# Patient Record
Sex: MALE | Race: WHITE | NOT HISPANIC OR LATINO | Employment: OTHER | URBAN - METROPOLITAN AREA
[De-identification: names, ages, dates, MRNs, and addresses within clinical notes are randomized per-mention and may not be internally consistent; named-entity substitution may affect disease eponyms.]

---

## 2019-12-18 ENCOUNTER — HOSPITAL ENCOUNTER (EMERGENCY)
Facility: MEDICAL CENTER | Age: 84
End: 2019-12-18
Attending: EMERGENCY MEDICINE
Payer: MEDICARE

## 2019-12-18 ENCOUNTER — APPOINTMENT (OUTPATIENT)
Dept: RADIOLOGY | Facility: MEDICAL CENTER | Age: 84
End: 2019-12-18
Attending: EMERGENCY MEDICINE
Payer: MEDICARE

## 2019-12-18 VITALS
RESPIRATION RATE: 16 BRPM | HEART RATE: 75 BPM | OXYGEN SATURATION: 97 % | WEIGHT: 149.91 LBS | BODY MASS INDEX: 23.53 KG/M2 | TEMPERATURE: 97.5 F | DIASTOLIC BLOOD PRESSURE: 69 MMHG | SYSTOLIC BLOOD PRESSURE: 117 MMHG | HEIGHT: 67 IN

## 2019-12-18 DIAGNOSIS — S61.412A LACERATION OF LEFT HAND WITHOUT FOREIGN BODY, INITIAL ENCOUNTER: ICD-10-CM

## 2019-12-18 PROCEDURE — 700101 HCHG RX REV CODE 250

## 2019-12-18 PROCEDURE — 304217 HCHG IRRIGATION SYSTEM

## 2019-12-18 PROCEDURE — 73130 X-RAY EXAM OF HAND: CPT | Mod: LT

## 2019-12-18 PROCEDURE — 303747 HCHG EXTRA SUTURE

## 2019-12-18 PROCEDURE — 304999 HCHG REPAIR-SIMPLE/INTERMED LEVEL 1

## 2019-12-18 PROCEDURE — 99283 EMERGENCY DEPT VISIT LOW MDM: CPT

## 2019-12-18 RX ORDER — LIDOCAINE HYDROCHLORIDE AND EPINEPHRINE BITARTRATE 20; .01 MG/ML; MG/ML
INJECTION, SOLUTION SUBCUTANEOUS
Status: COMPLETED
Start: 2019-12-18 | End: 2019-12-18

## 2019-12-18 RX ORDER — LIDOCAINE HYDROCHLORIDE AND EPINEPHRINE BITARTRATE 20; .01 MG/ML; MG/ML
10 INJECTION, SOLUTION SUBCUTANEOUS ONCE
Status: COMPLETED | OUTPATIENT
Start: 2019-12-18 | End: 2019-12-18

## 2019-12-18 RX ADMIN — LIDOCAINE HYDROCHLORIDE AND EPINEPHRINE BITARTRATE: 20; .01 INJECTION, SOLUTION SUBCUTANEOUS at 07:45

## 2019-12-18 RX ADMIN — LIDOCAINE HYDROCHLORIDE AND EPINEPHRINE: 20; 10 INJECTION, SOLUTION INFILTRATION; PERINEURAL at 07:45

## 2019-12-18 NOTE — ED TRIAGE NOTES
BIB EMS with   Chief Complaint   Patient presents with   • Hand Laceration   Fell out of bed and tore left hand on night stand. Bleeding controlled, dressing in place.  VSS. Denies LOC, head, neck, or back pain. A&O x 4.

## 2019-12-18 NOTE — ED PROVIDER NOTES
ED Provider Note    CHIEF COMPLAINT  Chief Complaint   Patient presents with   • Hand Laceration       HPI  Stephen Ortiz is a 84 y.o. male who presents with a left hand laceration after a fall.  The patient states that he fell out of bed this morning and cut the dorsal aspect of his left hand on some type of object.  He presents with a large laceration to the dorsal aspect of the hand with some arterial bleeding.  The patient states he does not have any loss of function to the hand but does have significant discomfort dorsally.  He is unaware of any other injuries.  He states he does not take anticoagulants.    REVIEW OF SYSTEMS  See HPI for further details. All other systems are negative.     PAST MEDICAL HISTORY  Past Medical History:   Diagnosis Date   • Cancer (HCC) 2004    skin and prostate   • CATARACT    • Hypertension    • Pain     back   • Psychiatric problem     depression       FAMILY HISTORY  [unfilled]    SOCIAL HISTORY  Social History     Socioeconomic History   • Marital status:      Spouse name: Not on file   • Number of children: Not on file   • Years of education: Not on file   • Highest education level: Not on file   Occupational History   • Not on file   Social Needs   • Financial resource strain: Not on file   • Food insecurity:     Worry: Not on file     Inability: Not on file   • Transportation needs:     Medical: Not on file     Non-medical: Not on file   Tobacco Use   • Smoking status: Former Smoker     Packs/day: 0.00   • Smokeless tobacco: Never Used   • Tobacco comment: 1ppd for 20+ yrs   Substance and Sexual Activity   • Alcohol use: Yes     Comment: 5 beers a day   • Drug use: No   • Sexual activity: Not on file   Lifestyle   • Physical activity:     Days per week: Not on file     Minutes per session: Not on file   • Stress: Not on file   Relationships   • Social connections:     Talks on phone: Not on file     Gets together: Not on file     Attends Congregation service: Not  "on file     Active member of club or organization: Not on file     Attends meetings of clubs or organizations: Not on file     Relationship status: Not on file   • Intimate partner violence:     Fear of current or ex partner: Not on file     Emotionally abused: Not on file     Physically abused: Not on file     Forced sexual activity: Not on file   Other Topics Concern   • Not on file   Social History Narrative   • Not on file       SURGICAL HISTORY  Past Surgical History:   Procedure Laterality Date   • RECOVERY  7/12/2010    Performed by SURGERY, CATH-RECOVERY at [facility]   • OTHER  2002    hernia   • OTHER ORTHOPEDIC SURGERY  1990?    right arm bicep       CURRENT MEDICATIONS  Home Medications     Reviewed by Bette Garcia R.N. (Registered Nurse) on 12/18/19 at 0727  Med List Status: Partial   Medication Last Dose Status   furosemide (LASIX) 20 MG TABS  Active   Hydrocodone-Acetaminophen (VICODIN PO)  Active   lisinopril (PRINIVIL) 20 MG TABS  Active   metoprolol (LOPRESSOR) 50 MG TABS  Active                ALLERGIES  No Known Allergies    PHYSICAL EXAM  VITAL SIGNS: /82   Pulse 80   Temp 36.4 °C (97.5 °F) (Temporal)   Resp 16   Ht 1.702 m (5' 7\")   Wt 68 kg (149 lb 14.6 oz)   SpO2 92%   BMI 23.48 kg/m²  Room air O2: 92    Constitutional: Chronically ill in appearance  HENT: Normocephalic, Atraumatic, Bilateral external ears normal, Oropharynx moist, No oral exudates, Nose normal.   Eyes: PERRLA, EOMI, Conjunctiva normal, No discharge.   Neck: Normal range of motion, No tenderness, Supple, No stridor.   Lymphatic: No lymphadenopathy noted.   Cardiovascular: Normal heart rate, Normal rhythm, No murmurs, No rubs, No gallops.   Thorax & Lungs: Symmetrically diminished throughout, No respiratory distress, No wheezing, No chest tenderness.   Abdomen: Bowel sounds normal, Soft, No tenderness, No masses, No pulsatile masses.   Skin: 4 cm stellate laceration to the dorsal aspect " of the left hand with a superficial arterial bleed.   Back: No tenderness, No CVA tenderness.   Extremities: The laceration described above otherwise intact distal pulses, No edema, No tenderness, No cyanosis, No clubbing.   Neurologic: Alert & oriented x 3, Normal motor function, Normal sensory function, No focal deficits noted.   Psychiatric: Affect normal, Judgment normal, Mood normal.     RADIOLOGY  DX-HAND 3+ LEFT   Final Result      1.  No acute fracture or dislocation. No radiopaque foreign body.   2.  Soft tissue calcification along the dorsomedial side of the fifth middle phalanx, which suggests or soft tissue mass lesion. Consider outpatient contrast-enhanced MRI for further evaluation.        PROCEDURES laceration repair  Lidocaine with epinephrine was utilized for local anesthetic.  I irrigated the wound myself.  The patient did have 3 spanning sutures placed to help control bleeding and approximate the wound.  The full length of the laceration cannot be completely repaired due to the fragile skin.    COURSE & MEDICAL DECISION MAKING  Pertinent Labs & Imaging studies reviewed. (See chart for details)  This an 84-year-old gentleman who presents with a laceration to the dorsal aspect of the left hand.  The patient did have some superficial arterial bleeding on arrival I cannot localize the vessel and therefore I placed 3 deep spanning sutures and did have decent hemostasis.  A pressure bandage was applied.  The patient is currently establishing care at the [facility].  I discussed the abnormality on the x-ray that will require further outpatient work-up.  [facility]  The patient will return to the emergency department for increased bleeding or signs of infection.  Otherwise I do not appreciate any other evidence of injury.    FINAL IMPRESSION  1.  4 cm stellate complex laceration to the left hand    Disposition  The patient will be  discharged in stable condition         Electronically signed by: Shawn Haney, 12/18/2019 7:48 AM

## 2019-12-20 ENCOUNTER — HOSPITAL ENCOUNTER (EMERGENCY)
Facility: MEDICAL CENTER | Age: 84
End: 2019-12-20
Attending: EMERGENCY MEDICINE
Payer: MEDICARE

## 2019-12-20 VITALS
RESPIRATION RATE: 12 BRPM | DIASTOLIC BLOOD PRESSURE: 82 MMHG | TEMPERATURE: 97 F | HEART RATE: 108 BPM | HEIGHT: 67 IN | BODY MASS INDEX: 23.53 KG/M2 | WEIGHT: 149.91 LBS | SYSTOLIC BLOOD PRESSURE: 133 MMHG | OXYGEN SATURATION: 95 %

## 2019-12-20 DIAGNOSIS — S61.412A SKIN TEAR OF LEFT HAND WITHOUT COMPLICATION, INITIAL ENCOUNTER: ICD-10-CM

## 2019-12-20 PROCEDURE — 302874 HCHG BANDAGE ACE 2 OR 3""

## 2019-12-20 PROCEDURE — 99283 EMERGENCY DEPT VISIT LOW MDM: CPT

## 2019-12-20 PROCEDURE — 303485 HCHG DRESSING MEDIUM

## 2019-12-20 PROCEDURE — 304217 HCHG IRRIGATION SYSTEM

## 2019-12-20 SDOH — HEALTH STABILITY: MENTAL HEALTH: HOW MANY STANDARD DRINKS CONTAINING ALCOHOL DO YOU HAVE ON A TYPICAL DAY?: 1 OR 2

## 2019-12-20 SDOH — HEALTH STABILITY: MENTAL HEALTH: HOW OFTEN DO YOU HAVE A DRINK CONTAINING ALCOHOL?: 2-3 TIMES A WEEK

## 2019-12-20 NOTE — ED PROVIDER NOTES
ED Provider Note    Scribed for Ryan Lopes M.D. by Leonor Nam. 12/20/2019  9:22 AM    Primary care provider: Pcp Not In Computer  Means of arrival: Walk-In  History obtained from: Patient  History limited by: None    CHIEF COMPLAINT  Chief Complaint   Patient presents with   • Laceration     hand lac repaired 2 days ago, bleeding again this am. squirting slightly when redressed.      HPI  Stephen Ortiz is a 84 y.o. male who presents to the Emergency Department for evaluation of a laceration to the dorsal aspect of his left hand, sustained after fall 2 days ago. Patient presented to the ED after his fall and had his laceration repaired however after rolling over on his left hand last night the wound reopened. There is active bleeding and patient reports placing Quikclot on it to try and control the bleeding.     REVIEW OF SYSTEMS  Pertinent positives include laceration, active bleeding.   Pertinent negatives include no nausea or vomiting.      PAST MEDICAL HISTORY   has a past medical history of Cancer (HCC) (2004), CATARACT, Hypertension, Pain, and Psychiatric problem.    SURGICAL HISTORY   has a past surgical history that includes other orthopedic surgery (1990?); other (2002); and recovery (7/12/2010).    SOCIAL HISTORY  Social History     Tobacco Use   • Smoking status: Former Smoker     Packs/day: 0.00   • Smokeless tobacco: Never Used   • Tobacco comment: 1ppd for 20+ yrs   Substance Use Topics   • Alcohol use: Yes     Frequency: 2-3 times a week     Drinks per session: 1 or 2     Comment: 5 beers a day   • Drug use: No      Social History     Substance and Sexual Activity   Drug Use No       FAMILY HISTORY  History reviewed. No pertinent family history.    CURRENT MEDICATIONS  Home Medications     Reviewed by Jerry Keith R.N. (Registered Nurse) on 12/20/19 at 0905  Med List Status: Not Addressed   Medication Last Dose Status   furosemide (LASIX) 20 MG TABS  Active  "  Hydrocodone-Acetaminophen (VICODIN PO)  Active   lisinopril (PRINIVIL) 20 MG TABS  Active   metoprolol (LOPRESSOR) 50 MG TABS  Active                ALLERGIES  No Known Allergies    PHYSICAL EXAM  VITAL SIGNS: /82   Pulse (!) 108   Temp 36.1 °C (97 °F) (Temporal)   Resp 12   Ht 1.702 m (5' 7\")   Wt 68 kg (149 lb 14.6 oz)   SpO2 95%   BMI 23.48 kg/m²     Nursing note and vitals reviewed.  Constitutional: Well-developed and well-nourished. No distress.   HENT: Head is normocephalic and atraumatic. Oropharynx is clear and moist without exudate or erythema.   Eyes: Pupils are equal, round, and reactive to light. Conjunctiva are normal.   Cardiovascular: Normal rate and regular rhythm. No murmur heard. Normal radial pulses.  Pulmonary/Chest: Breath sounds normal. No wheezes or rales.   Abdominal: Soft and non-tender. No distention    Musculoskeletal: Extremities exhibit normal range of motion without edema.  Neurological: Awake, alert and oriented to person, place, and time. No focal deficits noted.  Skin: Sutured wound to the dorsum of left hand. There is Quikclot covering the wound. Small hematoma. There is some venous oozing from the wound. Skin is warm and dry. No rash.   Psychiatric: Normal mood and affect. Appropriate for clinical situation.      COURSE & MEDICAL DECISION MAKING  Nursing notes, VS, PMSFHx reviewed in chart.     Review of past medical records shows the patient was seen here 2 days ago for laceration repair.      9:22 AM - Patient seen and examined at bedside. I do not feel that additional sutures would be beneficial as this will increase risk of infection. A pressure dressing will be placed wit an ace bandage on top. Discussed this plan of care with the patient and he verbalized his understanding and is agreeable. He is advised to keep the dressing on for 24 hours and he will be discharged home after interventions.      The patient will return for new or worsening symptoms and is " stable at the time of discharge.    The patient is referred to a primary physician for blood pressure management, diabetic screening, and for all other preventative health concerns.      DISPOSITION:  Patient will be discharged home in stable condition.    FOLLOW UP:  [facility], Emergency Dept  [address] [id number] [phone]    If symptoms worsen      OUTPATIENT MEDICATIONS:  New Prescriptions    No medications on file         FINAL IMPRESSION  1. Skin tear of left hand without complication, initial encounter          Leonor DAVIS (Ksenia), am scribing for, and in the presence of, Ryan Lopes M.D..    Electronically signed by: Leonor Nam (Ksenia), 12/20/2019    IRyan M.D. personally performed the services described in this documentation, as scribed by Leonor Nam in my presence, and it is both accurate and complete. E    The note accurately reflects work and decisions made by me.  Ryan Lopes  12/20/2019  2:11 PM

## 2019-12-20 NOTE — ED NOTES
Wound cleansed with NS. Gauze, then pressure dressing, then ace bandage applied. Wound care teaching w/ return verbalization from patient. Denies questions/concerns. Grandson present for all teaching.

## 2019-12-20 NOTE — ED TRIAGE NOTES
"Chief Complaint   Patient presents with   • Laceration     hand lac repaired 2 days ago, bleeding again this am. squirting slightly when redressed.      /82   Pulse (!) 108   Temp 36.1 °C (97 °F) (Temporal)   Resp 12   Ht 1.702 m (5' 7\")   Wt 68 kg (149 lb 14.6 oz)   SpO2 95%   BMI 23.48 kg/m²     84 yr old male with hand laceration repaired 2 days ago. Started bleeding this am.   "

## 2019-12-20 NOTE — ED NOTES
Pt discharged at this time. Given all prescriptions and instructions. Verbalize understanding of all dc instructions. Released to self via ambulatory.

## 2020-12-27 ENCOUNTER — APPOINTMENT (OUTPATIENT)
Dept: RADIOLOGY | Facility: MEDICAL CENTER | Age: 85
DRG: 189 | End: 2020-12-27
Attending: EMERGENCY MEDICINE
Payer: COMMERCIAL

## 2020-12-27 ENCOUNTER — APPOINTMENT (OUTPATIENT)
Dept: CARDIOLOGY | Facility: MEDICAL CENTER | Age: 85
DRG: 189 | End: 2020-12-27
Attending: STUDENT IN AN ORGANIZED HEALTH CARE EDUCATION/TRAINING PROGRAM
Payer: COMMERCIAL

## 2020-12-27 ENCOUNTER — APPOINTMENT (OUTPATIENT)
Dept: RADIOLOGY | Facility: MEDICAL CENTER | Age: 85
DRG: 189 | End: 2020-12-27
Attending: STUDENT IN AN ORGANIZED HEALTH CARE EDUCATION/TRAINING PROGRAM
Payer: COMMERCIAL

## 2020-12-27 ENCOUNTER — HOSPITAL ENCOUNTER (INPATIENT)
Facility: MEDICAL CENTER | Age: 85
LOS: 17 days | DRG: 189 | End: 2021-01-13
Attending: EMERGENCY MEDICINE | Admitting: STUDENT IN AN ORGANIZED HEALTH CARE EDUCATION/TRAINING PROGRAM
Payer: COMMERCIAL

## 2020-12-27 DIAGNOSIS — C93.10 CHRONIC MYELOMONOCYTIC LEUKEMIA NOT HAVING ACHIEVED REMISSION (HCC): ICD-10-CM

## 2020-12-27 DIAGNOSIS — I48.20 CHRONIC ATRIAL FIBRILLATION (HCC): ICD-10-CM

## 2020-12-27 DIAGNOSIS — J96.01 ACUTE RESPIRATORY FAILURE WITH HYPOXIA (HCC): ICD-10-CM

## 2020-12-27 DIAGNOSIS — I34.0 NONRHEUMATIC MITRAL VALVE REGURGITATION: ICD-10-CM

## 2020-12-27 DIAGNOSIS — L97.519 ULCER OF GREAT TOE, RIGHT, WITH UNSPECIFIED SEVERITY (HCC): ICD-10-CM

## 2020-12-27 DIAGNOSIS — I36.1 NONRHEUMATIC TRICUSPID VALVE REGURGITATION: ICD-10-CM

## 2020-12-27 DIAGNOSIS — I50.23 ACUTE ON CHRONIC SYSTOLIC CONGESTIVE HEART FAILURE (HCC): ICD-10-CM

## 2020-12-27 PROBLEM — N17.9 AKI (ACUTE KIDNEY INJURY) (HCC): Status: ACTIVE | Noted: 2020-12-27

## 2020-12-27 PROBLEM — I50.9 ACUTE ON CHRONIC CONGESTIVE HEART FAILURE (HCC): Status: ACTIVE | Noted: 2020-12-27

## 2020-12-27 PROBLEM — J18.9 CAP (COMMUNITY ACQUIRED PNEUMONIA): Status: ACTIVE | Noted: 2020-12-27

## 2020-12-27 PROBLEM — R74.8 ELEVATED LIVER ENZYMES: Status: ACTIVE | Noted: 2020-12-27

## 2020-12-27 PROBLEM — I07.1 TRICUSPID REGURGITATION: Status: ACTIVE | Noted: 2020-12-27

## 2020-12-27 LAB
ALBUMIN SERPL BCP-MCNC: 4.5 G/DL (ref 3.2–4.9)
ALBUMIN/GLOB SERPL: 1.5 G/DL
ALP SERPL-CCNC: 115 U/L (ref 30–99)
ALT SERPL-CCNC: 218 U/L (ref 2–50)
ANION GAP SERPL CALC-SCNC: 20 MMOL/L (ref 7–16)
ANISOCYTOSIS BLD QL SMEAR: ABNORMAL
APPEARANCE UR: ABNORMAL
AST SERPL-CCNC: 281 U/L (ref 12–45)
BACTERIA #/AREA URNS HPF: NEGATIVE /HPF
BASOPHILS # BLD AUTO: 0 % (ref 0–1.8)
BASOPHILS # BLD: 0 K/UL (ref 0–0.12)
BILIRUB SERPL-MCNC: 3.6 MG/DL (ref 0.1–1.5)
BILIRUB UR QL STRIP.AUTO: ABNORMAL
BUN SERPL-MCNC: 49 MG/DL (ref 8–22)
BURR CELLS BLD QL SMEAR: NORMAL
CALCIUM SERPL-MCNC: 9.6 MG/DL (ref 8.5–10.5)
CHLORIDE SERPL-SCNC: 102 MMOL/L (ref 96–112)
CO2 SERPL-SCNC: 14 MMOL/L (ref 20–33)
COLOR UR: ABNORMAL
COVID ORDER STATUS COVID19: NORMAL
CREAT SERPL-MCNC: 2.85 MG/DL (ref 0.5–1.4)
CRP SERPL HS-MCNC: 2.28 MG/DL (ref 0–0.75)
D DIMER PPP IA.FEU-MCNC: 3.74 UG/ML (FEU) (ref 0–0.5)
EKG IMPRESSION: NORMAL
EOSINOPHIL # BLD AUTO: 0 K/UL (ref 0–0.51)
EOSINOPHIL NFR BLD: 0 % (ref 0–6.9)
EPI CELLS #/AREA URNS HPF: ABNORMAL /HPF
ERYTHROCYTE [DISTWIDTH] IN BLOOD BY AUTOMATED COUNT: 76.9 FL (ref 35.9–50)
FLUAV RNA SPEC QL NAA+PROBE: NEGATIVE
FLUBV RNA SPEC QL NAA+PROBE: NEGATIVE
GLOBULIN SER CALC-MCNC: 3 G/DL (ref 1.9–3.5)
GLUCOSE SERPL-MCNC: 89 MG/DL (ref 65–99)
GLUCOSE UR STRIP.AUTO-MCNC: NEGATIVE MG/DL
HCT VFR BLD AUTO: 36.9 % (ref 42–52)
HGB BLD-MCNC: 11.6 G/DL (ref 14–18)
HYALINE CASTS #/AREA URNS LPF: ABNORMAL /LPF
KETONES UR STRIP.AUTO-MCNC: ABNORMAL MG/DL
LACTATE BLD-SCNC: 4.3 MMOL/L (ref 0.5–2)
LEUKOCYTE ESTERASE UR QL STRIP.AUTO: NEGATIVE
LYMPHOCYTES # BLD AUTO: 0.71 K/UL (ref 1–4.8)
LYMPHOCYTES NFR BLD: 1.7 % (ref 22–41)
MACROCYTES BLD QL SMEAR: ABNORMAL
MAGNESIUM SERPL-MCNC: 2.3 MG/DL (ref 1.5–2.5)
MANUAL DIFF BLD: NORMAL
MCH RBC QN AUTO: 32 PG (ref 27–33)
MCHC RBC AUTO-ENTMCNC: 31.4 G/DL (ref 33.7–35.3)
MCV RBC AUTO: 101.7 FL (ref 81.4–97.8)
MICRO URNS: ABNORMAL
MONOCYTES # BLD AUTO: 14.49 K/UL (ref 0–0.85)
MONOCYTES NFR BLD AUTO: 34.5 % (ref 0–13.4)
MORPHOLOGY BLD-IMP: NORMAL
NEUTROPHILS # BLD AUTO: 26.8 K/UL (ref 1.82–7.42)
NEUTROPHILS NFR BLD: 62.9 % (ref 44–72)
NEUTS BAND NFR BLD MANUAL: 0.9 % (ref 0–10)
NITRITE UR QL STRIP.AUTO: NEGATIVE
NRBC # BLD AUTO: 0.29 K/UL
NRBC BLD-RTO: 0.7 /100 WBC
NT-PROBNP SERPL IA-MCNC: ABNORMAL PG/ML (ref 0–125)
OVALOCYTES BLD QL SMEAR: NORMAL
PH UR STRIP.AUTO: 5 [PH] (ref 5–8)
PHOSPHATE SERPL-MCNC: 3.6 MG/DL (ref 2.5–4.5)
PLATELET # BLD AUTO: 75 K/UL (ref 164–446)
PLATELET BLD QL SMEAR: NORMAL
PMV BLD AUTO: 11.6 FL (ref 9–12.9)
POIKILOCYTOSIS BLD QL SMEAR: NORMAL
POLYCHROMASIA BLD QL SMEAR: NORMAL
POTASSIUM SERPL-SCNC: 5.4 MMOL/L (ref 3.6–5.5)
PROCALCITONIN SERPL-MCNC: 0.58 NG/ML
PROT SERPL-MCNC: 7.5 G/DL (ref 6–8.2)
PROT UR QL STRIP: 100 MG/DL
RBC # BLD AUTO: 3.63 M/UL (ref 4.7–6.1)
RBC # URNS HPF: ABNORMAL /HPF
RBC BLD AUTO: PRESENT
RBC UR QL AUTO: NEGATIVE
RSV RNA SPEC QL NAA+PROBE: NEGATIVE
SARS-COV-2 RNA RESP QL NAA+PROBE: NOTDETECTED
SODIUM SERPL-SCNC: 136 MMOL/L (ref 135–145)
SP GR UR STRIP.AUTO: 1.02
SPECIMEN SOURCE: NORMAL
TROPONIN T SERPL-MCNC: 35 NG/L (ref 6–19)
UROBILINOGEN UR STRIP.AUTO-MCNC: 1 MG/DL
WBC # BLD AUTO: 42 K/UL (ref 4.8–10.8)
WBC #/AREA URNS HPF: ABNORMAL /HPF

## 2020-12-27 PROCEDURE — 84484 ASSAY OF TROPONIN QUANT: CPT

## 2020-12-27 PROCEDURE — 83520 IMMUNOASSAY QUANT NOS NONAB: CPT

## 2020-12-27 PROCEDURE — A9540 TC99M MAA: HCPCS

## 2020-12-27 PROCEDURE — 85027 COMPLETE CBC AUTOMATED: CPT

## 2020-12-27 PROCEDURE — 86140 C-REACTIVE PROTEIN: CPT

## 2020-12-27 PROCEDURE — 94640 AIRWAY INHALATION TREATMENT: CPT

## 2020-12-27 PROCEDURE — 87186 SC STD MICRODIL/AGAR DIL: CPT

## 2020-12-27 PROCEDURE — 99285 EMERGENCY DEPT VISIT HI MDM: CPT

## 2020-12-27 PROCEDURE — 80053 COMPREHEN METABOLIC PANEL: CPT

## 2020-12-27 PROCEDURE — 87077 CULTURE AEROBIC IDENTIFY: CPT | Mod: 91

## 2020-12-27 PROCEDURE — A9270 NON-COVERED ITEM OR SERVICE: HCPCS | Performed by: STUDENT IN AN ORGANIZED HEALTH CARE EDUCATION/TRAINING PROGRAM

## 2020-12-27 PROCEDURE — 93005 ELECTROCARDIOGRAM TRACING: CPT

## 2020-12-27 PROCEDURE — 99221 1ST HOSP IP/OBS SF/LOW 40: CPT | Performed by: STUDENT IN AN ORGANIZED HEALTH CARE EDUCATION/TRAINING PROGRAM

## 2020-12-27 PROCEDURE — 87086 URINE CULTURE/COLONY COUNT: CPT

## 2020-12-27 PROCEDURE — 85007 BL SMEAR W/DIFF WBC COUNT: CPT

## 2020-12-27 PROCEDURE — 700111 HCHG RX REV CODE 636 W/ 250 OVERRIDE (IP): Performed by: STUDENT IN AN ORGANIZED HEALTH CARE EDUCATION/TRAINING PROGRAM

## 2020-12-27 PROCEDURE — 770020 HCHG ROOM/CARE - TELE (206)

## 2020-12-27 PROCEDURE — 700111 HCHG RX REV CODE 636 W/ 250 OVERRIDE (IP): Performed by: EMERGENCY MEDICINE

## 2020-12-27 PROCEDURE — 83605 ASSAY OF LACTIC ACID: CPT

## 2020-12-27 PROCEDURE — 93970 EXTREMITY STUDY: CPT

## 2020-12-27 PROCEDURE — 76705 ECHO EXAM OF ABDOMEN: CPT

## 2020-12-27 PROCEDURE — 93005 ELECTROCARDIOGRAM TRACING: CPT | Performed by: EMERGENCY MEDICINE

## 2020-12-27 PROCEDURE — 36415 COLL VENOUS BLD VENIPUNCTURE: CPT

## 2020-12-27 PROCEDURE — 87040 BLOOD CULTURE FOR BACTERIA: CPT

## 2020-12-27 PROCEDURE — 80500 HCHG CLINICAL PATH CONSULT-LIMITED: CPT

## 2020-12-27 PROCEDURE — 84100 ASSAY OF PHOSPHORUS: CPT

## 2020-12-27 PROCEDURE — 71045 X-RAY EXAM CHEST 1 VIEW: CPT

## 2020-12-27 PROCEDURE — 83880 ASSAY OF NATRIURETIC PEPTIDE: CPT

## 2020-12-27 PROCEDURE — 700105 HCHG RX REV CODE 258: Performed by: EMERGENCY MEDICINE

## 2020-12-27 PROCEDURE — 0241U HCHG SARS-COV-2 COVID-19 NFCT DS RESP RNA 4 TRGT MIC: CPT

## 2020-12-27 PROCEDURE — 84145 PROCALCITONIN (PCT): CPT

## 2020-12-27 PROCEDURE — 96375 TX/PRO/DX INJ NEW DRUG ADDON: CPT

## 2020-12-27 PROCEDURE — 93306 TTE W/DOPPLER COMPLETE: CPT

## 2020-12-27 PROCEDURE — 83735 ASSAY OF MAGNESIUM: CPT

## 2020-12-27 PROCEDURE — 85379 FIBRIN DEGRADATION QUANT: CPT

## 2020-12-27 PROCEDURE — C9803 HOPD COVID-19 SPEC COLLECT: HCPCS | Performed by: EMERGENCY MEDICINE

## 2020-12-27 PROCEDURE — 700102 HCHG RX REV CODE 250 W/ 637 OVERRIDE(OP): Performed by: STUDENT IN AN ORGANIZED HEALTH CARE EDUCATION/TRAINING PROGRAM

## 2020-12-27 PROCEDURE — 81001 URINALYSIS AUTO W/SCOPE: CPT

## 2020-12-27 PROCEDURE — 96365 THER/PROPH/DIAG IV INF INIT: CPT

## 2020-12-27 RX ORDER — ALLOPURINOL 300 MG/1
150 TABLET ORAL EVERY MORNING
COMMUNITY

## 2020-12-27 RX ORDER — HYDRALAZINE HYDROCHLORIDE 25 MG/1
25 TABLET, FILM COATED ORAL
Status: ON HOLD | COMMUNITY
Start: 2020-10-13 | End: 2021-01-12

## 2020-12-27 RX ORDER — ACETAMINOPHEN 325 MG/1
650 TABLET ORAL EVERY 6 HOURS PRN
Status: DISCONTINUED | OUTPATIENT
Start: 2020-12-27 | End: 2020-12-28

## 2020-12-27 RX ORDER — BISACODYL 10 MG
10 SUPPOSITORY, RECTAL RECTAL
Status: DISCONTINUED | OUTPATIENT
Start: 2020-12-27 | End: 2020-12-30

## 2020-12-27 RX ORDER — HEPARIN SODIUM 5000 [USP'U]/ML
5000 INJECTION, SOLUTION INTRAVENOUS; SUBCUTANEOUS EVERY 8 HOURS
Status: DISCONTINUED | OUTPATIENT
Start: 2020-12-27 | End: 2020-12-29

## 2020-12-27 RX ORDER — ALLOPURINOL 300 MG/1
150 TABLET ORAL EVERY MORNING
Status: DISCONTINUED | OUTPATIENT
Start: 2020-12-27 | End: 2021-01-13 | Stop reason: HOSPADM

## 2020-12-27 RX ORDER — POTASSIUM CHLORIDE 20 MEQ/1
20 TABLET, EXTENDED RELEASE ORAL DAILY
Status: ON HOLD | COMMUNITY
End: 2021-01-12

## 2020-12-27 RX ORDER — CARVEDILOL 3.12 MG/1
3.12 TABLET ORAL 2 TIMES DAILY
Status: DISCONTINUED | OUTPATIENT
Start: 2020-12-27 | End: 2021-01-13 | Stop reason: HOSPADM

## 2020-12-27 RX ORDER — TAMSULOSIN HYDROCHLORIDE 0.4 MG/1
0.4 CAPSULE ORAL EVERY MORNING
Status: DISCONTINUED | OUTPATIENT
Start: 2020-12-27 | End: 2021-01-13 | Stop reason: HOSPADM

## 2020-12-27 RX ORDER — LOSARTAN POTASSIUM 25 MG/1
25 TABLET ORAL EVERY EVENING
Status: ON HOLD | COMMUNITY
Start: 2020-11-30 | End: 2021-01-12

## 2020-12-27 RX ORDER — LOSARTAN POTASSIUM 25 MG/1
25 TABLET ORAL EVERY EVENING
Status: DISCONTINUED | OUTPATIENT
Start: 2020-12-27 | End: 2020-12-27

## 2020-12-27 RX ORDER — ONDANSETRON 4 MG/1
4 TABLET, ORALLY DISINTEGRATING ORAL EVERY 4 HOURS PRN
Status: DISCONTINUED | OUTPATIENT
Start: 2020-12-27 | End: 2021-01-13 | Stop reason: HOSPADM

## 2020-12-27 RX ORDER — ISOSORBIDE MONONITRATE 30 MG/1
30 TABLET, EXTENDED RELEASE ORAL
Status: ON HOLD | COMMUNITY
Start: 2020-11-30 | End: 2021-01-12

## 2020-12-27 RX ORDER — CARVEDILOL 3.12 MG/1
3.12 TABLET ORAL
COMMUNITY
Start: 2020-10-13

## 2020-12-27 RX ORDER — ONDANSETRON 2 MG/ML
4 INJECTION INTRAMUSCULAR; INTRAVENOUS EVERY 4 HOURS PRN
Status: DISCONTINUED | OUTPATIENT
Start: 2020-12-27 | End: 2021-01-13 | Stop reason: HOSPADM

## 2020-12-27 RX ORDER — AMOXICILLIN 250 MG
2 CAPSULE ORAL 2 TIMES DAILY
Status: DISCONTINUED | OUTPATIENT
Start: 2020-12-27 | End: 2020-12-30

## 2020-12-27 RX ORDER — OXYCODONE HYDROCHLORIDE 15 MG/1
15 TABLET ORAL
Status: ON HOLD | COMMUNITY
End: 2021-01-12

## 2020-12-27 RX ORDER — BISACODYL 10 MG
10 SUPPOSITORY, RECTAL RECTAL
Status: ON HOLD | COMMUNITY
End: 2021-01-12

## 2020-12-27 RX ORDER — DOCUSATE SODIUM 250 MG
250 CAPSULE ORAL 2 TIMES DAILY
Status: ON HOLD | COMMUNITY
End: 2021-01-12

## 2020-12-27 RX ORDER — ROSUVASTATIN CALCIUM 20 MG/1
10 TABLET, COATED ORAL
Status: DISCONTINUED | OUTPATIENT
Start: 2020-12-27 | End: 2020-12-27

## 2020-12-27 RX ORDER — LABETALOL HYDROCHLORIDE 5 MG/ML
10 INJECTION, SOLUTION INTRAVENOUS EVERY 4 HOURS PRN
Status: DISCONTINUED | OUTPATIENT
Start: 2020-12-27 | End: 2021-01-13 | Stop reason: HOSPADM

## 2020-12-27 RX ORDER — ROSUVASTATIN CALCIUM 10 MG/1
10 TABLET, COATED ORAL
COMMUNITY
Start: 2020-10-13

## 2020-12-27 RX ORDER — SODIUM CHLORIDE, SODIUM LACTATE, POTASSIUM CHLORIDE, AND CALCIUM CHLORIDE .6; .31; .03; .02 G/100ML; G/100ML; G/100ML; G/100ML
30 INJECTION, SOLUTION INTRAVENOUS
Status: COMPLETED | OUTPATIENT
Start: 2020-12-27 | End: 2020-12-27

## 2020-12-27 RX ORDER — HYDRALAZINE HYDROCHLORIDE 25 MG/1
25 TABLET, FILM COATED ORAL 2 TIMES DAILY
Status: DISCONTINUED | OUTPATIENT
Start: 2020-12-27 | End: 2020-12-30

## 2020-12-27 RX ORDER — LANOLIN ALCOHOL/MO/W.PET/CERES
325 CREAM (GRAM) TOPICAL DAILY
COMMUNITY
Start: 2020-12-01 | End: 2020-12-27

## 2020-12-27 RX ORDER — POLYETHYLENE GLYCOL 3350 17 G/17G
1 POWDER, FOR SOLUTION ORAL
Status: DISCONTINUED | OUTPATIENT
Start: 2020-12-27 | End: 2020-12-30

## 2020-12-27 RX ORDER — TAMSULOSIN HYDROCHLORIDE 0.4 MG/1
0.4 CAPSULE ORAL EVERY MORNING
COMMUNITY

## 2020-12-27 RX ORDER — ISOSORBIDE MONONITRATE 30 MG/1
30 TABLET, EXTENDED RELEASE ORAL 2 TIMES DAILY
Status: DISCONTINUED | OUTPATIENT
Start: 2020-12-27 | End: 2020-12-30

## 2020-12-27 RX ORDER — SODIUM CHLORIDE 9 MG/ML
INJECTION, SOLUTION INTRAVENOUS CONTINUOUS
Status: DISCONTINUED | OUTPATIENT
Start: 2020-12-27 | End: 2020-12-27

## 2020-12-27 RX ORDER — BUMETANIDE 2 MG/1
2 TABLET ORAL
COMMUNITY
Start: 2020-11-18 | End: 2020-12-27

## 2020-12-27 RX ORDER — SPIRONOLACTONE 25 MG/1
12.5 TABLET ORAL
COMMUNITY
Start: 2020-12-01 | End: 2020-12-27

## 2020-12-27 RX ORDER — AZITHROMYCIN 500 MG/5ML
500 INJECTION, POWDER, LYOPHILIZED, FOR SOLUTION INTRAVENOUS EVERY 24 HOURS
Status: DISCONTINUED | OUTPATIENT
Start: 2020-12-27 | End: 2020-12-27

## 2020-12-27 RX ORDER — DOXYCYCLINE 100 MG/1
100 TABLET ORAL EVERY 12 HOURS
Status: DISCONTINUED | OUTPATIENT
Start: 2020-12-27 | End: 2020-12-28

## 2020-12-27 RX ADMIN — ALLOPURINOL 150 MG: 300 TABLET ORAL at 16:08

## 2020-12-27 RX ADMIN — SODIUM CHLORIDE, POTASSIUM CHLORIDE, SODIUM LACTATE AND CALCIUM CHLORIDE 2244 ML: 600; 310; 30; 20 INJECTION, SOLUTION INTRAVENOUS at 10:00

## 2020-12-27 RX ADMIN — HEPARIN SODIUM 5000 UNITS: 5000 INJECTION, SOLUTION INTRAVENOUS; SUBCUTANEOUS at 22:40

## 2020-12-27 RX ADMIN — HEPARIN SODIUM 5000 UNITS: 5000 INJECTION, SOLUTION INTRAVENOUS; SUBCUTANEOUS at 16:09

## 2020-12-27 RX ADMIN — TAMSULOSIN HYDROCHLORIDE 0.4 MG: 0.4 CAPSULE ORAL at 16:08

## 2020-12-27 RX ADMIN — DOXYCYCLINE 100 MG: 100 TABLET, FILM COATED ORAL at 16:08

## 2020-12-27 RX ADMIN — CEFTRIAXONE SODIUM 1 G: 1 INJECTION, POWDER, FOR SOLUTION INTRAMUSCULAR; INTRAVENOUS at 11:39

## 2020-12-27 SDOH — HEALTH STABILITY: MENTAL HEALTH: HOW OFTEN DO YOU HAVE A DRINK CONTAINING ALCOHOL?: 2-4 TIMES A MONTH

## 2020-12-27 ASSESSMENT — ENCOUNTER SYMPTOMS
PALPITATIONS: 0
FEVER: 0
COUGH: 0
WEIGHT LOSS: 1
INSOMNIA: 0
DIZZINESS: 0
WHEEZING: 0
FALLS: 0
SHORTNESS OF BREATH: 1
HEMOPTYSIS: 0
EYE PAIN: 0
SENSORY CHANGE: 0
DIAPHORESIS: 0
CHILLS: 0
CLAUDICATION: 0
BLURRED VISION: 0
ABDOMINAL PAIN: 0
BACK PAIN: 0
NAUSEA: 0
SPUTUM PRODUCTION: 0
VOMITING: 0
HEADACHES: 0
FOCAL WEAKNESS: 0

## 2020-12-27 ASSESSMENT — FIBROSIS 4 INDEX: FIB4 SCORE: 21.06

## 2020-12-27 ASSESSMENT — LIFESTYLE VARIABLES: SUBSTANCE_ABUSE: 0

## 2020-12-27 ASSESSMENT — COPD QUESTIONNAIRES
COPD SCREENING SCORE: 4
HAVE YOU SMOKED AT LEAST 100 CIGARETTES IN YOUR ENTIRE LIFE: YES
DURING THE PAST 4 WEEKS HOW MUCH DID YOU FEEL SHORT OF BREATH: NONE/LITTLE OF THE TIME
DO YOU EVER COUGH UP ANY MUCUS OR PHLEGM?: NO/ONLY WITH OCCASIONAL COLDS OR INFECTIONS

## 2020-12-27 ASSESSMENT — PAIN DESCRIPTION - PAIN TYPE: TYPE: ACUTE PAIN

## 2020-12-27 NOTE — ASSESSMENT & PLAN NOTE
Improving  Avoid nephrotoxins as much as possible, renally dose medications, monitor inputs and outputs

## 2020-12-27 NOTE — ED TRIAGE NOTES
"Chief Complaint   Patient presents with   • Shortness of Breath     RA sat 86% upon arrival, no home O2   • Weakness     X3 days     Pt BIB [facility] with above complaint.  Pt very lethargic and minimally responsive.  3+ edema.  Pt recently moved from [place] to be with family.      Temp 37.9 °C (100.2 °F) (Oral)   Resp (!) 26   Ht 1.702 m (5' 7\")   Wt 74.8 kg (165 lb)   BMI 25.84 kg/m²     "

## 2020-12-27 NOTE — ASSESSMENT & PLAN NOTE
History of, s/p mitral valve clip in 10/2020  Echocardiogram 12/27 showed mild mitral stenosis   Monitor and optimize volume status, [facility]

## 2020-12-27 NOTE — ED PROVIDER NOTES
ED Provider Note    CHIEF COMPLAINT  Chief Complaint   Patient presents with   • Shortness of Breath     RA sat 86% upon arrival, no home O2   • Weakness     X3 days       HPI  Stephen Ortiz is a 85 y.o. male who presents to the emergency department with complaint of shortness of breath.  Per the patient the patient's son, he has had increasing shortness of breath for approximately 3 days.  He recently moved from [facility] here approximately 6 days ago via plane.  The patient has had increasing shortness of breath, increased work of breathing, weakness x3 days.  Per his son, he does have a significant history in the past of CHF and has had swelling of his lower extremities increasing in severity over several weeks.  In addition the patient has had chronic renal failure is on dialysis currently.  The patient does take oxycodone for his chronic pain and has not had more than his normal dose per his son.  Denies fever, has had a nonproductive cough, complains of swelling of his upper lower extremities, and chronic redness to bilateral extremities.  He is unsure of Covid exposure.  EMS, arrival at their home and the patient was satting 80-86 percentile room air and was placed on supplemental oxygen.  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  REVIEW OF SYSTEMS  Positives as above. Pertinent negatives include fever, nausea, vomiting, chest pain  All other 10 review of systems are negative    PAST MEDICAL HISTORY  Past Medical History:   Diagnosis Date   • Cancer ([facility]) 2004    skin and prostate   • CATARACT    • CHF (congestive heart failure) ([facility]) 2010   • Hypertension    • Pain     back   • Psychiatric problem     depression   • Renal failure     10% functioning       FAMILY HISTORY  Noncontributory    SOCIAL HISTORY  Social History     Socioeconomic History   • Marital status:      Spouse name: Not on file   • Number of  children: Not on file   • Years of education: Not on file   • Highest education level: Not on file   Occupational History   • Not on file   Social Needs   • Financial resource strain: Not on file   • Food insecurity     Worry: Not on file     Inability: Not on file   • Transportation needs     Medical: Not on file     Non-medical: Not on file   Tobacco Use   • Smoking status: Former Smoker     Packs/day: 0.00   • Smokeless tobacco: Never Used   • Tobacco comment: 1ppd for 20+ yrs   Substance and Sexual Activity   • Alcohol use: Not Currently     Frequency: 2-4 times a month     Drinks per session: 1 or 2     Comment: 5 beers a day   • Drug use: Never   • Sexual activity: Not on file   Lifestyle   • Physical activity     Days per week: Not on file     Minutes per session: Not on file   • Stress: Not on file   Relationships   • Social connections     Talks on phone: Not on file     Gets together: Not on file     Attends [employer] service: Not on file     Active member of club or organization: Not on file     Attends meetings of clubs or organizations: Not on file     Relationship status: Not on file   • Intimate partner violence     Fear of current or ex partner: Not on file     Emotionally abused: Not on file     Physically abused: Not on file     Forced sexual activity: Not on file   Other Topics Concern   • Not on file   Social History Narrative   • Not on file       SURGICAL HISTORY  Past Surgical History:   Procedure Laterality Date   • RECOVERY  7/12/2010    Performed by SURGERY, CATH-RECOVERY at [facility]   • OTHER  2002    hernia   • OTHER ORTHOPEDIC SURGERY  1990?    right arm bicep       CURRENT MEDICATIONS  Home Medications     Reviewed by Rosalie Herron (Pharmacy Tech) on 12/27/20 at 1157  Med List Status: Complete   Medication Last Dose Status   allopurinol (ZYLOPRIM) 300 MG Tab 12/26/2020 Active   bisacodyl (DULCOLAX) 10 MG Suppos > 1 WEEK Active   carvedilol (COREG) 3.125 MG  "Tab 12/26/2020 Active   docusate sodium (COLACE) 250 MG capsule 12/26/2020 Active   furosemide (LASIX) 20 MG TABS 12/26/2020 Active   hydrALAZINE (APRESOLINE) 25 MG Tab 12/26/2020 Active   isosorbide mononitrate SR (IMDUR) 30 MG TABLET SR 24 HR 12/26/2020 Active   losartan (COZAAR) 25 MG Tab 12/26/2020 Active   metoprolol (LOPRESSOR) 25 MG Tab 12/26/2020 Active   oxycodone (OXY-IR) 15 MG immediate release tablet 12/26/2020 Active   potassium chloride SA (KDUR) 20 MEQ Tab CR 12/26/2020 Active   rosuvastatin (CRESTOR) 10 MG Tab 12/26/2020 Active   tamsulosin (FLOMAX) 0.4 MG capsule 12/26/2020 Active                ALLERGIES  No Known Allergies    PHYSICAL EXAM  VITAL SIGNS: BP (!) 96/68   Pulse 78   Temp 37.9 °C (100.2 °F) (Oral)   Resp 16   Ht 1.702 m (5' 7\")   Wt 74.8 kg (165 lb)   SpO2 90%   BMI 25.84 kg/m²      Constitutional: Cachectic male in moderate distress non-toxic appearance.   Eyes: Pinpoint pupils, EOMI, Conjunctiva normal, No discharge.   Cardiovascular: Irregular regular rhythm, Normal rhythm, No murmurs, No rubs, No gallops, and intact distal pulses.   Thorax & Lungs: Slight respiratory distress, slight use of accessory muscles for inspiration expiration, slight guppy breathing, oxygen mask in place   abdomen: Bowel sounds normal, Soft, No tenderness, No guarding, No rebound, No pulsatile masses.   Skin: Warm, Dry, circumferential erythema from mid calf down bilaterally  Extremities: Full range of motion, no deformity, 3+ pitting edema bilateral lower extremities  Neurologic: Alert & oriented x 3, No focal deficits noted, acting appropriately on exam, somulent head.  Psychiatric: Affect normal for clinical presentation.    Results for orders placed or performed during the hospital encounter of 12/27/20   CBC WITH DIFFERENTIAL   Result Value Ref Range    WBC 42.0 (HH) 4.8 - 10.8 K/uL    RBC 3.63 (L) 4.70 - 6.10 M/uL    Hemoglobin 11.6 (L) 14.0 - 18.0 g/dL    Hematocrit 36.9 (L) 42.0 - 52.0 %    " .7 (H) 81.4 - 97.8 fL    MCH 32.0 27.0 - 33.0 pg    MCHC 31.4 (L) 33.7 - 35.3 g/dL    RDW 76.9 (H) 35.9 - 50.0 fL    Platelet Count 75 (L) 164 - 446 K/uL    MPV 11.6 9.0 - 12.9 fL    Neutrophils-Polys 62.90 44.00 - 72.00 %    Lymphocytes 1.70 (L) 22.00 - 41.00 %    Monocytes 34.50 (H) 0.00 - 13.40 %    Eosinophils 0.00 0.00 - 6.90 %    Basophils 0.00 0.00 - 1.80 %    Nucleated RBC 0.70 /100 WBC    Neutrophils (Absolute) 26.80 (H) 1.82 - 7.42 K/uL    Lymphs (Absolute) 0.71 (L) 1.00 - 4.80 K/uL    Monos (Absolute) 14.49 (H) 0.00 - 0.85 K/uL    Eos (Absolute) 0.00 0.00 - 0.51 K/uL    Baso (Absolute) 0.00 0.00 - 0.12 K/uL    NRBC (Absolute) 0.29 K/uL    Anisocytosis 1+     Macrocytosis 1+    COMP METABOLIC PANEL   Result Value Ref Range    Sodium 136 135 - 145 mmol/L    Potassium 5.4 3.6 - 5.5 mmol/L    Chloride 102 96 - 112 mmol/L    Co2 14 (L) 20 - 33 mmol/L    Anion Gap 20.0 (H) 7.0 - 16.0    Glucose 89 65 - 99 mg/dL    Bun 49 (H) 8 - 22 mg/dL    Creatinine 2.85 (H) 0.50 - 1.40 mg/dL    Calcium 9.6 8.5 - 10.5 mg/dL    AST(SGOT) 281 (H) 12 - 45 U/L    ALT(SGPT) 218 (H) 2 - 50 U/L    Alkaline Phosphatase 115 (H) 30 - 99 U/L    Total Bilirubin 3.6 (H) 0.1 - 1.5 mg/dL    Albumin 4.5 3.2 - 4.9 g/dL    Total Protein 7.5 6.0 - 8.2 g/dL    Globulin 3.0 1.9 - 3.5 g/dL    A-G Ratio 1.5 g/dL   URINALYSIS    Specimen: Urine, Cath   Result Value Ref Range    Color DK Yellow     Character Cloudy (A)     Specific Gravity 1.021 <1.035    Ph 5.0 5.0 - 8.0    Glucose Negative Negative mg/dL    Ketones Trace (A) Negative mg/dL    Protein 100 (A) Negative mg/dL    Bilirubin Small (A) Negative    Urobilinogen, Urine 1.0 Negative    Nitrite Negative Negative    Leukocyte Esterase Negative Negative    Occult Blood Negative Negative    Micro Urine Req Microscopic    COVID/SARS CoV-2 PCR    Specimen: Nasopharyngeal; Respirate   Result Value Ref Range    COVID Order Status Received    TROPONIN   Result Value Ref Range    Troponin T 35  (H) 6 - 19 ng/L   D-Dimer   Result Value Ref Range    D-Dimer Screen 3.74 (H) 0.00 - 0.50 ug/mL (FEU)   CRP Quantitative (Non-Cardiac)   Result Value Ref Range    Stat C-Reactive Protein 2.28 (H) 0.00 - 0.75 mg/dL   Procalcitonin   Result Value Ref Range    Procalcitonin 0.58 (H) <0.25 ng/mL   Magnesium   Result Value Ref Range    Magnesium 2.3 1.5 - 2.5 mg/dL   Phosphorus   Result Value Ref Range    Phosphorus 3.6 2.5 - 4.5 mg/dL   proBrain Natriuretic Peptide, NT   Result Value Ref Range    NT-proBNP 43578 (H) 0 - 125 pg/mL   LACTIC ACID   Result Value Ref Range    Lactic Acid 4.3 (HH) 0.5 - 2.0 mmol/L   URINE MICROSCOPIC (W/UA)   Result Value Ref Range    WBC 0-2 (A) /hpf    RBC 0-2 (A) /hpf    Bacteria Negative None /hpf    Epithelial Cells Few /hpf    Hyaline Cast 11-20 (A) /lpf   CoV-2, Flu A/B, And RSV by PCR   Result Value Ref Range    Influenza virus A RNA Negative Negative    Influenza virus B, PCR Negative Negative    RSV, PCR Negative Negative    SARS-CoV-2 by PCR NotDetected     SARS-CoV-2 Source NP Swab    ESTIMATED GFR   Result Value Ref Range    GFR If  26 (A) >60 mL/min/1.73 m 2    GFR If Non African American 21 (A) >60 mL/min/1.73 m 2   DIFFERENTIAL MANUAL   Result Value Ref Range    Bands-Stabs 0.90 0.00 - 10.00 %    Manual Diff Status PERFORMED    PERIPHERAL SMEAR REVIEW   Result Value Ref Range    Peripheral Smear Review see below    PLATELET ESTIMATE   Result Value Ref Range    Plt Estimation Decreased    MORPHOLOGY   Result Value Ref Range    RBC Morphology Present     Polychromia 2+     Poikilocytosis 2+     Ovalocytes 1+     Echinocytes 1+    EKG (NOW)   Result Value Ref Range    Report       [facility] Emergency Dept.    Test Date:  2020  Pt Name:    DEEJAY POON               Department: ER  MRN:        [id number]                      Room:       BL 13  Gender:     Male                         Technician: [id number]  :        [id number]                    Requested By:ER TRIAGE PROTOCOL  Order #:    [id number]                    Reading MD: TASHA CORREA DO    Measurements  Intervals                                Axis  Rate:       114                          P:  MT:                                      QRS:        125  QRSD:       162                          T:          -58  QT:         416  QTc:        574    Interpretive Statements  A-FLUTTER W/ PREDOM 2:1 AV BLOCK, A-RATE 250  NONSPECIFIC INTRAVENTRICULAR CONDUCTION DELAY  BORDERLINE ST DEPRESSION, INFERIOR LEADS  No previous ECG available for comparison  Electronically Signed On 12- 10:45:16 PST by TASHA CORREA DO         RADIOLOGY/PROCEDURES  US-EXTREMITY VENOUS LOWER BILAT   Final Result      DX-CHEST-PORTABLE (1 VIEW)   Final Result      1.  Cardiomegaly.      2.  Bibasilar atelectasis.      EC-ECHOCARDIOGRAM COMPLETE W/ CONT    (Results Pending)   US-RUQ    (Results Pending)   NM-LUNG VENT/PERF IMAGING    (Results Pending)         COURSE & MEDICAL DECISION MAKING  Pertinent Labs & Imaging studies reviewed. (See chart for details)  This is a 85-year-old gentleman presents with shortness of breath, hypoxia.  She does have a history of CLL probably explain why his white blood cell count is 42,000 although I cannot completely exclude sepsis.  He did present with hypoxia, tachycardia and atrial fibrillation with RVR.  The patient received ceftriaxone 1 g IV initially empirically for his sepsis with a lactic acidosis of 4.3.  The patient received IV fluids in the form of 500 mill bolus initially and I was tentatively given too much fluid secondary to that the patient is a 25,222 BMP and concerned that he is in florid heart failure.  EKG does not show ST elevation myocardial infarction.  The patient had a negative Covid test although had elevated D-dimer and was unable to go to CT scan for pulmonary angiogram secondary to his low GFR and chronic kidney disease.  For this reason bilateral  lower extremity Doppler ultrasounds were completed that were negative for DVT.  I believe the patient is probably experiencing congestive heart failure as etiology cannot complete exclude DVT/PE.  The patient is a horrible candidate for anticoagulation secondary to his CLL as well as thrombocytopenia.  I discussed the patient with Dr. Hill who graciously excepts the hospitalization.  Hospitalization the patient has a normal blood pressure, his heart rate come down significantly after receiving IV fluids and he is doing well on high flow nasal cannula placed on secondary to his hypoxia.    CRITICAL CARE  The very real possibilty of a deterioration of this patient's condition required the highest level of my preparedness for sudden, emergent intervention.  I provided critical care services, which included medication orders, frequent reevaluations of the patient's condition and response to treatment, ordering and reviewing test results, and discussing the case with various consultants.  The critical care time associated with the care of the patient was 35 minutes. Review chart for interventions. This time is exclusive of any other billable procedures.       FINAL IMPRESSION  Hypoxia  Congestive heart failure  Sepsis  Critical care time 35 minutes         Electronically signed by: Dakota Cobian D.O., 12/27/2020 9:37 AM

## 2020-12-27 NOTE — ASSESSMENT & PLAN NOTE
AICD for heart failure in place  Not on anticoagulation 2/2 easy bleeding and CML.   Resume carvedilol for rate control with hold parameters.

## 2020-12-27 NOTE — ASSESSMENT & PLAN NOTE
Echo 12/27 showed severe tricuspid regurgitation  Monitor and optimize volume status while inpatient  [facility]

## 2020-12-27 NOTE — ED NOTES
UC obtaining medical records from [facility] in [facility] through [facility].

## 2020-12-27 NOTE — ASSESSMENT & PLAN NOTE
Secondary to acute on chronic heart failure  Echocardiogram 12/27 -LVEF 25%, dilated right ventricle, severe tricuspid regurgitation  CXR pulmonary edema with possible L pleural effusion  Improving with diuretics  bumetanide, spironolactone with hold parameters.      1/7, worse, lasix added     1/8-11, stable  On ~ 4-5 L

## 2020-12-27 NOTE — ASSESSMENT & PLAN NOTE
Echocardiogram 12/27 -LVEF 25%, dilated right ventricle, severe tricuspid regurgitation  Resume carvedilol, losartan, spironolactone with hold parameters.

## 2020-12-27 NOTE — ED NOTES
UNABLE to address [facility] rec at this time  Pt unable to participate in interview  Waiting to speak to pt's Son Antwon [phone] (caregiver)

## 2020-12-27 NOTE — ASSESSMENT & PLAN NOTE
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  Pending cytogenetics to risk stratify which will determine if chemotherapy will be beneficial    1/6, I discussed with oncology Dr. Kumar who does not think he has acute leukemia at this point, but overall has poor prognosis, recommended considering palliative/hospice or [facility].  1/7, patient is interested in hospice, referral has been placed  1/8, accepted by [facility], needs placement

## 2020-12-27 NOTE — ASSESSMENT & PLAN NOTE
AST/ALT normal in 11/2020 at [facility], t bili was 2.4; AST//218, T bili 3.6 admission  History of heavy drinking per patient, none now per his report -though patient reports 12 to 24 pack use  Hepatitis panel negative  US liver ordered - cholelithiasis with borderline gallbladder wall thickening  Likely secondary to hepatic congestion from heart failure, and secondary to his myeloid leukemia  Counseled to stop alcohol, will need regular lab follow-up

## 2020-12-27 NOTE — H&P
Hospital Medicine History & Physical Note    Date of Service  12/27/2020    Primary Care Physician  Pcp Not In Computer    Consultants  none    Code Status  DNAR/DNI    Chief Complaint  Chief Complaint   Patient presents with   • Shortness of Breath     RA sat 86% upon arrival, no home O2   • Weakness     X3 days       History of Presenting Illness  85 y.o. male who presented 12/27/2020 with fatigue, shortness of breath. Patient is a 85 y M w/ PMHx atrial fibrillation, heart failure s/p AICD, CML, prostate cancer s/p radiation therapy, skin cancer, who presents for fatigue and unresponsiveness at home. Patient lives with son, recently moved back to [place] from [place] last week. Patient had fatigue starting yesterday, slept all day, very unresponsive this morning, leading to son calling EMS for evaluation. Patient found to be saturating 86% on room air in ER, not on home oxygen. Patient seen at bedside, on HFNC at 40L, mentating well. He reports difficulty getting up and moving around due to fatigue and dyspnea, otherwise denies chest pain, palpitations, syncope. He reports leg edema and pain which is not necessarily new for him. Discussion with son Antwon on phone who is DPOA [phone], yielded patient had recent valve procedure at [facility] in [place] about a month ago. Not on treatment for CML as patient with major heart comorbidities, plan for watchful waiting. Patient also noted to bleed easily, no major bleeding problems recently.  In the ED, 's in atrial fibrillation, RR 26, on HFNC 40L. WBC 42, Hgb 11.6, Plt 75, BUN/Cr 49/2.85, AST//218, t bili 3.6, LA 4.3, pBNP 25k, d dimer 3.74, procalcitonin elevated. COVID negative. CXR shows cardiomegaly. LE venous US with no DVT b/l. EKG with atrial flutter .   Records from [facility] in Care Everywhere tab in Epic: hospitalization 11/21 for CHF exacerbation, diuresed with bumex, patient had severe tricuspid regurgitation is supposed  to follow up with cardiology clinic, severe MR s/p mitral clip 10/2020.    Review of Systems  Review of Systems   Constitutional: Positive for weight loss. Negative for chills, diaphoresis, fever and malaise/fatigue.   Eyes: Negative for blurred vision and pain.   Respiratory: Positive for shortness of breath. Negative for cough, hemoptysis, sputum production and wheezing.    Cardiovascular: Positive for leg swelling. Negative for chest pain, palpitations and claudication.   Gastrointestinal: Negative for abdominal pain, nausea and vomiting.   Genitourinary: Negative for dysuria and urgency.   Musculoskeletal: Negative for back pain and falls.   Skin: Negative for itching and rash.   Neurological: Negative for dizziness, sensory change, focal weakness and headaches.   Psychiatric/Behavioral: Negative for substance abuse. The patient does not have insomnia.        Past Medical History   has a past medical history of Cancer ([facility]) (2004), CATARACT, CHF (congestive heart failure) ([facility]) (2010), Hypertension, Pain, Psychiatric problem, and Renal failure.    Surgical History   has a past surgical history that includes other orthopedic surgery (1990?); other (2002); and recovery (7/12/2010).     Family History  family history is not on file.     Social History   reports that he has quit smoking. He smoked 0.00 packs per day. He has never used smokeless tobacco. He reports previous alcohol use. He reports that he does not use drugs.    Allergies  No Known Allergies    Medications  Prior to Admission Medications   Prescriptions Last Dose Informant Patient Reported? Taking?   allopurinol (ZYLOPRIM) 300 MG Tab 12/26/2020 at AM Patient Yes No   Sig: Take 150 mg by mouth every morning. 0.5 tablet = 150 mg   bisacodyl (DULCOLAX) 10 MG Suppos > 1 WEEK at PRN Patient Yes No   Sig: Insert 10 mg into the rectum 1 time a day as needed.   carvedilol (COREG) 3.125 MG Tab 12/26/2020 at AM Patient Yes Yes   Sig: Take 3.125 mg by mouth 2  (two) times a day.   docusate sodium (COLACE) 250 MG capsule 12/26/2020 at PM Patient Yes Yes   Sig: Take 250 mg by mouth 2 Times a Day.   furosemide (LASIX) 20 MG TABS 12/26/2020 at PM Patient Yes No   Sig: Take 20 mg by mouth 2 times a day.   hydrALAZINE (APRESOLINE) 25 MG Tab 12/26/2020 at PM Patient Yes Yes   Sig: Take 25 mg by mouth 2 (two) times a day.   isosorbide mononitrate SR (IMDUR) 30 MG TABLET SR 24 HR 12/26/2020 at PM Patient Yes Yes   Sig: Take 30 mg by mouth 2 (two) times a day.   losartan (COZAAR) 25 MG Tab 12/26/2020 at PM Patient Yes Yes   Sig: Take 25 mg by mouth every evening.   metoprolol (LOPRESSOR) 25 MG Tab 12/26/2020 at PM Patient Yes No   Sig: Take 25 mg by mouth 2 times a day. Hold if SBP less than 100 or HR less than 60   oxycodone (OXY-IR) 15 MG immediate release tablet 12/26/2020 at PM Patient Yes No   Sig: Take 15 mg by mouth every 3 hours as needed (PAIN).   potassium chloride SA (KDUR) 20 MEQ Tab CR 12/26/2020 at AM Patient Yes Yes   Sig: Take 20 mEq by mouth every day.   rosuvastatin (CRESTOR) 10 MG Tab 12/26/2020 at PM Patient Yes Yes   Sig: Take 10 mg by mouth every bedtime.   tamsulosin (FLOMAX) 0.4 MG capsule 12/26/2020 at AM Patient Yes No   Sig: Take 0.4 mg by mouth every morning.      Facility-Administered Medications: None       Physical Exam  Temp:  [37.9 °C (100.2 °F)] 37.9 °C (100.2 °F)  Pulse:  [0-114] 95  Resp:  [20-29] 21  BP: ()/(58-70) 98/58  SpO2:  [68 %-97 %] 97 %    Physical Exam  Constitutional:       General: He is not in acute distress.     Appearance: He is not ill-appearing.   HENT:      Head: Normocephalic and atraumatic.      Right Ear: External ear normal.      Left Ear: External ear normal.      Mouth/Throat:      Pharynx: No oropharyngeal exudate or posterior oropharyngeal erythema.   Eyes:      General: Scleral icterus present.      Extraocular Movements: Extraocular movements intact.      Pupils: Pupils are equal, round, and reactive to light.    Neck:      Musculoskeletal: Normal range of motion and neck supple.   Cardiovascular:      Rate and Rhythm: Regular rhythm. Tachycardia present.      Pulses: Normal pulses.      Heart sounds: Murmur present.   Pulmonary:      Effort: Respiratory distress present.      Breath sounds: Rhonchi present. No wheezing or rales.   Abdominal:      General: Bowel sounds are normal. There is no distension.      Palpations: Abdomen is soft.      Tenderness: There is no abdominal tenderness. There is no guarding.   Musculoskeletal:         General: No swelling or tenderness.      Right lower leg: Edema present.      Left lower leg: Edema present.   Skin:     General: Skin is warm and dry.   Neurological:      General: No focal deficit present.      Mental Status: He is oriented to person, place, and time.      Sensory: No sensory deficit.      Motor: No weakness.   Psychiatric:         Mood and Affect: Mood normal.         Behavior: Behavior normal.         Laboratory:  Recent Labs     12/27/20  0940   WBC 42.0*   RBC 3.63*   HEMOGLOBIN 11.6*   HEMATOCRIT 36.9*   .7*   MCH 32.0   MCHC 31.4*   RDW 76.9*   PLATELETCT 75*   MPV 11.6     Recent Labs     12/27/20  0940   SODIUM 136   POTASSIUM 5.4   CHLORIDE 102   CO2 14*   GLUCOSE 89   BUN 49*   CREATININE 2.85*   CALCIUM 9.6     Recent Labs     12/27/20  0940   ALTSGPT 218*   ASTSGOT 281*   ALKPHOSPHAT 115*   TBILIRUBIN 3.6*   GLUCOSE 89         Recent Labs     12/27/20  0940   NTPROBNP 56923*         Recent Labs     12/27/20  0940   TROPONINT 35*       Imaging:  US-EXTREMITY VENOUS LOWER BILAT   Final Result      DX-CHEST-PORTABLE (1 VIEW)   Final Result      1.  Cardiomegaly.      2.  Bibasilar atelectasis.      EC-ECHOCARDIOGRAM COMPLETE W/ CONT    (Results Pending)   NM-LUNG PERFUSION IMAGING    (Results Pending)   US-RUQ    (Results Pending)         Assessment/Plan:  I anticipate this patient will require at least two midnights for appropriate medical management,  "necessitating inpatient admission.    * Acute respiratory failure with hypoxia (HCC)  Assessment & Plan  Presented 12/27 for fatigue, unresponsiveness at home  86% on room air on presentation  Now on HFNC 40L  DDx: heart failure due to valve disease, DVT/PE  US LE without DVT  VQ scan STAT pending  Echocardiogram ordered to evaluate CHF/valve disease  Borderline hypotensive in ER, hold fluids and diuresis until echo completed.  Has hx of CML, easy bleeding, low platelets, hold anticoagulation for now as pt is intermediate risk for PE    Acute on chronic congestive heart failure (HCC)  Assessment & Plan  Echocardiogram ordered  Hold diuresis for now as pt is borderline hypotensive  Is supposed to be on bumex per [facility] discharge summary, seen in \"care everywhere\" tab  Likely will need cardiology consult    Elevated liver enzymes  Assessment & Plan  AST/ALT normal in 11/2020 at [facility], t bili was 2.4  History of heavy drinking per patient, none now per his report  Hepatitis panel ordered  US liver ordered to evaluate further, possible cirrhosis?    JC (acute kidney injury) ([facility])  Assessment & Plan  Cr 2.2 in 11/2020 at [facility]  Elevated Cr 2.8 here in ER  Unclear etiology  Possibly due to cardiorenal syndrome? Patient with 2+ LE edema, but likely hypovolemic intravascularly  Given IV fluid bolus in ER, will hold IV fluids and diuresis for now until echocardiogram completed  Monitor  Consider nephrology consult if not improving    CAP (community acquired pneumonia)  Assessment & Plan  WBC elevated in setting of CML  procalcitonin elevated  Patient hypoxic, no other source of infection identified  Will treat empirically for CAP with ceftriaxone and doxycycline    Chronic atrial fibrillation (HCC)  Assessment & Plan  Not in rapid ventricular response  Does have AICD for heart failure in place  Continue home medications, not on anticoagulation per records review, likely due to hx easy bleeding and CML  Continue home " carvedilol, titrate up dose as appropriate    Tricuspid regurgitation  Assessment & Plan  Is supposed to follow up with cardiology clinic in [place], but patient moved back here to [place] last week  Likely will need to establish with cardiology here in [place]  Likely cardiology consult after echocardiogram completed    Mitral regurgitation  Assessment & Plan  History of, s/p mitral valve clip in 10/2020  Echocardiogram ordered

## 2020-12-28 ENCOUNTER — APPOINTMENT (OUTPATIENT)
Dept: RADIOLOGY | Facility: MEDICAL CENTER | Age: 85
DRG: 189 | End: 2020-12-28
Attending: STUDENT IN AN ORGANIZED HEALTH CARE EDUCATION/TRAINING PROGRAM
Payer: COMMERCIAL

## 2020-12-28 PROBLEM — L97.519: Status: ACTIVE | Noted: 2020-12-28

## 2020-12-28 PROBLEM — D72.821 MONOCYTOSIS: Status: ACTIVE | Noted: 2020-12-28

## 2020-12-28 LAB
ALBUMIN SERPL BCP-MCNC: 3.4 G/DL (ref 3.2–4.9)
ALBUMIN/GLOB SERPL: 1.4 G/DL
ALP SERPL-CCNC: 89 U/L (ref 30–99)
ALT SERPL-CCNC: 180 U/L (ref 2–50)
AMMONIA PLAS-SCNC: 36 UMOL/L (ref 11–45)
ANION GAP SERPL CALC-SCNC: 16 MMOL/L (ref 7–16)
ANISOCYTOSIS BLD QL SMEAR: ABNORMAL
AST SERPL-CCNC: 161 U/L (ref 12–45)
BASOPHILS # BLD AUTO: 0 % (ref 0–1.8)
BASOPHILS # BLD: 0 K/UL (ref 0–0.12)
BILIRUB SERPL-MCNC: 2.1 MG/DL (ref 0.1–1.5)
BUN SERPL-MCNC: 49 MG/DL (ref 8–22)
BURR CELLS BLD QL SMEAR: NORMAL
CALCIUM SERPL-MCNC: 8.7 MG/DL (ref 8.5–10.5)
CHLORIDE SERPL-SCNC: 107 MMOL/L (ref 96–112)
CO2 SERPL-SCNC: 13 MMOL/L (ref 20–33)
CREAT SERPL-MCNC: 2.75 MG/DL (ref 0.5–1.4)
EOSINOPHIL # BLD AUTO: 0 K/UL (ref 0–0.51)
EOSINOPHIL NFR BLD: 0 % (ref 0–6.9)
ERYTHROCYTE [DISTWIDTH] IN BLOOD BY AUTOMATED COUNT: 75.9 FL (ref 35.9–50)
GLOBULIN SER CALC-MCNC: 2.4 G/DL (ref 1.9–3.5)
GLUCOSE SERPL-MCNC: 101 MG/DL (ref 65–99)
HAV IGM SERPL QL IA: NORMAL
HBV CORE IGM SER QL: NORMAL
HBV SURFACE AG SER QL: NORMAL
HCT VFR BLD AUTO: 31.1 % (ref 42–52)
HCV AB SER QL: NORMAL
HGB BLD-MCNC: 9.7 G/DL (ref 14–18)
HYPOCHROMIA BLD QL SMEAR: ABNORMAL
LACTATE BLD-SCNC: 1.7 MMOL/L (ref 0.5–2)
LV EJECT FRACT MOD 2C 99903: 67.04
LV EJECT FRACT MOD 4C 99902: 23.5
LV EJECT FRACT MOD BP 99901: 50.18
LYMPHOCYTES # BLD AUTO: 0.83 K/UL (ref 1–4.8)
LYMPHOCYTES NFR BLD: 2.6 % (ref 22–41)
MACROCYTES BLD QL SMEAR: ABNORMAL
MANUAL DIFF BLD: NORMAL
MCH RBC QN AUTO: 31.7 PG (ref 27–33)
MCHC RBC AUTO-ENTMCNC: 31.2 G/DL (ref 33.7–35.3)
MCV RBC AUTO: 101.6 FL (ref 81.4–97.8)
MICROCYTES BLD QL SMEAR: ABNORMAL
MONOCYTES # BLD AUTO: 10.24 K/UL (ref 0–0.85)
MONOCYTES NFR BLD AUTO: 32.2 % (ref 0–13.4)
MORPHOLOGY BLD-IMP: NORMAL
NEUTROPHILS # BLD AUTO: 20.73 K/UL (ref 1.82–7.42)
NEUTROPHILS NFR BLD: 65.2 % (ref 44–72)
NRBC # BLD AUTO: 0.07 K/UL
NRBC BLD-RTO: 0.2 /100 WBC
OVALOCYTES BLD QL SMEAR: NORMAL
PATH REV: NORMAL
PATH REV: NORMAL
PLATELET # BLD AUTO: 61 K/UL (ref 164–446)
PLATELET BLD QL SMEAR: NORMAL
PMV BLD AUTO: 10.2 FL (ref 9–12.9)
POLYCHROMASIA BLD QL SMEAR: NORMAL
POTASSIUM SERPL-SCNC: 4.9 MMOL/L (ref 3.6–5.5)
PROT SERPL-MCNC: 5.8 G/DL (ref 6–8.2)
RBC # BLD AUTO: 3.06 M/UL (ref 4.7–6.1)
RBC BLD AUTO: PRESENT
SODIUM SERPL-SCNC: 136 MMOL/L (ref 135–145)
WBC # BLD AUTO: 31.8 K/UL (ref 4.8–10.8)

## 2020-12-28 PROCEDURE — 99233 SBSQ HOSP IP/OBS HIGH 50: CPT | Mod: GC | Performed by: HOSPITALIST

## 2020-12-28 PROCEDURE — 73660 X-RAY EXAM OF TOE(S): CPT | Mod: RT

## 2020-12-28 PROCEDURE — 82140 ASSAY OF AMMONIA: CPT

## 2020-12-28 PROCEDURE — 83605 ASSAY OF LACTIC ACID: CPT

## 2020-12-28 PROCEDURE — A9270 NON-COVERED ITEM OR SERVICE: HCPCS | Performed by: STUDENT IN AN ORGANIZED HEALTH CARE EDUCATION/TRAINING PROGRAM

## 2020-12-28 PROCEDURE — 85027 COMPLETE CBC AUTOMATED: CPT

## 2020-12-28 PROCEDURE — 700105 HCHG RX REV CODE 258: Performed by: STUDENT IN AN ORGANIZED HEALTH CARE EDUCATION/TRAINING PROGRAM

## 2020-12-28 PROCEDURE — 80074 ACUTE HEPATITIS PANEL: CPT

## 2020-12-28 PROCEDURE — 700102 HCHG RX REV CODE 250 W/ 637 OVERRIDE(OP): Performed by: STUDENT IN AN ORGANIZED HEALTH CARE EDUCATION/TRAINING PROGRAM

## 2020-12-28 PROCEDURE — 700111 HCHG RX REV CODE 636 W/ 250 OVERRIDE (IP): Performed by: STUDENT IN AN ORGANIZED HEALTH CARE EDUCATION/TRAINING PROGRAM

## 2020-12-28 PROCEDURE — 80053 COMPREHEN METABOLIC PANEL: CPT

## 2020-12-28 PROCEDURE — 85007 BL SMEAR W/DIFF WBC COUNT: CPT

## 2020-12-28 PROCEDURE — 93306 TTE W/DOPPLER COMPLETE: CPT | Mod: 26 | Performed by: STUDENT IN AN ORGANIZED HEALTH CARE EDUCATION/TRAINING PROGRAM

## 2020-12-28 PROCEDURE — 770020 HCHG ROOM/CARE - TELE (206)

## 2020-12-28 PROCEDURE — 36415 COLL VENOUS BLD VENIPUNCTURE: CPT

## 2020-12-28 PROCEDURE — 71045 X-RAY EXAM CHEST 1 VIEW: CPT

## 2020-12-28 RX ORDER — DOXYCYCLINE 100 MG/1
100 TABLET ORAL EVERY 12 HOURS
Status: COMPLETED | OUTPATIENT
Start: 2020-12-28 | End: 2021-01-02

## 2020-12-28 RX ORDER — ACETAMINOPHEN 500 MG
500 TABLET ORAL EVERY 6 HOURS PRN
Status: DISCONTINUED | OUTPATIENT
Start: 2020-12-28 | End: 2021-01-04

## 2020-12-28 RX ORDER — OXYCODONE HYDROCHLORIDE 10 MG/1
10 TABLET ORAL EVERY 4 HOURS PRN
Status: DISCONTINUED | OUTPATIENT
Start: 2020-12-28 | End: 2021-01-13 | Stop reason: HOSPADM

## 2020-12-28 RX ADMIN — ISOSORBIDE MONONITRATE 30 MG: 30 TABLET, EXTENDED RELEASE ORAL at 05:23

## 2020-12-28 RX ADMIN — TAMSULOSIN HYDROCHLORIDE 0.4 MG: 0.4 CAPSULE ORAL at 05:23

## 2020-12-28 RX ADMIN — DOXYCYCLINE 100 MG: 100 TABLET, FILM COATED ORAL at 17:46

## 2020-12-28 RX ADMIN — HYDRALAZINE HYDROCHLORIDE 25 MG: 25 TABLET, FILM COATED ORAL at 17:46

## 2020-12-28 RX ADMIN — CEFEPIME 2 G: 2 INJECTION, POWDER, FOR SOLUTION INTRAVENOUS at 08:28

## 2020-12-28 RX ADMIN — DOCUSATE SODIUM 50 MG AND SENNOSIDES 8.6 MG 2 TABLET: 8.6; 5 TABLET, FILM COATED ORAL at 05:23

## 2020-12-28 RX ADMIN — DOCUSATE SODIUM 50 MG AND SENNOSIDES 8.6 MG 2 TABLET: 8.6; 5 TABLET, FILM COATED ORAL at 17:46

## 2020-12-28 RX ADMIN — HEPARIN SODIUM 5000 UNITS: 5000 INJECTION, SOLUTION INTRAVENOUS; SUBCUTANEOUS at 14:26

## 2020-12-28 RX ADMIN — CARVEDILOL 3.12 MG: 3.12 TABLET, FILM COATED ORAL at 05:27

## 2020-12-28 RX ADMIN — CARVEDILOL 3.12 MG: 3.12 TABLET, FILM COATED ORAL at 17:46

## 2020-12-28 RX ADMIN — ACETAMINOPHEN 500 MG: 500 TABLET ORAL at 17:46

## 2020-12-28 RX ADMIN — ISOSORBIDE MONONITRATE 30 MG: 30 TABLET, EXTENDED RELEASE ORAL at 17:46

## 2020-12-28 RX ADMIN — DOXYCYCLINE 100 MG: 100 TABLET, FILM COATED ORAL at 05:23

## 2020-12-28 RX ADMIN — HEPARIN SODIUM 5000 UNITS: 5000 INJECTION, SOLUTION INTRAVENOUS; SUBCUTANEOUS at 22:10

## 2020-12-28 RX ADMIN — OXYCODONE HYDROCHLORIDE 10 MG: 10 TABLET ORAL at 14:41

## 2020-12-28 RX ADMIN — CEFTRIAXONE SODIUM 1 G: 1 INJECTION, POWDER, FOR SOLUTION INTRAMUSCULAR; INTRAVENOUS at 05:30

## 2020-12-28 RX ADMIN — HYDRALAZINE HYDROCHLORIDE 25 MG: 25 TABLET, FILM COATED ORAL at 05:23

## 2020-12-28 RX ADMIN — ALLOPURINOL 150 MG: 300 TABLET ORAL at 05:23

## 2020-12-28 RX ADMIN — HEPARIN SODIUM 5000 UNITS: 5000 INJECTION, SOLUTION INTRAVENOUS; SUBCUTANEOUS at 05:22

## 2020-12-28 ASSESSMENT — ENCOUNTER SYMPTOMS
WHEEZING: 0
COUGH: 0
WEIGHT LOSS: 1
ABDOMINAL PAIN: 0
HEADACHES: 0
NAUSEA: 0
TREMORS: 0
WEAKNESS: 0
FEVER: 0
NECK PAIN: 0
MEMORY LOSS: 0
CHILLS: 0
TINGLING: 0
BRUISES/BLEEDS EASILY: 0
DIZZINESS: 0
DEPRESSION: 0
BACK PAIN: 0
SORE THROAT: 0
PALPITATIONS: 0
NERVOUS/ANXIOUS: 0
CONSTIPATION: 0
SINUS PAIN: 0
SHORTNESS OF BREATH: 1
VOMITING: 0
DIARRHEA: 0

## 2020-12-28 ASSESSMENT — FIBROSIS 4 INDEX: FIB4 SCORE: 16.33

## 2020-12-28 NOTE — ED NOTES
Pt laying in bed resting with eyes closed, resp e/u, bed rails up and bed in low position, NAD, VSS, will continue to monitor

## 2020-12-28 NOTE — PROGRESS NOTES
Pt arrived to unit via gurney at 2220. Pt oriented to room, unit, and plan of care. Tele-monitor placed and monitor room notified. All questions answered at this time. Call light within reach; fall precautions in place.

## 2020-12-28 NOTE — PROGRESS NOTES
IR Nursing Note:    Order received for bone marrow biopsy.  Per Dr. Callejas this is to be completed by the hospitalist.  Dr. Sanchez updated.  Will cancel current order.

## 2020-12-28 NOTE — PROGRESS NOTES
Bedside report received. Pt appears to be resting comfortably. Call light and belongings within reach. Bed locked and in lowest position. Alarm and fall precautions in place.

## 2020-12-28 NOTE — CARE PLAN
Problem: Safety  Goal: Will remain free from falls  Outcome: PROGRESSING AS EXPECTED  Intervention: Implement fall precautions  Flowsheets  Taken 12/28/2020 0933  Bed Alarm: Yes - Alarm On  Bedrails: Bedrails Closest to Bathroom Down  Chair/Bed Strip Alarm: Yes - Alarm On  Taken 12/28/2020 0730  Environmental Precautions:   Treaded Slipper Socks on Patient   Personal Belongings, Wastebasket, Call Bell etc. in Easy Reach   Transferred to Stronger Side   Report Given to Other Health Care Providers Regarding Fall Risk   Bed in Low Position   Communication Sign for Patients & Families   Mobility Assessed & Appropriate Sign Placed     Problem: Skin Integrity  Goal: Risk for impaired skin integrity will decrease  Outcome: PROGRESSING AS EXPECTED  Intervention: Implement precautions to protect skin integrity in collaboration with the interdisciplinary team  Flowsheets (Taken 12/28/2020 0730)  Skin Preventative Measures:   Pillows in Use for Support / Positioning   Pillows in Use to Float Heels   Silicone Oxygen Tubing in Use   Gray Foam for Oxygen Tubing (Pad ear protector)   Remove Glasses While Patient is Sleeping  Bed Types: Pressure Redistribution Mattress (Atmosair)  Friction Interventions: Draw Sheet / Pad Used for Repositioning  Patient Turns / Repositioning: Supine  Moisturizers/Barriers:   Barrier Cream   Moisturizer  Patient is Receiving Nutrition: Oral Intake Adequate  Vitamin Therapy in Use: No  Activity: Bed  Assistance / Tolerance for Turning/Repositioning: Assistance of One  Note: Turning every two hours.

## 2020-12-28 NOTE — ASSESSMENT & PLAN NOTE
Ulcer appears clean and dry, no bone penetration. Degenerative changes on x-ray but not correlated to area of ulcer, no osteomyelitis.  -No further wound care needs

## 2020-12-28 NOTE — ASSESSMENT & PLAN NOTE
Marked monocytosis with erythroblasts/pRBCs, bone marrow biopsy recommended to exclude leukemia, myeloproliferative disorders  43.70% monocytes on 12/29 AM CBC  Bone marrow biopsy consistent with CMML, plan [facility]

## 2020-12-28 NOTE — PROGRESS NOTES
Daily Progress Note:     Date of Service: 12/28/2020  Primary Team: [facility] Team   Attending: NITA Wheat M.D.   Senior Resident: Dr. Haji  Intern: Dr. Sanchez  Contact:  [phone]    Chief Complaint:   Shortness of breath, weakness    Subjective:  Patient reports that he feels better today.  Reports improved shortness of breath, feels less weak.  Alert and oriented x4 today.  No chest pain, nausea, vomiting, fever, chills.  On physical exam during rounds today, chronic venous stasis changers in the legs bilaterally were noted, ulcer on right great toe noted patient reports chronic for months.    Blood Cultures x2+ for gram-negative rods, started cefepime.  X-ray of chest AP view plus x-ray of right great toe pending.  PT OT eval pending for placement.    Discussed peripheral smear with Dr. Cuello. Marked monocytosis with erythroblasts/pRBCs noted, bone marrow biopsy recommended to exclude leukemia, myeloproliferative disorders. 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 Patient agreeable to bone marrow biopsy, contacting procedures hospitalist.    Consultants/Specialty:  N/A  Review of Systems:    Review of Systems   Constitutional: Positive for malaise/fatigue and weight loss. Negative for chills and fever.   HENT: Negative for congestion, ear discharge, ear pain, sinus pain and sore throat.    Respiratory: Positive for shortness of breath. Negative for cough and wheezing.    Cardiovascular: Negative for chest pain, palpitations and leg swelling.   Gastrointestinal: Negative for abdominal pain, constipation, diarrhea, nausea and vomiting.   Genitourinary: Negative for dysuria, frequency, hematuria and urgency.   Musculoskeletal: Negative for back pain, joint pain and neck pain.   Skin: Negative for itching and rash.   Neurological: Negative for dizziness, tingling, tremors, weakness and headaches.   Endo/Heme/Allergies: Does not bruise/bleed easily.    Psychiatric/Behavioral: Negative for depression and memory loss. The patient is not nervous/anxious.        Objective Data:   Physical Exam:   Vitals:   Temp:  [36.1 °C (97 °F)-36.7 °C (98.1 °F)] 36.5 °C (97.7 °F)  Pulse:  [] 78  Resp:  [12-19] 18  BP: ()/(51-90) 102/58  SpO2:  [90 %-97 %] 91 %     Physical Exam  Constitutional:       General: He is not in acute distress.     Appearance: He is not diaphoretic.   HENT:      Head: Normocephalic and atraumatic.      Right Ear: External ear normal.      Left Ear: External ear normal.      Nose: Nose normal.      Mouth/Throat:      Pharynx: No oropharyngeal exudate or posterior oropharyngeal erythema.   Eyes:      Extraocular Movements: Extraocular movements intact.      Pupils: Pupils are equal, round, and reactive to light.   Neck:      Musculoskeletal: Normal range of motion. No neck rigidity or muscular tenderness.   Cardiovascular:      Rate and Rhythm: Normal rate.      Pulses: Normal pulses.      Heart sounds: Murmur present. No friction rub. No gallop.       Comments: Paced rhythm  Pulmonary:      Effort: Pulmonary effort is normal. No respiratory distress.      Breath sounds: Normal breath sounds. No stridor.   Abdominal:      General: There is no distension.      Palpations: There is no mass.      Tenderness: There is no abdominal tenderness.   Musculoskeletal:      Right lower leg: Edema present.      Left lower leg: Edema present.      Comments: 1+ edema in legs, chronic venous stasis changes present   Skin:     Capillary Refill: Capillary refill takes less than 2 seconds.      Findings: No bruising, erythema, lesion or rash.   Neurological:      General: No focal deficit present.      Mental Status: He is alert and oriented to person, place, and time.      Motor: No weakness.   Psychiatric:         Mood and Affect: Mood normal.         Behavior: Behavior normal.         Thought Content: Thought content normal.         Judgment: Judgment normal.            Labs:   Recent Results (from the past 24 hour(s))   EC-ECHOCARDIOGRAM COMPLETE W/O CONT    Collection Time: 12/27/20  7:08 PM   Result Value Ref Range    Eject.Frac. MOD BP 50.18     Eject.Frac. MOD 4C 23.5     Eject.Frac. MOD 2C 67.04    Lactic acid (lactate): Repeat if initial lactic acid result is greater than 2    Collection Time: 12/28/20 12:22 AM   Result Value Ref Range    Lactic Acid 1.7 0.5 - 2.0 mmol/L   AMMONIA    Collection Time: 12/28/20 12:22 AM   Result Value Ref Range    Ammonia 36 11 - 45 umol/L   HEPATITIS PANEL ACUTE(4 COMPONENTS)    Collection Time: 12/28/20 12:22 AM   Result Value Ref Range    Hepatitis B Surface Antigen Non-Reactive Non-Reactive    Hepatitis B Cors Ab,IgM Non-Reactive Non-Reactive    Hepatitis A Virus Ab, IgM Non-Reactive Non-Reactive    Hepatitis C Antibody Non-Reactive Non-Reactive   CBC with Differential    Collection Time: 12/28/20 12:22 AM   Result Value Ref Range    WBC 31.8 (HH) 4.8 - 10.8 K/uL    RBC 3.06 (L) 4.70 - 6.10 M/uL    Hemoglobin 9.7 (L) 14.0 - 18.0 g/dL    Hematocrit 31.1 (L) 42.0 - 52.0 %    .6 (H) 81.4 - 97.8 fL    MCH 31.7 27.0 - 33.0 pg    MCHC 31.2 (L) 33.7 - 35.3 g/dL    RDW 75.9 (H) 35.9 - 50.0 fL    Platelet Count 61 (L) 164 - 446 K/uL    MPV 10.2 9.0 - 12.9 fL    Neutrophils-Polys 65.20 44.00 - 72.00 %    Lymphocytes 2.60 (L) 22.00 - 41.00 %    Monocytes 32.20 (H) 0.00 - 13.40 %    Eosinophils 0.00 0.00 - 6.90 %    Basophils 0.00 0.00 - 1.80 %    Nucleated RBC 0.20 /100 WBC    Neutrophils (Absolute) 20.73 (H) 1.82 - 7.42 K/uL    Lymphs (Absolute) 0.83 (L) 1.00 - 4.80 K/uL    Monos (Absolute) 10.24 (H) 0.00 - 0.85 K/uL    Eos (Absolute) 0.00 0.00 - 0.51 K/uL    Baso (Absolute) 0.00 0.00 - 0.12 K/uL    NRBC (Absolute) 0.07 K/uL    Hypochromia 1+     Anisocytosis 1+     Macrocytosis 1+     Microcytosis 1+    Comp Metabolic Panel (CMP)    Collection Time: 12/28/20 12:22 AM   Result Value Ref Range    Sodium 136 135 - 145 mmol/L     Potassium 4.9 3.6 - 5.5 mmol/L    Chloride 107 96 - 112 mmol/L    Co2 13 (L) 20 - 33 mmol/L    Anion Gap 16.0 7.0 - 16.0    Glucose 101 (H) 65 - 99 mg/dL    Bun 49 (H) 8 - 22 mg/dL    Creatinine 2.75 (H) 0.50 - 1.40 mg/dL    Calcium 8.7 8.5 - 10.5 mg/dL    AST(SGOT) 161 (H) 12 - 45 U/L    ALT(SGPT) 180 (H) 2 - 50 U/L    Alkaline Phosphatase 89 30 - 99 U/L    Total Bilirubin 2.1 (H) 0.1 - 1.5 mg/dL    Albumin 3.4 3.2 - 4.9 g/dL    Total Protein 5.8 (L) 6.0 - 8.2 g/dL    Globulin 2.4 1.9 - 3.5 g/dL    A-G Ratio 1.4 g/dL   ESTIMATED GFR    Collection Time: 12/28/20 12:22 AM   Result Value Ref Range    GFR If  27 (A) >60 mL/min/1.73 m 2    GFR If Non African American 22 (A) >60 mL/min/1.73 m 2   DIFFERENTIAL MANUAL    Collection Time: 12/28/20 12:22 AM   Result Value Ref Range    Manual Diff Status PERFORMED    PERIPHERAL SMEAR REVIEW    Collection Time: 12/28/20 12:22 AM   Result Value Ref Range    Peripheral Smear Review see below    PLATELET ESTIMATE    Collection Time: 12/28/20 12:22 AM   Result Value Ref Range    Plt Estimation Decreased    MORPHOLOGY    Collection Time: 12/28/20 12:22 AM   Result Value Ref Range    RBC Morphology Present     Polychromia 1+     Ovalocytes 1+     Echinocytes 2+      Imaging:   US-RUQ   Final Result      Cholelithiasis with borderline gallbladder wall thickening which may related to right heart failure (versus cholecystitis) as there is evidence of pleural fluid and ascites with bidirectional flow in the main portal vein.      Echogenic right kidney with renal cortical thinning consistent with medical renal disease.      EC-ECHOCARDIOGRAM COMPLETE W/O CONT   Final Result      NM-LUNG VENT/PERF IMAGING   Final Result      Low probability VQ scan.      US-EXTREMITY VENOUS LOWER BILAT   Final Result      DX-CHEST-PORTABLE (1 VIEW)   Final Result      1.  Cardiomegaly.      2.  Bibasilar atelectasis.      DX-TOE(S) 2+ RIGHT    (Results Pending)   DX-CHEST-PORTABLE (1  VIEW)    (Results Pending)     Problem Representation: Patient is a 85-year-old male with a past medical history of heart failure reduced ejection fraction 20%, A. fib status post AICD placement 4916-3463, pulmonary hypertension, severe tricuspid regurgitation, mitral valve regurgitation status post intervention October 2020, CML, prostate cancer status post resection, skin cancer who presents with shortness of breath and weakness for 3 days, edema in legs. Peripheral smear showed monocytosis, bone marrow biopsy ordered to r/o leukemia, myeloproliferative disorders.    * Acute respiratory failure with hypoxia (HCC)- (present on admission)  Assessment & Plan  Presented 12/27 for fatigue, unresponsiveness at home  86% on room air on presentation, was on HFNC, now on NC 6L  DDx: Pneumonia/right foot ulcer, HFrEF, DVT/PE  US LE without DVT, V/Q scan negative  Echocardiogram 12/27 -LVEF 25%, dilated right ventricle, severe tricuspid regurgitation  Borderline hypotensive in ER, 1 L bolus was given in ER, hold fluids and diuresis   Has hx of CML, easy bleeding, low platelets, hold anticoagulation for now as pt is intermediate risk for PE  PLAN:  -Continue to monitor, improving without diuresis  -Likely not due to CHF as echocardiogram is largely unchanged from echo on 11/30/2020  -Likely secondary to infectious source either lung or toe    Monocytosis- (present on admission)  Assessment & Plan  Discussed peripheral smear with Dr. Cuello  - marked monocytosis with erythroblasts/pRBCs, bone marrow biopsy recommended to exclude leukemia, myeloproliferative disorders  PLAN:  - IR consult for bone marrow biopsy placed, patient agreeable to bone marrow biopsy    Leukocytosis- (present on admission)  Assessment & Plan  WBC 42.0 in setting of CML?, dropped to 31.8 next day, blast crisis active versus sepsis versus leukocytosis  Patient hypoxic, other possible source of infection includes right toe chronic ulcer or leg  "cellulitis  Chest X-ray showed possible opacities in right lung  Blood Cultures x2+ for gram-negative rods  Plan:  Will treat empirically for CAP with cefepime and doxycycline  Repeat AP chest x-ray    Acute on chronic congestive heart failure (HCC)- (present on admission)  Assessment & Plan  Echocardiogram 12/27 -LVEF 25%, dilated right ventricle, severe tricuspid regurgitation  Hold diuresis for now as pt is borderline hypotensive  Reports taking bumex per [facility] discharge summary, seen in \"care everywhere\" tab    Ulcer of great toe, right, with unspecified severity (HCC)- (present on admission)  Assessment & Plan  Ulcer right great toe noted, does not appear to penetrate muscle or to bone  Chronic for several months at least per patient  Plan:  Order x-ray of right great toe to rule out osteomyelitis  -Doxycycline also ordered for gram-positive coverage and MRSA coverage    JC (acute kidney injury) ([facility])- (present on admission)  Assessment & Plan  Cr 2.2 in 11/2020 at [facility]  Elevated Cr 2.8 here in ER  Possible prerenal due to hypovalemia  Given IV fluid bolus in ER, will hold IV fluids and diuresis due due HfREF  Plan:  Monitor  Consider nephrology consult if not improving    Tricuspid regurgitation- (present on admission)  Assessment & Plan  Echo 12/27 showed severe tricuspid regurgitation  Recommend to to establish with cardiology here in [place]  Discussed with cardiology, tricuspid clips are not done in [place] and are referred to [facility] on outpatient basis    Mitral regurgitation- (present on admission)  Assessment & Plan  History of, s/p mitral valve clip in 10/2020  Echocardiogram 12/27 showed mild mitral stenosis    Chronic atrial fibrillation (HCC)- (present on admission)  Assessment & Plan  Not in rapid ventricular response  Does have AICD for heart failure in place  Continue home medications, not on anticoagulation per records review, likely due to hx easy bleeding and CML  Continue home carvedilol, " titrate up dose as appropriate    Elevated liver enzymes- (present on admission)  Assessment & Plan  AST/ALT normal in 11/2020 at [facility], t bili was 2.4; AST//218, T bili 3.6 admission  History of heavy drinking per patient, none now per his report -though patient reports 12 to 24 pack use appear  Hepatitis panel negative  US liver ordered - cholelithiasis with borderline gallbladder wall thickening, right heart failure (versus cholecystitis) , echogenic right kidney with renal cortical thinning   PLAN:  Outpatient GI follow-up  Advised to quit drinking alcohol    * Code Status: DNR/DNI  * Diet: Cardiac/Renal  * Lines/Tubes/Drains: PIV  * IVF: none indicated  * Prophylaxis:        -- DVT: heparin        -- GI: omeprazole  * PCP:   * Social / DC planning: likely to TBD once acute illness(es) have been addressed  * Dispo: To be determined, pending PT OT eval

## 2020-12-29 ENCOUNTER — APPOINTMENT (OUTPATIENT)
Dept: RADIOLOGY | Facility: MEDICAL CENTER | Age: 85
DRG: 189 | End: 2020-12-29
Attending: STUDENT IN AN ORGANIZED HEALTH CARE EDUCATION/TRAINING PROGRAM
Payer: COMMERCIAL

## 2020-12-29 ENCOUNTER — PATIENT OUTREACH (OUTPATIENT)
Dept: HEALTH INFORMATION MANAGEMENT | Facility: OTHER | Age: 85
End: 2020-12-29

## 2020-12-29 PROBLEM — N40.0 BPH (BENIGN PROSTATIC HYPERPLASIA): Status: ACTIVE | Noted: 2020-12-29

## 2020-12-29 PROBLEM — R78.81 E COLI BACTEREMIA: Status: ACTIVE | Noted: 2020-12-29

## 2020-12-29 PROBLEM — B96.20 E COLI BACTEREMIA: Status: ACTIVE | Noted: 2020-12-29

## 2020-12-29 LAB
ALBUMIN SERPL BCP-MCNC: 3.6 G/DL (ref 3.2–4.9)
ALBUMIN/GLOB SERPL: 1.5 G/DL
ALP SERPL-CCNC: 87 U/L (ref 30–99)
ALT SERPL-CCNC: 132 U/L (ref 2–50)
ANION GAP SERPL CALC-SCNC: 12 MMOL/L (ref 7–16)
AST SERPL-CCNC: 68 U/L (ref 12–45)
BACTERIA BLD CULT: ABNORMAL
BACTERIA UR CULT: NORMAL
BASOPHILS # BLD AUTO: 0.1 % (ref 0–1.8)
BASOPHILS # BLD: 0.01 K/UL (ref 0–0.12)
BILIRUB SERPL-MCNC: 1.5 MG/DL (ref 0.1–1.5)
BUN SERPL-MCNC: 52 MG/DL (ref 8–22)
CALCIUM SERPL-MCNC: 8.5 MG/DL (ref 8.5–10.5)
CHLORIDE SERPL-SCNC: 105 MMOL/L (ref 96–112)
CO2 SERPL-SCNC: 17 MMOL/L (ref 20–33)
CREAT SERPL-MCNC: 2.5 MG/DL (ref 0.5–1.4)
EOSINOPHIL # BLD AUTO: 0 K/UL (ref 0–0.51)
EOSINOPHIL NFR BLD: 0 % (ref 0–6.9)
ERYTHROCYTE [DISTWIDTH] IN BLOOD BY AUTOMATED COUNT: 74.7 FL (ref 35.9–50)
GLOBULIN SER CALC-MCNC: 2.4 G/DL (ref 1.9–3.5)
GLUCOSE SERPL-MCNC: 139 MG/DL (ref 65–99)
HCT VFR BLD AUTO: 28.1 % (ref 42–52)
HGB BLD-MCNC: 9 G/DL (ref 14–18)
IL6 SERPL-MCNC: 214 PG/ML
IMM GRANULOCYTES # BLD AUTO: 0.04 K/UL (ref 0–0.11)
IMM GRANULOCYTES NFR BLD AUTO: 0.4 % (ref 0–0.9)
LDH SERPL L TO P-CCNC: 222 U/L (ref 107–266)
LYMPHOCYTES # BLD AUTO: 0.88 K/UL (ref 1–4.8)
LYMPHOCYTES NFR BLD: 8.4 % (ref 22–41)
MCH RBC QN AUTO: 32 PG (ref 27–33)
MCHC RBC AUTO-ENTMCNC: 32 G/DL (ref 33.7–35.3)
MCV RBC AUTO: 100 FL (ref 81.4–97.8)
MONOCYTES # BLD AUTO: 4.58 K/UL (ref 0–0.85)
MONOCYTES NFR BLD AUTO: 43.7 % (ref 0–13.4)
NEUTROPHILS # BLD AUTO: 4.97 K/UL (ref 1.82–7.42)
NEUTROPHILS NFR BLD: 47.4 % (ref 44–72)
NRBC # BLD AUTO: 0.04 K/UL
NRBC BLD-RTO: 0.4 /100 WBC
PLATELET # BLD AUTO: 60 K/UL (ref 164–446)
PMV BLD AUTO: 10.8 FL (ref 9–12.9)
POTASSIUM SERPL-SCNC: 4.2 MMOL/L (ref 3.6–5.5)
PROCALCITONIN SERPL-MCNC: 2.34 NG/ML
PROT SERPL-MCNC: 6 G/DL (ref 6–8.2)
RBC # BLD AUTO: 2.81 M/UL (ref 4.7–6.1)
SIGNIFICANT IND 70042: ABNORMAL
SIGNIFICANT IND 70042: ABNORMAL
SIGNIFICANT IND 70042: NORMAL
SITE SITE: ABNORMAL
SITE SITE: ABNORMAL
SITE SITE: NORMAL
SODIUM SERPL-SCNC: 134 MMOL/L (ref 135–145)
SOURCE SOURCE: ABNORMAL
SOURCE SOURCE: ABNORMAL
SOURCE SOURCE: NORMAL
WBC # BLD AUTO: 10.5 K/UL (ref 4.8–10.8)

## 2020-12-29 PROCEDURE — 700111 HCHG RX REV CODE 636 W/ 250 OVERRIDE (IP): Performed by: STUDENT IN AN ORGANIZED HEALTH CARE EDUCATION/TRAINING PROGRAM

## 2020-12-29 PROCEDURE — 80053 COMPREHEN METABOLIC PANEL: CPT

## 2020-12-29 PROCEDURE — 700102 HCHG RX REV CODE 250 W/ 637 OVERRIDE(OP): Performed by: STUDENT IN AN ORGANIZED HEALTH CARE EDUCATION/TRAINING PROGRAM

## 2020-12-29 PROCEDURE — 97161 PT EVAL LOW COMPLEX 20 MIN: CPT

## 2020-12-29 PROCEDURE — 36415 COLL VENOUS BLD VENIPUNCTURE: CPT

## 2020-12-29 PROCEDURE — 770020 HCHG ROOM/CARE - TELE (206)

## 2020-12-29 PROCEDURE — 99233 SBSQ HOSP IP/OBS HIGH 50: CPT | Mod: GC | Performed by: HOSPITALIST

## 2020-12-29 PROCEDURE — A9270 NON-COVERED ITEM OR SERVICE: HCPCS | Performed by: STUDENT IN AN ORGANIZED HEALTH CARE EDUCATION/TRAINING PROGRAM

## 2020-12-29 PROCEDURE — A9270 NON-COVERED ITEM OR SERVICE: HCPCS | Performed by: HOSPITALIST

## 2020-12-29 PROCEDURE — 87040 BLOOD CULTURE FOR BACTERIA: CPT

## 2020-12-29 PROCEDURE — 700111 HCHG RX REV CODE 636 W/ 250 OVERRIDE (IP): Performed by: HOSPITALIST

## 2020-12-29 PROCEDURE — 700102 HCHG RX REV CODE 250 W/ 637 OVERRIDE(OP): Performed by: HOSPITALIST

## 2020-12-29 PROCEDURE — 83615 LACTATE (LD) (LDH) ENZYME: CPT

## 2020-12-29 PROCEDURE — 85025 COMPLETE CBC W/AUTO DIFF WBC: CPT

## 2020-12-29 PROCEDURE — 700105 HCHG RX REV CODE 258: Performed by: STUDENT IN AN ORGANIZED HEALTH CARE EDUCATION/TRAINING PROGRAM

## 2020-12-29 PROCEDURE — 97166 OT EVAL MOD COMPLEX 45 MIN: CPT

## 2020-12-29 PROCEDURE — 84145 PROCALCITONIN (PCT): CPT

## 2020-12-29 RX ORDER — HEPARIN SODIUM 5000 [USP'U]/ML
5000 INJECTION, SOLUTION INTRAVENOUS; SUBCUTANEOUS EVERY 12 HOURS
Status: DISCONTINUED | OUTPATIENT
Start: 2020-12-29 | End: 2021-01-13 | Stop reason: HOSPADM

## 2020-12-29 RX ORDER — LOSARTAN POTASSIUM 25 MG/1
25 TABLET ORAL
Status: DISCONTINUED | OUTPATIENT
Start: 2020-12-29 | End: 2021-01-13 | Stop reason: HOSPADM

## 2020-12-29 RX ORDER — BUMETANIDE 1 MG/1
1 TABLET ORAL ONCE
Status: COMPLETED | OUTPATIENT
Start: 2020-12-29 | End: 2020-12-29

## 2020-12-29 RX ADMIN — LOSARTAN POTASSIUM 25 MG: 25 TABLET, FILM COATED ORAL at 12:36

## 2020-12-29 RX ADMIN — HEPARIN SODIUM 5000 UNITS: 5000 INJECTION, SOLUTION INTRAVENOUS; SUBCUTANEOUS at 17:10

## 2020-12-29 RX ADMIN — HEPARIN SODIUM 5000 UNITS: 5000 INJECTION, SOLUTION INTRAVENOUS; SUBCUTANEOUS at 04:34

## 2020-12-29 RX ADMIN — ALLOPURINOL 150 MG: 300 TABLET ORAL at 04:34

## 2020-12-29 RX ADMIN — CARVEDILOL 3.12 MG: 3.12 TABLET, FILM COATED ORAL at 17:09

## 2020-12-29 RX ADMIN — ISOSORBIDE MONONITRATE 30 MG: 30 TABLET, EXTENDED RELEASE ORAL at 17:10

## 2020-12-29 RX ADMIN — TAMSULOSIN HYDROCHLORIDE 0.4 MG: 0.4 CAPSULE ORAL at 04:34

## 2020-12-29 RX ADMIN — ACETAMINOPHEN 500 MG: 500 TABLET ORAL at 18:47

## 2020-12-29 RX ADMIN — OXYCODONE HYDROCHLORIDE 10 MG: 10 TABLET ORAL at 08:43

## 2020-12-29 RX ADMIN — OXYCODONE HYDROCHLORIDE 10 MG: 10 TABLET ORAL at 12:36

## 2020-12-29 RX ADMIN — HYDRALAZINE HYDROCHLORIDE 25 MG: 25 TABLET, FILM COATED ORAL at 04:34

## 2020-12-29 RX ADMIN — HYDRALAZINE HYDROCHLORIDE 25 MG: 25 TABLET, FILM COATED ORAL at 17:09

## 2020-12-29 RX ADMIN — DOCUSATE SODIUM 50 MG AND SENNOSIDES 8.6 MG 2 TABLET: 8.6; 5 TABLET, FILM COATED ORAL at 17:09

## 2020-12-29 RX ADMIN — BUMETANIDE 1 MG: 1 TABLET ORAL at 11:15

## 2020-12-29 RX ADMIN — CEFEPIME 2 G: 2 INJECTION, POWDER, FOR SOLUTION INTRAVENOUS at 04:35

## 2020-12-29 RX ADMIN — OXYCODONE HYDROCHLORIDE 10 MG: 10 TABLET ORAL at 17:10

## 2020-12-29 RX ADMIN — DOXYCYCLINE 100 MG: 100 TABLET, FILM COATED ORAL at 04:34

## 2020-12-29 RX ADMIN — AMPICILLIN SODIUM AND SULBACTAM SODIUM 3 G: 2; 1 INJECTION, POWDER, FOR SOLUTION INTRAMUSCULAR; INTRAVENOUS at 14:00

## 2020-12-29 RX ADMIN — OXYCODONE HYDROCHLORIDE 10 MG: 10 TABLET ORAL at 04:34

## 2020-12-29 RX ADMIN — DOCUSATE SODIUM 50 MG AND SENNOSIDES 8.6 MG 2 TABLET: 8.6; 5 TABLET, FILM COATED ORAL at 04:34

## 2020-12-29 RX ADMIN — ISOSORBIDE MONONITRATE 30 MG: 30 TABLET, EXTENDED RELEASE ORAL at 04:34

## 2020-12-29 RX ADMIN — OXYCODONE HYDROCHLORIDE 10 MG: 10 TABLET ORAL at 21:39

## 2020-12-29 RX ADMIN — CARVEDILOL 3.12 MG: 3.12 TABLET, FILM COATED ORAL at 04:34

## 2020-12-29 RX ADMIN — DOXYCYCLINE 100 MG: 100 TABLET, FILM COATED ORAL at 17:10

## 2020-12-29 ASSESSMENT — ENCOUNTER SYMPTOMS
SHORTNESS OF BREATH: 1
NERVOUS/ANXIOUS: 0
CONSTIPATION: 0
DIARRHEA: 0
WHEEZING: 0
DIZZINESS: 0
TREMORS: 0
NECK PAIN: 0
PALPITATIONS: 0
SINUS PAIN: 0
NAUSEA: 0
BACK PAIN: 0
CHILLS: 0
ABDOMINAL PAIN: 0
FEVER: 0
WEIGHT LOSS: 1
HEADACHES: 0
COUGH: 0
DEPRESSION: 0
TINGLING: 0
SORE THROAT: 0
BRUISES/BLEEDS EASILY: 0
WEAKNESS: 0
MEMORY LOSS: 0
VOMITING: 0

## 2020-12-29 ASSESSMENT — COGNITIVE AND FUNCTIONAL STATUS - GENERAL
DRESSING REGULAR UPPER BODY CLOTHING: A LITTLE
MOBILITY SCORE: 11
DAILY ACTIVITIY SCORE: 15
STANDING UP FROM CHAIR USING ARMS: A LITTLE
DRESSING REGULAR LOWER BODY CLOTHING: A LOT
EATING MEALS: A LITTLE
TOILETING: A LOT
MOVING FROM LYING ON BACK TO SITTING ON SIDE OF FLAT BED: UNABLE
MOVING TO AND FROM BED TO CHAIR: UNABLE
SUGGESTED CMS G CODE MODIFIER DAILY ACTIVITY: CK
TURNING FROM BACK TO SIDE WHILE IN FLAT BAD: UNABLE
WALKING IN HOSPITAL ROOM: A LITTLE
PERSONAL GROOMING: A LITTLE
HELP NEEDED FOR BATHING: A LOT
CLIMB 3 TO 5 STEPS WITH RAILING: A LOT
SUGGESTED CMS G CODE MODIFIER MOBILITY: CL

## 2020-12-29 ASSESSMENT — GAIT ASSESSMENTS
DEVIATION: BRADYKINETIC;SHUFFLED GAIT
GAIT LEVEL OF ASSIST: MINIMAL ASSIST
DISTANCE (FEET): 3
ASSISTIVE DEVICE: FRONT WHEEL WALKER

## 2020-12-29 ASSESSMENT — PAIN DESCRIPTION - PAIN TYPE
TYPE: ACUTE PAIN
TYPE: ACUTE PAIN
TYPE: CHRONIC PAIN
TYPE: ACUTE PAIN;CHRONIC PAIN
TYPE: ACUTE PAIN;CHRONIC PAIN
TYPE: CHRONIC PAIN
TYPE: ACUTE PAIN
TYPE: ACUTE PAIN;CHRONIC PAIN

## 2020-12-29 ASSESSMENT — ACTIVITIES OF DAILY LIVING (ADL): TOILETING: INDEPENDENT

## 2020-12-29 NOTE — HEART FAILURE PROGRAM
Patient apparently recently moved from [place] to [place] to be with his son.     He was brought in on 12/27/20 via EMS with c/o SOB and weakness x 3 days. Initial notes state that patient has no known history of heart failure but current notes indicate that discharge information from [facility] demonstrate that this is a piror diagnosis.    Our echocardiogram shows EF of 20%. Patient also has a history of CML; Afib; prostate cx s/p radiation therapy; skin cx; TMVR; PHTN; AICD placement.    Patient has been found here to have monocytosis on peripheral smear. BMB ordered to r/o leukemia and myeloproliferative disorders. He also has been diagnosed with PNA; LE ulcers; JC; and severe TR. Liver enzymes were elevated plan for outpatient GI f/u and pt was advised to quit drinking ETOH.    Pt is a DNR but has no ACP documents on file. He is 85 years old.      Dr. Sanchez's note yesterday indicates that Therapies are pending for placement. In light of this, I've asked the community health workers (formerly hospital schedulers) to arrange f/u for after SNF.      • Tobacco Cessation: per H&P, patient has quit  • Diabetes Dx indicating need for glycemic control and lipid lowering medication, prescribed or physician note stating why not prescribed: not diagnosed  • AC for atrial arrhythmia, prescribed or physician note stating why not prescribed: missing  • SANDRO - I, prescribed or physician note stating why not prescribed: missing  • Evidence based beta blocker (bisoprolol, carvedilol, or Toprol XL), prescribed or physician note stating why not prescribed: carvedilol  • Aldosterone antagonist, prescribed or physician note stating why not prescribed: missing  • Hydralazine dinitrate, prescribed or physician note stating why not prescribed: n/a per facesheet  • Influenza vaccine: 10/11/20  • Pneumococcal vaccine: previous  • Device Screening: n/a    Please see below for measures that must be addressed prior to discharge.     Daily  Nurse: please begin to fill out the HF checklist (pink sheet in hard chart) and use it to guide your daily care.    Discharge Nurse: please ensure completeness of the HF checklist (pink sheet in hard chart) and have it co-signed by the charge RN before the patient leaves the hospital.    Thank you, Mag Cardio RN Navigator [phone]    HF Measures:  1. Documentation of LV systolic function (echo or cath) PTA, during this hospitalization, or plan to assess post discharge or reason for not assessing documented  2. Documentation of fluid intake and urine output every nursing shift  3. 2 hour post diuretic assessment documented 2 hours after diuretic given  4. HF Patient Education using the Living Well With Heart Failure Booklet and Symptom Tracker documented every nursing shift  5. Nutrition consult for diet education  6. Daily weights (one weight documented every 24 hours) on a standing scale unless standing is contraindicated in which case bed scale can be used - have patient write weight on symptom tracker  7. For LVEF less than or equal to 40%, ACE-I, ARNI or ARB prescribed at discharge   8. For LVEF less than or equal to 40%, an Evidence Based Beta Blocker (bisoprolol, carvedilol, toprol xl) must be prescribed at discharge  9. For LVEF less than or equal to 35% aldosterone blockade prescribed at discharge  10. The combination of hydralazine and isosorbide dinitrate is recommended to reduce morbidity and mortality for patients self-described  Americans with NYHA class III-IV HFrEF (EF 40% or less), receiving optimal therapy with ACE inhibitors and beta blockers, unless contraindicated (Class I, DONNY: A).  11. If a HF patient is diabetic or is newly diagnosed with DM: prescribed diabetes treatment at discharge in the form of glycemic control (diet or anti-hyperglycemic medication) or f/u appointment for diabetes management scheduled at discharge.  12. If a HF patient has diabetes: prescribed lipid  lowering medication at discharge  13. Documented smoking cessation advice or counseling  14. If a HF patient has a-fib: anticoagulation is prescribed upon discharge or contraindication is documented  15. Screening for and administering immunizations as long as no contraindications: Pneumonia (regardless of age) and Influenza  16. Written discharge instructions include:  ? Daily weights  ? Record weight on tracker  ? Bring tracker to appointments  ? Call MD for weight gain of 3lb /day or 5lb/week  ? HF medication teaching  ? Low sodium diet  ? Follow up appointment within seven calendar days of d/c must include: date, time and location  ? Activity  ? Worsening symptoms    What if any of the above HF measures are contraindicated?  ? Request that the discharging provider document the medication/intervention and the contraindication specifically in a progress note  ? For example: “no CHF meds due to hypotension” is not enough. It needs to say: “No ACE-I, ARNI, ARB due to hypotension”; “No Beta Blockade due to bradycardia”…

## 2020-12-29 NOTE — PROGRESS NOTES
Assumed care at 1900, bedside report received from Inna KEMP. Pt. Is paced with underlying afib on the monitor. Initial assessment completed, orders reviewed, call light within reach, bed alarm is in use, and hourly rounding in place. POC addressed with patient, no additional questions at this time.

## 2020-12-29 NOTE — THERAPY
Physical Therapy   Initial Evaluation     Patient Name: Stephen Ortiz  Age:  85 y.o., Sex:  male  Medical Record #: [id number]  Today's Date: 12/29/2020     Precautions: Fall Risk    Assessment  Patient is an 85 y.o. male admitted with acute respiratory failure with hx of EF 20% and AICD. Per chart, pt recently moved to [place] from [place] to be with his son. Pt seen or PT evaluation at this time. Pt independent PTA and appears below his functional baseline at this time with incr effort required for all mobility. Pt needing max A for supine > sit, then able to move from EOB to chair with FWW and min A. Pt will benefit from continued acute PT to progress mobility and postacute placement to maximize safety and functional independence prior to return home. Encourage up to chair with nursing. Will follow.     Plan  Recommend Physical Therapy 3 times per week until therapy goals are met for the following treatments:  Bed Mobility, Gait Training, Neuro Re-Education / Balance, Self Care/Home Evaluation, Therapeutic Activities and Therapeutic Exercises  DC Equipment Recommendations: Unable to determine at this time  Discharge Recommendations: Recommend post-acute placement for additional physical therapy services prior to discharge home       12/29/20 0969   Prior Living Situation   Prior Services None   Housing / Facility Hotels   Steps Into Home 0   Steps In Home 0   Elevator Yes   Bathroom Set up Bathtub / Shower Combination   Equipment Owned 4-Wheel Walker   Lives with -  Adult Children   Comments pt reports lives with his son and has been staying in hotels. chart indicates pt recently moved from [place] to be with his son. pt does not mention this.    Prior Level of Functional Mobility   Bed Mobility Independent   Transfer Status Independent   Ambulation Independent   Distance Ambulation (Feet) community distances   Assistive Devices Used 4-Wheel Walker   Balance Assessment   Sitting Balance (Static) Fair +    Sitting Balance (Dynamic) Fair +   Standing Balance (Static) Fair   Standing Balance (Dynamic) Fair -   Weight Shift Sitting Fair   Weight Shift Standing Fair   Comments standing with FWW   Gait Analysis   Gait Level Of Assist Minimal Assist   Assistive Device Front Wheel Walker   Distance (Feet) 3   Deviation Bradykinetic;Shuffled Gait   Weight Bearing Status no restrictions   Comments noticeable incr in effort, no LOB when standing with walker and able to move over to chair   Bed Mobility    Supine to Sit Maximal Assist   Scooting Maximal Assist   Comments decr initiation and assist at LEs and trunk   Functional Mobility   Sit to Stand Minimal Assist   Bed, Chair, WC Transfer Minimal Assist   Comments with FWW   Short Term Goals    Short Term Goal # 1 pt will perform supine <> sit without bed features with SPV in 6 visits to be able to get in/out of bed at home   Short Term Goal # 2 pt will perform all functional xfrs with SPV in 6 visits for improved independence   Short Term Goal # 3 pt will ambulate 150ft with FWW and SPV in 6 visits to access home environment

## 2020-12-29 NOTE — PROGRESS NOTES
Expected Date Of Service: 08/01/2019 Received report from night shift RNJudy. Assumed care of pt. Pt reporting pain at this time. Updated pt on plan of care. Pt resting upright in bed. Educated on use of call light. Hourly rounding and continuous monitoring in place.

## 2020-12-29 NOTE — PROGRESS NOTES
85 Male hx CML - Admitted with SOB. Known EF 20%. Found to have leukocytosis - monocytic blasts on peripheral smear. But now found to have Gram negative bacteremia, improving leukocytosis with management.     Given alternative diagnosis and active infection with unknown source of gram negative bacteremia - I have advised the primary team to consider bone marrow biopsy non emergently (active infection patient would not be a candidate for treatment). Recommend stabilization of acute co morbid condition with severely depressed EF prior to proceeding with bone marrow bx with sedation and r/o alternative diagnosis.     Tru Tapia M.D.  12/29/20  12:34 PM

## 2020-12-29 NOTE — DISCHARGE PLANNING
LSW met with pt and attempted to complete CTT assessment and discuss dc planning. Pt needing an x-ray. LSW to attempt at another time.

## 2020-12-29 NOTE — PROGRESS NOTES
Daily Progress Note:     Date of Service: 12/29/2020  Primary Team: [facility] Team   Attending: NITA Wheat M.D.   Senior Resident: Dr. Haji  Intern: Dr. Sanchez  Contact:  [phone]    Chief Complaint:   Shortness of breath, weakness    Subjective:  Patient reports that he feels well today.  Reports low shortness of breath, feels less weak.  Alert and oriented x4 today.  No chest pain, nausea, vomiting, fever, chills.     WBC 10.5 this am. Continued evlevated monocytes (43.70%) on CBC on am labs. Blood Cultures x2+ for E. Coli pan-sensitive, switched to IV Unasyn.      Discussed during IDT rounds. PT/OT recommending SNF. Possible complication is patient's unstable living situation - can't go back with ex-wife.    Dr. Wheat spoke with Dr. Tapia after his note, will do bone marrow biopsy tomorrow. NPO after midnight.    Consultants/Specialty:  N/A  Review of Systems:    Review of Systems   Constitutional: Positive for malaise/fatigue and weight loss. Negative for chills and fever.   HENT: Negative for congestion, ear discharge, ear pain, sinus pain and sore throat.    Respiratory: Positive for shortness of breath. Negative for cough and wheezing.    Cardiovascular: Negative for chest pain, palpitations and leg swelling.   Gastrointestinal: Negative for abdominal pain, constipation, diarrhea, nausea and vomiting.   Genitourinary: Negative for dysuria, frequency, hematuria and urgency.   Musculoskeletal: Negative for back pain, joint pain and neck pain.   Skin: Negative for itching and rash.   Neurological: Negative for dizziness, tingling, tremors, weakness and headaches.   Endo/Heme/Allergies: Does not bruise/bleed easily.   Psychiatric/Behavioral: Negative for depression and memory loss. The patient is not nervous/anxious.        Objective Data:   Physical Exam:   Vitals:   Temp:  [36.1 °C (96.9 °F)-36.7 °C (98 °F)] 36.1 °C (96.9 °F)  Pulse:  [64-89] 66  Resp:  [16-18] 18  BP: ()/(61-74)  104/65  SpO2:  [90 %-97 %] 95 %     Physical Exam  Constitutional:       General: He is not in acute distress.     Appearance: He is not diaphoretic.   HENT:      Head: Normocephalic and atraumatic.      Right Ear: External ear normal.      Left Ear: External ear normal.      Nose: Nose normal.      Mouth/Throat:      Pharynx: No oropharyngeal exudate or posterior oropharyngeal erythema.   Eyes:      Extraocular Movements: Extraocular movements intact.      Pupils: Pupils are equal, round, and reactive to light.   Neck:      Musculoskeletal: Normal range of motion. No neck rigidity or muscular tenderness.   Cardiovascular:      Rate and Rhythm: Normal rate.      Pulses: Normal pulses.      Heart sounds: Murmur present. No friction rub. No gallop.       Comments: Paced rhythm  Pulmonary:      Effort: Pulmonary effort is normal. No respiratory distress.      Breath sounds: Normal breath sounds. No stridor.   Abdominal:      General: There is no distension.      Palpations: There is no mass.      Tenderness: There is no abdominal tenderness.   Musculoskeletal:      Right lower leg: Edema present.      Left lower leg: Edema present.      Comments: 1+ edema in legs, chronic venous stasis changes present   Skin:     Capillary Refill: Capillary refill takes less than 2 seconds.      Findings: No bruising, erythema, lesion or rash.   Neurological:      General: No focal deficit present.      Mental Status: He is alert and oriented to person, place, and time.      Motor: No weakness.   Psychiatric:         Mood and Affect: Mood normal.         Behavior: Behavior normal.         Thought Content: Thought content normal.         Judgment: Judgment normal.           Labs:   Recent Results (from the past 24 hour(s))   PROCALCITONIN    Collection Time: 12/29/20  1:26 AM   Result Value Ref Range    Procalcitonin 2.34 (H) <0.25 ng/mL   CBC WITH DIFFERENTIAL    Collection Time: 12/29/20  1:26 AM   Result Value Ref Range    WBC 10.5  4.8 - 10.8 K/uL    RBC 2.81 (L) 4.70 - 6.10 M/uL    Hemoglobin 9.0 (L) 14.0 - 18.0 g/dL    Hematocrit 28.1 (L) 42.0 - 52.0 %    .0 (H) 81.4 - 97.8 fL    MCH 32.0 27.0 - 33.0 pg    MCHC 32.0 (L) 33.7 - 35.3 g/dL    RDW 74.7 (H) 35.9 - 50.0 fL    Platelet Count 60 (L) 164 - 446 K/uL    MPV 10.8 9.0 - 12.9 fL    Neutrophils-Polys 47.40 44.00 - 72.00 %    Lymphocytes 8.40 (L) 22.00 - 41.00 %    Monocytes 43.70 (H) 0.00 - 13.40 %    Eosinophils 0.00 0.00 - 6.90 %    Basophils 0.10 0.00 - 1.80 %    Immature Granulocytes 0.40 0.00 - 0.90 %    Nucleated RBC 0.40 /100 WBC    Neutrophils (Absolute) 4.97 1.82 - 7.42 K/uL    Lymphs (Absolute) 0.88 (L) 1.00 - 4.80 K/uL    Monos (Absolute) 4.58 (H) 0.00 - 0.85 K/uL    Eos (Absolute) 0.00 0.00 - 0.51 K/uL    Baso (Absolute) 0.01 0.00 - 0.12 K/uL    Immature Granulocytes (abs) 0.04 0.00 - 0.11 K/uL    NRBC (Absolute) 0.04 K/uL   Comp Metabolic Panel    Collection Time: 12/29/20  1:26 AM   Result Value Ref Range    Sodium 134 (L) 135 - 145 mmol/L    Potassium 4.2 3.6 - 5.5 mmol/L    Chloride 105 96 - 112 mmol/L    Co2 17 (L) 20 - 33 mmol/L    Anion Gap 12.0 7.0 - 16.0    Glucose 139 (H) 65 - 99 mg/dL    Bun 52 (H) 8 - 22 mg/dL    Creatinine 2.50 (H) 0.50 - 1.40 mg/dL    Calcium 8.5 8.5 - 10.5 mg/dL    AST(SGOT) 68 (H) 12 - 45 U/L    ALT(SGPT) 132 (H) 2 - 50 U/L    Alkaline Phosphatase 87 30 - 99 U/L    Total Bilirubin 1.5 0.1 - 1.5 mg/dL    Albumin 3.6 3.2 - 4.9 g/dL    Total Protein 6.0 6.0 - 8.2 g/dL    Globulin 2.4 1.9 - 3.5 g/dL    A-G Ratio 1.5 g/dL   LDH    Collection Time: 12/29/20  1:26 AM   Result Value Ref Range    LDH Total 222 107 - 266 U/L   ESTIMATED GFR    Collection Time: 12/29/20  1:26 AM   Result Value Ref Range    GFR If African American 30 (A) >60 mL/min/1.73 m 2    GFR If Non African American 25 (A) >60 mL/min/1.73 m 2     Imaging:   DX-CHEST-PORTABLE (1 VIEW)   Final Result      No significant change from prior exam.      DX-TOE(S) 2+ RIGHT   Final  Result      1.  Hallux valgus with bunion formation and severe degenerative change of RIGHT 1st metatarsophalangeal joint.   2.  No fracture or gross focal bony destruction.      US-RUQ   Final Result      Cholelithiasis with borderline gallbladder wall thickening which may related to right heart failure (versus cholecystitis) as there is evidence of pleural fluid and ascites with bidirectional flow in the main portal vein.      Echogenic right kidney with renal cortical thinning consistent with medical renal disease.      EC-ECHOCARDIOGRAM COMPLETE W/O CONT   Final Result      NM-LUNG VENT/PERF IMAGING   Final Result      Low probability VQ scan.      US-EXTREMITY VENOUS LOWER BILAT   Final Result      DX-CHEST-PORTABLE (1 VIEW)   Final Result      1.  Cardiomegaly.      2.  Bibasilar atelectasis.        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 fib status post AICD placement 9921-1726, pulmonary hypertension, severe tricuspid regurgitation, mitral valve regurgitation status post intervention October 2020, CML, prostate cancer status post resection, skin cancer who presents with shortness of breath and weakness for 3 days, edema in legs. Peripheral smear showed monocytosis, bone marrow biopsy ordered to r/o leukemia, myeloproliferative disorders.    * Acute respiratory failure with hypoxia (HCC)- (present on admission)  Assessment & Plan  Presented 12/27 for fatigue, unresponsiveness at home  86% on room air on presentation, was on HFNC, now on NC 6L  DDx: Pneumonia/right foot ulcer, HFrEF, DVT/PE  US LE without DVT, V/Q scan negative  RUQ ultrasound shows cholelithiasis with borderline gallbladder wall thickening, may related to right heart failure (versus cholecystitis), echogenic right kidney with renal cortical thinning  Echocardiogram 12/27 -LVEF 25%, dilated right ventricle, severe tricuspid regurgitation  Borderline  "hypotensive in ER, 1 L bolus was given in ER, hold fluids and diuresis   Has hx of CML, easy bleeding, low platelets, hold anticoagulation for now as pt is intermediate risk for PE  PLAN:  -Continue to monitor, improving without diuresis  -Likely not due to CHF as echocardiogram is largely unchanged from echo on 11/30/2020  -Likely secondary to infectious source either lung or toe    Monocytosis- (present on admission)  Assessment & Plan  Discussed peripheral smear with Dr. Cuello  - marked monocytosis with erythroblasts/pRBCs, bone marrow biopsy recommended to exclude leukemia, myeloproliferative disorders  - 43.70% monocytes on 12/29 AM CBC  PLAN:  - Dr. Wheat spoke with Dr. Tapia after his note, will do bone marrow biopsy tomorrow 12/29. NPO after midnight.    Leukocytosis- (present on admission)  Assessment & Plan  WBC 42.0 in setting of CML?, dropped to 31.8 next day, now 10.5, blast crisis active versus sepsis versus leukocytosis  Patient hypoxic, other possible source of infection includes right toe chronic ulcer or leg cellulitis  Chest X-ray showed possible opacities in right lung  Blood Cultures x2+ for E coli pan sensitive  Plan:  Will treat with Unasyn and doxycycline  Repeat AP chest x-ray when patient can stand    Acute on chronic congestive heart failure (HCC)- (present on admission)  Assessment & Plan  Echocardiogram 12/27 -LVEF 25%, dilated right ventricle, severe tricuspid regurgitation  These meds held due to hypotension: ACE, aldosterone antagonist, hydralazne  Anticoagulation held due to possible malignancy  Reports taking bumex per [facility] discharge summary, seen in \"care everywhere\" tab  Plan:  Carvedilol 3.125 mg BID  Imdur 30 mg BID  Hydralazine 25 mg BID  Losartan 25 mg  Hold spironolactone for hypotension, consider starting later  PCV13 ordered per core measures    Ulcer of great toe, right, with unspecified severity (HCC)- (present on admission)  Assessment & Plan  Ulcer right great toe " noted, does not appear to penetrate muscle or to bone  Chronic for several months at least per patient  Right great toe x-ray no conistent to rule out osteomyelitis:  - Hallux valgus with bunion formation and severe degenerative change of RIGHT 1st metatarsophalangeal joint, not in same location as ulcer - no signs of osteomyelitis  Plan:  -Doxycycline ordered for gram-positive coverage and MRSA coverage  - clean out wound and bandage    JC (acute kidney injury) (HCC)- (present on admission)  Assessment & Plan  Cr 2.2 in 11/2020 at [facility]  Elevated Cr 2.8 here in ER  Possible prerenal due to hypovalemia  Given IV fluid bolus in ER, will hold IV fluids and diuresis due due HfREF  Plan:  Monitor  Consider nephrology consult if not improving    Tricuspid regurgitation- (present on admission)  Assessment & Plan  Echo 12/27 showed severe tricuspid regurgitation  Recommend to to establish with cardiology here in [place]  Discussed with cardiology, tricuspid clips are not done in [place] and are referred to [facility] on outpatient basis    Mitral regurgitation- (present on admission)  Assessment & Plan  History of, s/p mitral valve clip in 10/2020  Echocardiogram 12/27 showed mild mitral stenosis    Chronic atrial fibrillation (HCC)- (present on admission)  Assessment & Plan  Not in rapid ventricular response  Does have AICD for heart failure in place  Continue home medications, not on anticoagulation per records review, likely due to hx easy bleeding and CML  Continue home carvedilol, titrate up dose as appropriate    BPH (benign prostatic hyperplasia)- (present on admission)  Assessment & Plan  Continue home tamsulosin    Elevated liver enzymes- (present on admission)  Assessment & Plan  AST/ALT normal in 11/2020 at [facility], t bili was 2.4; AST//218, T bili 3.6 admission  History of heavy drinking per patient, none now per his report -though patient reports 12 to 24 pack use appear  Hepatitis panel negative  US liver  ordered - cholelithiasis with borderline gallbladder wall thickening, right heart failure (versus cholecystitis) , echogenic right kidney with renal cortical thinning   PLAN:  Outpatient GI follow-up  Advised to quit drinking alcohol    * Code Status: DNR/DNI  * Diet: Cardiac/Renal  * Lines/Tubes/Drains: PIV  * IVF: none indicated  * Prophylaxis:        -- DVT: heparin        -- GI: omeprazole  * PCP:   * Social / DC planning: likely to SNF [facility] once acute illness(es) have been addressed  * Dispo: Possibly SNF, social work working on

## 2020-12-30 LAB
ANION GAP SERPL CALC-SCNC: 12 MMOL/L (ref 7–16)
BASOPHILS # BLD AUTO: 0 % (ref 0–1.8)
BASOPHILS # BLD: 0 K/UL (ref 0–0.12)
BUN SERPL-MCNC: 48 MG/DL (ref 8–22)
CALCIUM SERPL-MCNC: 8.2 MG/DL (ref 8.5–10.5)
CHLORIDE SERPL-SCNC: 106 MMOL/L (ref 96–112)
CO2 SERPL-SCNC: 18 MMOL/L (ref 20–33)
CREAT SERPL-MCNC: 2.31 MG/DL (ref 0.5–1.4)
EOSINOPHIL # BLD AUTO: 0 K/UL (ref 0–0.51)
EOSINOPHIL NFR BLD: 0 % (ref 0–6.9)
ERYTHROCYTE [DISTWIDTH] IN BLOOD BY AUTOMATED COUNT: 76.1 FL (ref 35.9–50)
GLUCOSE SERPL-MCNC: 102 MG/DL (ref 65–99)
HCT VFR BLD AUTO: 28 % (ref 42–52)
HGB BLD-MCNC: 8.7 G/DL (ref 14–18)
IMM GRANULOCYTES # BLD AUTO: 0.03 K/UL (ref 0–0.11)
IMM GRANULOCYTES NFR BLD AUTO: 0.4 % (ref 0–0.9)
LYMPHOCYTES # BLD AUTO: 0.62 K/UL (ref 1–4.8)
LYMPHOCYTES NFR BLD: 8.4 % (ref 22–41)
MCH RBC QN AUTO: 31 PG (ref 27–33)
MCHC RBC AUTO-ENTMCNC: 31.1 G/DL (ref 33.7–35.3)
MCV RBC AUTO: 99.6 FL (ref 81.4–97.8)
MONOCYTES # BLD AUTO: 2.27 K/UL (ref 0–0.85)
MONOCYTES NFR BLD AUTO: 30.6 % (ref 0–13.4)
NEUTROPHILS # BLD AUTO: 4.49 K/UL (ref 1.82–7.42)
NEUTROPHILS NFR BLD: 60.6 % (ref 44–72)
NRBC # BLD AUTO: 0.04 K/UL
NRBC BLD-RTO: 0.5 /100 WBC
PATHOLOGY CONSULT NOTE: NORMAL
PLATELET # BLD AUTO: 60 K/UL (ref 164–446)
PMV BLD AUTO: 10.6 FL (ref 9–12.9)
POTASSIUM SERPL-SCNC: 3.6 MMOL/L (ref 3.6–5.5)
RBC # BLD AUTO: 2.81 M/UL (ref 4.7–6.1)
SODIUM SERPL-SCNC: 136 MMOL/L (ref 135–145)
WBC # BLD AUTO: 7.4 K/UL (ref 4.8–10.8)

## 2020-12-30 PROCEDURE — 80048 BASIC METABOLIC PNL TOTAL CA: CPT

## 2020-12-30 PROCEDURE — 700111 HCHG RX REV CODE 636 W/ 250 OVERRIDE (IP): Performed by: STUDENT IN AN ORGANIZED HEALTH CARE EDUCATION/TRAINING PROGRAM

## 2020-12-30 PROCEDURE — 700102 HCHG RX REV CODE 250 W/ 637 OVERRIDE(OP): Performed by: STUDENT IN AN ORGANIZED HEALTH CARE EDUCATION/TRAINING PROGRAM

## 2020-12-30 PROCEDURE — 88342 IMHCHEM/IMCYTCHM 1ST ANTB: CPT

## 2020-12-30 PROCEDURE — 160038 HCHG SURGERY MINUTES - EA ADDL 1 MIN LEVEL 2: Performed by: HOSPITALIST

## 2020-12-30 PROCEDURE — 99233 SBSQ HOSP IP/OBS HIGH 50: CPT | Mod: GC,25 | Performed by: HOSPITALIST

## 2020-12-30 PROCEDURE — 700111 HCHG RX REV CODE 636 W/ 250 OVERRIDE (IP): Performed by: HOSPITALIST

## 2020-12-30 PROCEDURE — 99356 PR PROLONGED SVC I/P OR OBS SETTING 1ST HOUR: CPT | Mod: GC | Performed by: HOSPITALIST

## 2020-12-30 PROCEDURE — A9270 NON-COVERED ITEM OR SERVICE: HCPCS | Performed by: STUDENT IN AN ORGANIZED HEALTH CARE EDUCATION/TRAINING PROGRAM

## 2020-12-30 PROCEDURE — 38222 DX BONE MARROW BX & ASPIR: CPT | Performed by: HOSPITALIST

## 2020-12-30 PROCEDURE — 700102 HCHG RX REV CODE 250 W/ 637 OVERRIDE(OP): Performed by: HOSPITALIST

## 2020-12-30 PROCEDURE — 88360 TUMOR IMMUNOHISTOCHEM/MANUAL: CPT | Mod: 91

## 2020-12-30 PROCEDURE — 700105 HCHG RX REV CODE 258: Performed by: STUDENT IN AN ORGANIZED HEALTH CARE EDUCATION/TRAINING PROGRAM

## 2020-12-30 PROCEDURE — 160027 HCHG SURGERY MINUTES - 1ST 30 MINS LEVEL 2: Performed by: HOSPITALIST

## 2020-12-30 PROCEDURE — 36415 COLL VENOUS BLD VENIPUNCTURE: CPT

## 2020-12-30 PROCEDURE — A9270 NON-COVERED ITEM OR SERVICE: HCPCS | Performed by: HOSPITALIST

## 2020-12-30 PROCEDURE — 88341 IMHCHEM/IMCYTCHM EA ADD ANTB: CPT

## 2020-12-30 PROCEDURE — 94760 N-INVAS EAR/PLS OXIMETRY 1: CPT

## 2020-12-30 PROCEDURE — 07DR3ZX EXTRACTION OF ILIAC BONE MARROW, PERCUTANEOUS APPROACH, DIAGNOSTIC: ICD-10-PCS | Performed by: HOSPITALIST

## 2020-12-30 PROCEDURE — 160046 HCHG PACU - 1ST 60 MINS PHASE II: Performed by: HOSPITALIST

## 2020-12-30 PROCEDURE — 160048 HCHG OR STATISTICAL LEVEL 1-5: Performed by: HOSPITALIST

## 2020-12-30 PROCEDURE — 85025 COMPLETE CBC W/AUTO DIFF WBC: CPT

## 2020-12-30 PROCEDURE — 88305 TISSUE EXAM BY PATHOLOGIST: CPT

## 2020-12-30 PROCEDURE — 770020 HCHG ROOM/CARE - TELE (206)

## 2020-12-30 PROCEDURE — 160025 RECOVERY II MINUTES (STATS): Performed by: HOSPITALIST

## 2020-12-30 PROCEDURE — 88311 DECALCIFY TISSUE: CPT

## 2020-12-30 PROCEDURE — 88313 SPECIAL STAINS GROUP 2: CPT

## 2020-12-30 RX ORDER — ISOSORBIDE MONONITRATE 30 MG/1
30 TABLET, EXTENDED RELEASE ORAL EVERY MORNING
Status: DISCONTINUED | OUTPATIENT
Start: 2020-12-31 | End: 2021-01-13 | Stop reason: HOSPADM

## 2020-12-30 RX ORDER — BISACODYL 10 MG
10 SUPPOSITORY, RECTAL RECTAL
Status: DISCONTINUED | OUTPATIENT
Start: 2020-12-30 | End: 2021-01-13 | Stop reason: HOSPADM

## 2020-12-30 RX ORDER — POLYETHYLENE GLYCOL 3350 17 G/17G
1 POWDER, FOR SOLUTION ORAL DAILY
Status: DISCONTINUED | OUTPATIENT
Start: 2020-12-30 | End: 2021-01-13 | Stop reason: HOSPADM

## 2020-12-30 RX ORDER — AMOXICILLIN 250 MG
2 CAPSULE ORAL 2 TIMES DAILY
Status: DISCONTINUED | OUTPATIENT
Start: 2020-12-30 | End: 2021-01-13 | Stop reason: HOSPADM

## 2020-12-30 RX ORDER — POTASSIUM CHLORIDE 20 MEQ/1
40 TABLET, EXTENDED RELEASE ORAL ONCE
Status: COMPLETED | OUTPATIENT
Start: 2020-12-30 | End: 2020-12-30

## 2020-12-30 RX ADMIN — CARVEDILOL 3.12 MG: 3.12 TABLET, FILM COATED ORAL at 04:42

## 2020-12-30 RX ADMIN — OXYCODONE HYDROCHLORIDE 10 MG: 10 TABLET ORAL at 12:50

## 2020-12-30 RX ADMIN — DOCUSATE SODIUM 50 MG AND SENNOSIDES 8.6 MG 2 TABLET: 8.6; 5 TABLET, FILM COATED ORAL at 04:44

## 2020-12-30 RX ADMIN — DOXYCYCLINE 100 MG: 100 TABLET, FILM COATED ORAL at 17:53

## 2020-12-30 RX ADMIN — TAMSULOSIN HYDROCHLORIDE 0.4 MG: 0.4 CAPSULE ORAL at 04:42

## 2020-12-30 RX ADMIN — POTASSIUM CHLORIDE 40 MEQ: 1500 TABLET, EXTENDED RELEASE ORAL at 07:59

## 2020-12-30 RX ADMIN — DOCUSATE SODIUM 50 MG AND SENNOSIDES 8.6 MG 2 TABLET: 8.6; 5 TABLET, FILM COATED ORAL at 17:52

## 2020-12-30 RX ADMIN — HEPARIN SODIUM 5000 UNITS: 5000 INJECTION, SOLUTION INTRAVENOUS; SUBCUTANEOUS at 04:46

## 2020-12-30 RX ADMIN — ISOSORBIDE MONONITRATE 30 MG: 30 TABLET, EXTENDED RELEASE ORAL at 04:42

## 2020-12-30 RX ADMIN — LOSARTAN POTASSIUM 25 MG: 25 TABLET, FILM COATED ORAL at 04:43

## 2020-12-30 RX ADMIN — DOXYCYCLINE 100 MG: 100 TABLET, FILM COATED ORAL at 04:44

## 2020-12-30 RX ADMIN — OXYCODONE HYDROCHLORIDE 10 MG: 10 TABLET ORAL at 01:41

## 2020-12-30 RX ADMIN — OXYCODONE HYDROCHLORIDE 10 MG: 10 TABLET ORAL at 17:53

## 2020-12-30 RX ADMIN — HYDRALAZINE HYDROCHLORIDE 25 MG: 25 TABLET, FILM COATED ORAL at 04:44

## 2020-12-30 RX ADMIN — OXYCODONE HYDROCHLORIDE 10 MG: 10 TABLET ORAL at 08:01

## 2020-12-30 RX ADMIN — CARVEDILOL 3.12 MG: 3.12 TABLET, FILM COATED ORAL at 17:53

## 2020-12-30 RX ADMIN — AMPICILLIN SODIUM AND SULBACTAM SODIUM 3 G: 2; 1 INJECTION, POWDER, FOR SOLUTION INTRAMUSCULAR; INTRAVENOUS at 04:46

## 2020-12-30 RX ADMIN — HEPARIN SODIUM 5000 UNITS: 5000 INJECTION, SOLUTION INTRAVENOUS; SUBCUTANEOUS at 17:53

## 2020-12-30 RX ADMIN — CEFTRIAXONE SODIUM 2 G: 2 INJECTION, POWDER, FOR SOLUTION INTRAMUSCULAR; INTRAVENOUS at 17:46

## 2020-12-30 RX ADMIN — ALLOPURINOL 150 MG: 300 TABLET ORAL at 04:42

## 2020-12-30 ASSESSMENT — ENCOUNTER SYMPTOMS
PALPITATIONS: 0
DIZZINESS: 0
WHEEZING: 0
DEPRESSION: 0
CONSTIPATION: 0
BACK PAIN: 0
BRUISES/BLEEDS EASILY: 0
WEIGHT LOSS: 1
NECK PAIN: 0
NAUSEA: 0
FEVER: 0
CHILLS: 0
COUGH: 0
SINUS PAIN: 0
NERVOUS/ANXIOUS: 0
SHORTNESS OF BREATH: 1
TINGLING: 0
ABDOMINAL PAIN: 0
MEMORY LOSS: 0
WEAKNESS: 0
HEADACHES: 0
DIARRHEA: 0
VOMITING: 0
SORE THROAT: 0
TREMORS: 0

## 2020-12-30 ASSESSMENT — PAIN DESCRIPTION - PAIN TYPE
TYPE: ACUTE PAIN;CHRONIC PAIN
TYPE: ACUTE PAIN;CHRONIC PAIN
TYPE: CHRONIC PAIN
TYPE: ACUTE PAIN
TYPE: CHRONIC PAIN
TYPE: ACUTE PAIN

## 2020-12-30 ASSESSMENT — FIBROSIS 4 INDEX: FIB4 SCORE: 8.19

## 2020-12-30 NOTE — PROCEDURES
Bone Marrow Biopsy/Aspiration    Date/Time: 12/30/2020 2:44 PM  Performed by: Aaron Fairbanks D.O.  Authorized by: Aaron Fairbanks D.O.     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    Position:  Left lateral decubitus    Buttock laterality:  Right    Local anesthetic:  1% Lidocaine    Subcutaneous volume:  1 mL    Periosteum anesthetic volume:  4 mL    Preparation: Patient was prepped and draped in usual sterile fashion    Sedation:     Patient Sedated: No    Procedure details:     Aspirate obtained:  5 mL followed by 5 mL    Biopsy performed:  2 cores    Number of attempts:  4+    Estimated blood loss (mL):  1  Post-procedure:     Puncture site:  Adhesive bandage applied    Patient tolerance of procedure:  Tolerated well, no immediate complications  Comments:      4 attempts at core however minimal obtained despite being in the bone on all 4 attempts

## 2020-12-30 NOTE — DISCHARGE PLANNING
LSW met with pt and attempted to complete CTT assessment and discuss dc planning. Pt having bone marrow biopsy. LSW to attempt at another time.

## 2020-12-30 NOTE — PROGRESS NOTES
Received report from night shift RNJoelle. Assumed care of pt. Pt sleeping at this time. Updated pt whiteboard. Pt resting comfortably in bed. Fall precautions, hourly rounding and continuous monitoring in place.

## 2020-12-30 NOTE — CARE PLAN
Problem: Safety  Goal: Will remain free from falls  Outcome: PROGRESSING AS EXPECTED  Intervention: Implement fall precautions  Flowsheets  Taken 12/29/2020 0800 by Mag Cha R.N.  Environmental Precautions:   Treaded Slipper Socks on Patient   Personal Belongings, Wastebasket, Call Bell etc. in Easy Reach   Mobility Assessed & Appropriate Sign Placed  Taken 12/28/2020 0933 by Inna Marie R.N.  Bed Alarm: Yes - Alarm On  Bedrails: Bedrails Closest to Bathroom Down  Chair/Bed Strip Alarm: Yes - Alarm On     Problem: Skin Integrity  Goal: Risk for impaired skin integrity will decrease  Outcome: PROGRESSING AS EXPECTED  Intervention: Implement precautions to protect skin integrity in collaboration with the interdisciplinary team  Flowsheets  Taken 12/29/2020 1600 by Inna Marie R.N.  Patient Turns / Repositioning: Sitting Up in Chair  Taken 12/29/2020 0800 by Mag Cha R.N.  Skin Preventative Measures: Pillows in Use for Support / Positioning  Bed Types: Pressure Redistribution Mattress (Atmosair)  Friction Interventions: Draw Sheet / Pad Used for Repositioning  Moisturizers/Barriers: Barrier Cream  Patient is Receiving Nutrition: Nothing by Mouth  Vitamin Therapy in Use: No  Activity: Bed  Assistance / Tolerance for Turning/Repositioning: Assistance of Two or More  Taken 12/29/2020 0800 by Inna Marie R.N.  PT / OT Involved in Care:   Physical Therapy Involved   Occupational Therapy Involved

## 2020-12-30 NOTE — PROGRESS NOTES
Daily Progress Note:     Date of Service: 12/30/2020  Primary Team: [facility] Team   Attending: NITA Wheat M.D.   Senior Resident: Dr. Haji  Intern: Dr. Sanchez  Contact:  [phone]    Chief Complaint:   Shortness of breath, weakness    Subjective:  Patient reports that he feels well today. Alert and oriented x4 today.  No chest pain, nausea, vomiting, fever, chills.     WBC 7.4 this am. Continued evlevated monocytes (30.60%) on CBC on am labs. Blood Cultures x2+ for E. Coli pan-sensitive, switched to IV cetriaxone.      Discussed during IDT rounds. PT/OT recommending SNF.    Patient went to endoscopy suite this afternoon for bone marrow biopsy.    Consultants/Specialty:  N/A  Review of Systems:    Review of Systems   Constitutional: Positive for malaise/fatigue and weight loss. Negative for chills and fever.   HENT: Negative for congestion, ear discharge, ear pain, sinus pain and sore throat.    Respiratory: Positive for shortness of breath. Negative for cough and wheezing.    Cardiovascular: Negative for chest pain, palpitations and leg swelling.   Gastrointestinal: Negative for abdominal pain, constipation, diarrhea, nausea and vomiting.   Genitourinary: Negative for dysuria, frequency, hematuria and urgency.   Musculoskeletal: Negative for back pain, joint pain and neck pain.   Skin: Negative for itching and rash.   Neurological: Negative for dizziness, tingling, tremors, weakness and headaches.   Endo/Heme/Allergies: Does not bruise/bleed easily.   Psychiatric/Behavioral: Negative for depression and memory loss. The patient is not nervous/anxious.        Objective Data:   Physical Exam:   Vitals:   Temp:  [36 °C (96.8 °F)-37 °C (98.6 °F)] 37 °C (98.6 °F)  Pulse:  [70-89] 89  Resp:  [15-18] 15  BP: ()/(53-77) 114/58  SpO2:  [91 %-95 %] 92 %     Physical Exam  Constitutional:       General: He is not in acute distress.     Appearance: He is not diaphoretic.   HENT:      Head: Normocephalic  and atraumatic.      Right Ear: External ear normal.      Left Ear: External ear normal.      Nose: Nose normal.      Mouth/Throat:      Pharynx: No oropharyngeal exudate or posterior oropharyngeal erythema.   Eyes:      Extraocular Movements: Extraocular movements intact.      Pupils: Pupils are equal, round, and reactive to light.   Neck:      Musculoskeletal: Normal range of motion. No neck rigidity or muscular tenderness.   Cardiovascular:      Rate and Rhythm: Normal rate.      Pulses: Normal pulses.      Heart sounds: Murmur present. No friction rub. No gallop.       Comments: Paced rhythm  Pulmonary:      Effort: Pulmonary effort is normal. No respiratory distress.      Breath sounds: Normal breath sounds. No stridor.   Abdominal:      General: There is no distension.      Palpations: There is no mass.      Tenderness: There is no abdominal tenderness.   Musculoskeletal:      Right lower leg: Edema present.      Left lower leg: Edema present.      Comments: 1+ edema in legs, chronic venous stasis changes present   Skin:     Capillary Refill: Capillary refill takes less than 2 seconds.      Findings: No bruising, erythema, lesion or rash.      Comments: Chronic right great toe wound present   Neurological:      General: No focal deficit present.      Mental Status: He is alert and oriented to person, place, and time.      Motor: No weakness.   Psychiatric:         Mood and Affect: Mood normal.         Behavior: Behavior normal.         Thought Content: Thought content normal.         Judgment: Judgment normal.           Labs:   Recent Results (from the past 24 hour(s))   BLOOD CULTURE    Collection Time: 12/29/20  6:40 PM    Specimen: Peripheral; Blood   Result Value Ref Range    Significant Indicator NEG     Source BLD     Site PERIPHERAL     Culture Result       No Growth  Note: Blood cultures are incubated for 5 days and  are monitored continuously.Positive blood cultures  are called to the RN and reported  as soon as  they are identified.     BLOOD CULTURE    Collection Time: 12/29/20  6:40 PM    Specimen: Peripheral; Blood   Result Value Ref Range    Significant Indicator NEG     Source BLD     Site PERIPHERAL     Culture Result       No Growth  Note: Blood cultures are incubated for 5 days and  are monitored continuously.Positive blood cultures  are called to the RN and reported as soon as  they are identified.     Basic Metabolic Panel    Collection Time: 12/30/20  4:11 AM   Result Value Ref Range    Sodium 136 135 - 145 mmol/L    Potassium 3.6 3.6 - 5.5 mmol/L    Chloride 106 96 - 112 mmol/L    Co2 18 (L) 20 - 33 mmol/L    Glucose 102 (H) 65 - 99 mg/dL    Bun 48 (H) 8 - 22 mg/dL    Creatinine 2.31 (H) 0.50 - 1.40 mg/dL    Calcium 8.2 (L) 8.5 - 10.5 mg/dL    Anion Gap 12.0 7.0 - 16.0   CBC WITH DIFFERENTIAL    Collection Time: 12/30/20  4:11 AM   Result Value Ref Range    WBC 7.4 4.8 - 10.8 K/uL    RBC 2.81 (L) 4.70 - 6.10 M/uL    Hemoglobin 8.7 (L) 14.0 - 18.0 g/dL    Hematocrit 28.0 (L) 42.0 - 52.0 %    MCV 99.6 (H) 81.4 - 97.8 fL    MCH 31.0 27.0 - 33.0 pg    MCHC 31.1 (L) 33.7 - 35.3 g/dL    RDW 76.1 (H) 35.9 - 50.0 fL    Platelet Count 60 (L) 164 - 446 K/uL    MPV 10.6 9.0 - 12.9 fL    Neutrophils-Polys 60.60 44.00 - 72.00 %    Lymphocytes 8.40 (L) 22.00 - 41.00 %    Monocytes 30.60 (H) 0.00 - 13.40 %    Eosinophils 0.00 0.00 - 6.90 %    Basophils 0.00 0.00 - 1.80 %    Immature Granulocytes 0.40 0.00 - 0.90 %    Nucleated RBC 0.50 /100 WBC    Neutrophils (Absolute) 4.49 1.82 - 7.42 K/uL    Lymphs (Absolute) 0.62 (L) 1.00 - 4.80 K/uL    Monos (Absolute) 2.27 (H) 0.00 - 0.85 K/uL    Eos (Absolute) 0.00 0.00 - 0.51 K/uL    Baso (Absolute) 0.00 0.00 - 0.12 K/uL    Immature Granulocytes (abs) 0.03 0.00 - 0.11 K/uL    NRBC (Absolute) 0.04 K/uL   ESTIMATED GFR    Collection Time: 12/30/20  4:11 AM   Result Value Ref Range    GFR If  33 (A) >60 mL/min/1.73 m 2    GFR If Non  27 (A) >60  mL/min/1.73 m 2     Imaging:   DX-CHEST-PORTABLE (1 VIEW)   Final Result      No significant change from prior exam.      DX-TOE(S) 2+ RIGHT   Final Result      1.  Hallux valgus with bunion formation and severe degenerative change of RIGHT 1st metatarsophalangeal joint.   2.  No fracture or gross focal bony destruction.      US-RUQ   Final Result      Cholelithiasis with borderline gallbladder wall thickening which may related to right heart failure (versus cholecystitis) as there is evidence of pleural fluid and ascites with bidirectional flow in the main portal vein.      Echogenic right kidney with renal cortical thinning consistent with medical renal disease.      EC-ECHOCARDIOGRAM COMPLETE W/O CONT   Final Result      NM-LUNG VENT/PERF IMAGING   Final Result      Low probability VQ scan.      US-EXTREMITY VENOUS LOWER BILAT   Final Result      DX-CHEST-PORTABLE (1 VIEW)   Final Result      1.  Cardiomegaly.      2.  Bibasilar atelectasis.        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 fib status post AICD placement 3601-1330, pulmonary hypertension, severe tricuspid regurgitation, mitral valve regurgitation status post intervention October 2020, CML, prostate cancer status post resection, skin cancer who presents with shortness of breath and weakness for 3 days, edema in legs. Peripheral smear showed monocytosis, bone marrow biopsy ordered to r/o leukemia, myeloproliferative disorders.    * Acute respiratory failure with hypoxia (HCC)- (present on admission)  Assessment & Plan  Presented 12/27 for fatigue, unresponsiveness at home  86% on room air on presentation, was on HFNC, now on NC 6L  DDx: Pneumonia/right foot ulcer, HFrEF, DVT/PE  US LE without DVT, V/Q scan negative  RUQ ultrasound shows cholelithiasis with borderline gallbladder wall thickening, may related to right heart failure (versus cholecystitis),  "echogenic right kidney with renal cortical thinning  Echocardiogram 12/27 -LVEF 25%, dilated right ventricle, severe tricuspid regurgitation  Borderline hypotensive in ER, 1 L bolus was given in ER, hold fluids and diuresis   Has hx of CML, easy bleeding, low platelets, hold anticoagulation for now as pt is intermediate risk for PE  PLAN:  -Continue to monitor, improving without diuresis  -Likely not due to CHF as echocardiogram is largely unchanged from echo on 11/30/2020  -Likely secondary to infectious source either lung or toe    Monocytosis- (present on admission)  Assessment & Plan  Discussed peripheral smear with Dr. Cuello  - marked monocytosis with erythroblasts/pRBCs, bone marrow biopsy recommended to exclude leukemia, myeloproliferative disorders  - 43.70% monocytes on 12/29 AM CBC  PLAN:  - Bone marrow biopsy this afternoon, follow results.    Leukocytosis- (present on admission)  Assessment & Plan  WBC 42.0 in setting of CML?, dropped to 31.8 next day, now 10.5, blast crisis active versus sepsis versus leukocytosis  Patient hypoxic, other possible source of infection includes right toe chronic ulcer or leg cellulitis  Chest X-ray showed possible opacities in right lung  Blood Cultures x2+ for E coli pan sensitive  Plan:  Will treat with ceftriaxone and doxycycline  Repeat AP chest x-ray when patient can stand    Acute on chronic congestive heart failure (HCC)- (present on admission)  Assessment & Plan  Echocardiogram 12/27 -LVEF 25%, dilated right ventricle, severe tricuspid regurgitation  Reports taking bumex per [facility] discharge summary, seen in \"care everywhere\" tab  Plan:  Carvedilol 3.125 mg BID  Imdur 30 mg BID  Hydralazine 25 mg BID  Losartan 25 mg  Hold spironolactone for hypotension, consider starting later  PCV13 ordered per core measures    Ulcer of great toe, right, with unspecified severity (HCC)- (present on admission)  Assessment & Plan  Ulcer right great toe noted, does not appear to " penetrate muscle or to bone  Chronic for several months at least per patient  Right great toe x-ray no conistent to rule out osteomyelitis:  - Hallux valgus with bunion formation and severe degenerative change of RIGHT 1st metatarsophalangeal joint, not in same location as ulcer - no signs of osteomyelitis  Plan:  -Doxycycline ordered for gram-positive coverage and MRSA coverage  - wound care consult: clean out wound and bandage    JC (acute kidney injury) (HCC)- (present on admission)  Assessment & Plan  Cr 2.2 in 11/2020 at [facility]  Elevated Cr 2.8 here in ER  Possible prerenal due to hypovalemia  Given IV fluid bolus in ER, will hold IV fluids and diuresis due due HfREF  Plan:  Monitor  Consider nephrology consult if not improving    Tricuspid regurgitation- (present on admission)  Assessment & Plan  Echo 12/27 showed severe tricuspid regurgitation  Recommend to to establish with cardiology here in [place]  Discussed with cardiology, tricuspid clips are not done in [place] and are referred to  [place] on outpatient basis    Mitral regurgitation- (present on admission)  Assessment & Plan  History of, s/p mitral valve clip in 10/2020  Echocardiogram 12/27 showed mild mitral stenosis    Chronic atrial fibrillation (HCC)- (present on admission)  Assessment & Plan  Not in rapid ventricular response  Does have AICD for heart failure in place  Continue home medications, not on anticoagulation per records review, likely due to hx easy bleeding and CML  Continue home carvedilol, titrate up dose as appropriate    BPH (benign prostatic hyperplasia)- (present on admission)  Assessment & Plan  Continue home tamsulosin    Elevated liver enzymes- (present on admission)  Assessment & Plan  AST/ALT normal in 11/2020 at [facility], t bili was 2.4; AST//218, T bili 3.6 admission  History of heavy drinking per patient, none now per his report -though patient reports 12 to 24 pack use appear  Hepatitis panel negative  US liver ordered  - cholelithiasis with borderline gallbladder wall thickening, right heart failure (versus cholecystitis) , echogenic right kidney with renal cortical thinning   PLAN:  Outpatient GI follow-up  Advised to quit drinking alcohol    * Code Status: DNR/DNI  * Diet: Cardiac/Renal  * Lines/Tubes/Drains: PIV  * IVF: none indicated  * Prophylaxis:        -- DVT: heparin        -- GI: omeprazole  * PCP:   * Social / DC planning: likely to SNF Sub[facility] once acute illness(es) have been addressed  * Dispo: Possibly SNF, social work working on

## 2020-12-30 NOTE — CARE PLAN
Problem: Safety  Goal: Will remain free from falls  Outcome: PROGRESSING AS EXPECTED  Intervention: Implement fall precautions  Flowsheets  Taken 12/30/2020 0800 by Mag Cha R.N.  Environmental Precautions:   Bed in Low Position   Mobility Assessed & Appropriate Sign Placed  Taken 12/28/2020 0933 by Inna Marie R.N.  Bed Alarm: Yes - Alarm On  Bedrails: Bedrails Closest to Bathroom Down  Chair/Bed Strip Alarm: Yes - Alarm On     Problem: Skin Integrity  Goal: Risk for impaired skin integrity will decrease  Outcome: PROGRESSING AS EXPECTED  Intervention: Implement precautions to protect skin integrity in collaboration with the interdisciplinary team  Flowsheets  Taken 12/30/2020 0900 by Inna Marie R.N.  Patient Turns / Repositioning: Sitting Up in Chair  Taken 12/30/2020 0800 by Mag Cha R.N.  Skin Preventative Measures:   Pillows in Use to Float Heels   Pillows in Use for Support / Positioning   Gray Foam for Oxygen Tubing (Pad ear protector)  Bed Types: Pressure Redistribution Mattress (Atmosair)  Friction Interventions: Draw Sheet / Pad Used for Repositioning  PT / OT Involved in Care:   Physical Therapy Involved   Occupational Therapy Involved  Moisturizers/Barriers: Barrier Cream  Patient is Receiving Nutrition: Oral Intake Adequate  Vitamin Therapy in Use: No  Activity: Bed  Assistance / Tolerance for Turning/Repositioning:   Assistance of Two or More   General Weakness

## 2020-12-30 NOTE — PROGRESS NOTES
Pt transported back to unit by [facility] RN via hospital bed. Transported with Zoll monitor and ACLS flex RN. Tele box reapplied and monitor room notified. Pt assessed. Biopsy site clean, dry, intact.

## 2020-12-31 LAB
ALBUMIN SERPL BCP-MCNC: 3.5 G/DL (ref 3.2–4.9)
ALBUMIN/GLOB SERPL: 1.3 G/DL
ALP SERPL-CCNC: 85 U/L (ref 30–99)
ALT SERPL-CCNC: 88 U/L (ref 2–50)
ANION GAP SERPL CALC-SCNC: 13 MMOL/L (ref 7–16)
AST SERPL-CCNC: 42 U/L (ref 12–45)
BASOPHILS # BLD AUTO: 0.1 % (ref 0–1.8)
BASOPHILS # BLD: 0.01 K/UL (ref 0–0.12)
BILIRUB SERPL-MCNC: 1.3 MG/DL (ref 0.1–1.5)
BUN SERPL-MCNC: 43 MG/DL (ref 8–22)
CALCIUM SERPL-MCNC: 8.5 MG/DL (ref 8.5–10.5)
CHLORIDE SERPL-SCNC: 105 MMOL/L (ref 96–112)
CO2 SERPL-SCNC: 17 MMOL/L (ref 20–33)
CREAT SERPL-MCNC: 2.22 MG/DL (ref 0.5–1.4)
EOSINOPHIL # BLD AUTO: 0 K/UL (ref 0–0.51)
EOSINOPHIL NFR BLD: 0 % (ref 0–6.9)
ERYTHROCYTE [DISTWIDTH] IN BLOOD BY AUTOMATED COUNT: 77.9 FL (ref 35.9–50)
GLOBULIN SER CALC-MCNC: 2.8 G/DL (ref 1.9–3.5)
GLUCOSE SERPL-MCNC: 129 MG/DL (ref 65–99)
HCT VFR BLD AUTO: 30 % (ref 42–52)
HGB BLD-MCNC: 9.5 G/DL (ref 14–18)
IMM GRANULOCYTES # BLD AUTO: 0.03 K/UL (ref 0–0.11)
IMM GRANULOCYTES NFR BLD AUTO: 0.4 % (ref 0–0.9)
LYMPHOCYTES # BLD AUTO: 0.46 K/UL (ref 1–4.8)
LYMPHOCYTES NFR BLD: 5.9 % (ref 22–41)
MAGNESIUM SERPL-MCNC: 1.8 MG/DL (ref 1.5–2.5)
MCH RBC QN AUTO: 31.4 PG (ref 27–33)
MCHC RBC AUTO-ENTMCNC: 31.7 G/DL (ref 33.7–35.3)
MCV RBC AUTO: 99 FL (ref 81.4–97.8)
MONOCYTES # BLD AUTO: 2.41 K/UL (ref 0–0.85)
MONOCYTES NFR BLD AUTO: 30.9 % (ref 0–13.4)
NEUTROPHILS # BLD AUTO: 4.88 K/UL (ref 1.82–7.42)
NEUTROPHILS NFR BLD: 62.7 % (ref 44–72)
NRBC # BLD AUTO: 0.06 K/UL
NRBC BLD-RTO: 0.8 /100 WBC
PLATELET # BLD AUTO: 67 K/UL (ref 164–446)
PMV BLD AUTO: 11.3 FL (ref 9–12.9)
POTASSIUM SERPL-SCNC: 4 MMOL/L (ref 3.6–5.5)
PROT SERPL-MCNC: 6.3 G/DL (ref 6–8.2)
RBC # BLD AUTO: 3.03 M/UL (ref 4.7–6.1)
SODIUM SERPL-SCNC: 135 MMOL/L (ref 135–145)
WBC # BLD AUTO: 7.8 K/UL (ref 4.8–10.8)

## 2020-12-31 PROCEDURE — 700102 HCHG RX REV CODE 250 W/ 637 OVERRIDE(OP): Performed by: STUDENT IN AN ORGANIZED HEALTH CARE EDUCATION/TRAINING PROGRAM

## 2020-12-31 PROCEDURE — A9270 NON-COVERED ITEM OR SERVICE: HCPCS | Performed by: STUDENT IN AN ORGANIZED HEALTH CARE EDUCATION/TRAINING PROGRAM

## 2020-12-31 PROCEDURE — 99356 PR PROLONGED SVC I/P OR OBS SETTING 1ST HOUR: CPT | Mod: GC | Performed by: HOSPITALIST

## 2020-12-31 PROCEDURE — A9270 NON-COVERED ITEM OR SERVICE: HCPCS | Performed by: HOSPITALIST

## 2020-12-31 PROCEDURE — 85025 COMPLETE CBC W/AUTO DIFF WBC: CPT

## 2020-12-31 PROCEDURE — 97530 THERAPEUTIC ACTIVITIES: CPT | Mod: CQ

## 2020-12-31 PROCEDURE — 36415 COLL VENOUS BLD VENIPUNCTURE: CPT

## 2020-12-31 PROCEDURE — 770020 HCHG ROOM/CARE - TELE (206)

## 2020-12-31 PROCEDURE — 83735 ASSAY OF MAGNESIUM: CPT

## 2020-12-31 PROCEDURE — 97116 GAIT TRAINING THERAPY: CPT | Mod: CQ

## 2020-12-31 PROCEDURE — 700102 HCHG RX REV CODE 250 W/ 637 OVERRIDE(OP): Performed by: HOSPITALIST

## 2020-12-31 PROCEDURE — 99232 SBSQ HOSP IP/OBS MODERATE 35: CPT | Mod: GC,25 | Performed by: HOSPITALIST

## 2020-12-31 PROCEDURE — 700111 HCHG RX REV CODE 636 W/ 250 OVERRIDE (IP): Performed by: HOSPITALIST

## 2020-12-31 PROCEDURE — 80053 COMPREHEN METABOLIC PANEL: CPT

## 2020-12-31 RX ADMIN — LOSARTAN POTASSIUM 25 MG: 25 TABLET, FILM COATED ORAL at 08:11

## 2020-12-31 RX ADMIN — HEPARIN SODIUM 5000 UNITS: 5000 INJECTION, SOLUTION INTRAVENOUS; SUBCUTANEOUS at 17:33

## 2020-12-31 RX ADMIN — CARVEDILOL 3.12 MG: 3.12 TABLET, FILM COATED ORAL at 05:04

## 2020-12-31 RX ADMIN — DOXYCYCLINE 100 MG: 100 TABLET, FILM COATED ORAL at 05:02

## 2020-12-31 RX ADMIN — DOXYCYCLINE 100 MG: 100 TABLET, FILM COATED ORAL at 17:33

## 2020-12-31 RX ADMIN — ALLOPURINOL 150 MG: 300 TABLET ORAL at 05:01

## 2020-12-31 RX ADMIN — CARVEDILOL 3.12 MG: 3.12 TABLET, FILM COATED ORAL at 17:33

## 2020-12-31 RX ADMIN — TAMSULOSIN HYDROCHLORIDE 0.4 MG: 0.4 CAPSULE ORAL at 05:00

## 2020-12-31 RX ADMIN — OXYCODONE HYDROCHLORIDE 10 MG: 10 TABLET ORAL at 17:33

## 2020-12-31 RX ADMIN — ISOSORBIDE MONONITRATE 30 MG: 30 TABLET, EXTENDED RELEASE ORAL at 05:51

## 2020-12-31 RX ADMIN — DOCUSATE SODIUM 50 MG AND SENNOSIDES 8.6 MG 2 TABLET: 8.6; 5 TABLET, FILM COATED ORAL at 05:00

## 2020-12-31 RX ADMIN — OXYCODONE HYDROCHLORIDE 10 MG: 10 TABLET ORAL at 02:47

## 2020-12-31 RX ADMIN — OXYCODONE HYDROCHLORIDE 10 MG: 10 TABLET ORAL at 13:26

## 2020-12-31 RX ADMIN — HEPARIN SODIUM 5000 UNITS: 5000 INJECTION, SOLUTION INTRAVENOUS; SUBCUTANEOUS at 05:00

## 2020-12-31 ASSESSMENT — ENCOUNTER SYMPTOMS
BACK PAIN: 0
CHILLS: 0
SINUS PAIN: 0
SORE THROAT: 0
WHEEZING: 0
WEAKNESS: 0
DIZZINESS: 0
WEIGHT LOSS: 1
NAUSEA: 0
MEMORY LOSS: 0
ABDOMINAL PAIN: 0
NERVOUS/ANXIOUS: 0
NECK PAIN: 0
HEADACHES: 0
DEPRESSION: 0
TREMORS: 0
TINGLING: 0
PALPITATIONS: 0
VOMITING: 0
FEVER: 0
DIARRHEA: 0
CONSTIPATION: 0
SHORTNESS OF BREATH: 1
BRUISES/BLEEDS EASILY: 0
COUGH: 0

## 2020-12-31 ASSESSMENT — FIBROSIS 4 INDEX: FIB4 SCORE: 5.55

## 2020-12-31 ASSESSMENT — COGNITIVE AND FUNCTIONAL STATUS - GENERAL
SUGGESTED CMS G CODE MODIFIER MOBILITY: CL
MOVING FROM LYING ON BACK TO SITTING ON SIDE OF FLAT BED: UNABLE
CLIMB 3 TO 5 STEPS WITH RAILING: A LOT
WALKING IN HOSPITAL ROOM: A LITTLE
STANDING UP FROM CHAIR USING ARMS: A LITTLE
MOBILITY SCORE: 11
TURNING FROM BACK TO SIDE WHILE IN FLAT BAD: UNABLE
MOVING TO AND FROM BED TO CHAIR: UNABLE

## 2020-12-31 ASSESSMENT — GAIT ASSESSMENTS
ASSISTIVE DEVICE: FRONT WHEEL WALKER
GAIT LEVEL OF ASSIST: MINIMAL ASSIST
DISTANCE (FEET): 15

## 2020-12-31 ASSESSMENT — PAIN DESCRIPTION - PAIN TYPE
TYPE: ACUTE PAIN

## 2020-12-31 NOTE — PROGRESS NOTES
12 hour chart check    Monitor Summary  Partial paced underlying aFib 74-96  BBB   R PVC    -/.16/-

## 2020-12-31 NOTE — DISCHARGE PLANNING
LSW met with pt at bedside to complete CTT assessment. Pt reports he uses a rollator walker. It is unclear if patient uses oxygen normally. Pt bouncing from topic to topic and it is difficult to follow. Pt reports that he was staying with his ex wife's house with his two sons and that he has a friend named Dilshad. Pt reports he has a PCP in [place] but it is unclear when this was or who the doctor was.    JUSTO Keith reported to LSW that ex-wife called and reported that pt was staying with her but he is not welcomed back. Ex-wife also reported that patient is a .    MD team came into room and LSW left so that pt could be seen. LSW to finish assessment at another time.     LSW left a VM for pt's son Edwin requesting a call back.

## 2020-12-31 NOTE — PROGRESS NOTES
Report received from day shift RN. Pt is in bed in lowest locked position with bed side table and call light within reach. Assessment performed. No further needs stated at this time. Pt is A&Ox4. 2 L NC with . Hourly rounding in place.

## 2020-12-31 NOTE — DISCHARGE PLANNING
Received Choice form at [phone]  Agency/Facility Name: [facility]   Referral sent per Choice form @ [phone]

## 2020-12-31 NOTE — PROGRESS NOTES
Received report from night shift RNAna. Assumed care of pt. Pt reports no needs at this time. Updated pt on plan of care. Pt resting comfortably in bed. Doctor present at bedside to discuss POC. Educated on use of call light. Hourly rounding and continuous monitoring in place.

## 2020-12-31 NOTE — WOUND TEAM
Wound team at bedside to assess right great toe, washed with soap and water, some old dried dirt and drainage removed. No ulcer present, toenail is mycotic and pieces of it have come off. Per patient report, toe was black and blue for years and toenail recently fell off. There are no advanced wound care needs or dressings indicated for this mycotic toenail. If medical team would like remainder of toenail removed, patient could be referred to podiatry. Patient did not tolerate very much cleaning of remaining toe bed. Pedal pulses assessed via doppler and DP/PT were multiphasic via doppler.

## 2020-12-31 NOTE — PROGRESS NOTES
Daily Progress Note:     Date of Service: 12/31/2020  Primary Team: [facility] Team   Attending: NITA Wheat M.D.   Senior Resident: Dr. Haji  Intern: Dr. Sanchez  Contact:  [phone]    Chief Complaint:   Shortness of breath, weakness    Subjective:  Patient reports that he feels same as yesterday. Alert and oriented x4 today.  No chest pain, nausea, vomiting, fever, chills.     WBC 7.8 this am. Continued evlevated monocytes (30.90%) on CBC on am labs.      Discussed during IDT rounds. PT/OT recommending SNF, working on possible placement to [facility], [place].  Patient is apparently staying with patient's ex-wife along with 2 sons.    Awaiting bone marrow biopsy results.    Consultants/Specialty:  N/A  Review of Systems:    Review of Systems   Constitutional: Positive for malaise/fatigue and weight loss. Negative for chills and fever.   HENT: Negative for congestion, ear discharge, ear pain, sinus pain and sore throat.    Respiratory: Positive for shortness of breath. Negative for cough and wheezing.    Cardiovascular: Negative for chest pain, palpitations and leg swelling.   Gastrointestinal: Negative for abdominal pain, constipation, diarrhea, nausea and vomiting.   Genitourinary: Negative for dysuria, frequency, hematuria and urgency.   Musculoskeletal: Negative for back pain, joint pain and neck pain.   Skin: Negative for itching and rash.   Neurological: Negative for dizziness, tingling, tremors, weakness and headaches.   Endo/Heme/Allergies: Does not bruise/bleed easily.   Psychiatric/Behavioral: Negative for depression and memory loss. The patient is not nervous/anxious.        Objective Data:   Physical Exam:   Vitals:   Temp:  [36.1 °C (97 °F)-37.1 °C (98.8 °F)] 36.1 °C (97 °F)  Pulse:  [] 84  Resp:  [12-18] 16  BP: ()/(64-74) 117/71  SpO2:  [90 %-97 %] 97 %     Physical Exam  Constitutional:       General: He is not in acute distress.     Appearance: He is not  diaphoretic.   HENT:      Head: Normocephalic and atraumatic.      Right Ear: External ear normal.      Left Ear: External ear normal.      Nose: Nose normal.      Mouth/Throat:      Pharynx: No oropharyngeal exudate or posterior oropharyngeal erythema.   Eyes:      Extraocular Movements: Extraocular movements intact.      Pupils: Pupils are equal, round, and reactive to light.   Neck:      Musculoskeletal: Normal range of motion. No neck rigidity or muscular tenderness.   Cardiovascular:      Rate and Rhythm: Normal rate.      Pulses: Normal pulses.      Heart sounds: Murmur present. No friction rub. No gallop.       Comments: Paced rhythm  Pulmonary:      Effort: Pulmonary effort is normal. No respiratory distress.      Breath sounds: Normal breath sounds. No stridor.   Abdominal:      General: There is no distension.      Palpations: There is no mass.      Tenderness: There is no abdominal tenderness.   Musculoskeletal:      Right lower leg: Edema present.      Left lower leg: Edema present.      Comments: 1+ edema in legs, chronic venous stasis changes present   Skin:     Capillary Refill: Capillary refill takes less than 2 seconds.      Findings: No bruising, erythema, lesion or rash.      Comments: Chronic right great toe wound present   Neurological:      General: No focal deficit present.      Mental Status: He is alert and oriented to person, place, and time.      Motor: No weakness.   Psychiatric:         Mood and Affect: Mood normal.         Behavior: Behavior normal.         Thought Content: Thought content normal.         Judgment: Judgment normal.           Labs:   Recent Results (from the past 24 hour(s))   Comp Metabolic Panel    Collection Time: 12/31/20  3:20 AM   Result Value Ref Range    Sodium 135 135 - 145 mmol/L    Potassium 4.0 3.6 - 5.5 mmol/L    Chloride 105 96 - 112 mmol/L    Co2 17 (L) 20 - 33 mmol/L    Anion Gap 13.0 7.0 - 16.0    Glucose 129 (H) 65 - 99 mg/dL    Bun 43 (H) 8 - 22 mg/dL     Creatinine 2.22 (H) 0.50 - 1.40 mg/dL    Calcium 8.5 8.5 - 10.5 mg/dL    AST(SGOT) 42 12 - 45 U/L    ALT(SGPT) 88 (H) 2 - 50 U/L    Alkaline Phosphatase 85 30 - 99 U/L    Total Bilirubin 1.3 0.1 - 1.5 mg/dL    Albumin 3.5 3.2 - 4.9 g/dL    Total Protein 6.3 6.0 - 8.2 g/dL    Globulin 2.8 1.9 - 3.5 g/dL    A-G Ratio 1.3 g/dL   CBC WITH DIFFERENTIAL    Collection Time: 12/31/20  3:20 AM   Result Value Ref Range    WBC 7.8 4.8 - 10.8 K/uL    RBC 3.03 (L) 4.70 - 6.10 M/uL    Hemoglobin 9.5 (L) 14.0 - 18.0 g/dL    Hematocrit 30.0 (L) 42.0 - 52.0 %    MCV 99.0 (H) 81.4 - 97.8 fL    MCH 31.4 27.0 - 33.0 pg    MCHC 31.7 (L) 33.7 - 35.3 g/dL    RDW 77.9 (H) 35.9 - 50.0 fL    Platelet Count 67 (L) 164 - 446 K/uL    MPV 11.3 9.0 - 12.9 fL    Neutrophils-Polys 62.70 44.00 - 72.00 %    Lymphocytes 5.90 (L) 22.00 - 41.00 %    Monocytes 30.90 (H) 0.00 - 13.40 %    Eosinophils 0.00 0.00 - 6.90 %    Basophils 0.10 0.00 - 1.80 %    Immature Granulocytes 0.40 0.00 - 0.90 %    Nucleated RBC 0.80 /100 WBC    Neutrophils (Absolute) 4.88 1.82 - 7.42 K/uL    Lymphs (Absolute) 0.46 (L) 1.00 - 4.80 K/uL    Monos (Absolute) 2.41 (H) 0.00 - 0.85 K/uL    Eos (Absolute) 0.00 0.00 - 0.51 K/uL    Baso (Absolute) 0.01 0.00 - 0.12 K/uL    Immature Granulocytes (abs) 0.03 0.00 - 0.11 K/uL    NRBC (Absolute) 0.06 K/uL   MAGNESIUM    Collection Time: 12/31/20  3:20 AM   Result Value Ref Range    Magnesium 1.8 1.5 - 2.5 mg/dL   ESTIMATED GFR    Collection Time: 12/31/20  3:20 AM   Result Value Ref Range    GFR If  34 (A) >60 mL/min/1.73 m 2    GFR If Non  28 (A) >60 mL/min/1.73 m 2     Imaging:   DX-CHEST-PORTABLE (1 VIEW)   Final Result      No significant change from prior exam.      DX-TOE(S) 2+ RIGHT   Final Result      1.  Hallux valgus with bunion formation and severe degenerative change of RIGHT 1st metatarsophalangeal joint.   2.  No fracture or gross focal bony destruction.      US-RUQ   Final Result       Cholelithiasis with borderline gallbladder wall thickening which may related to right heart failure (versus cholecystitis) as there is evidence of pleural fluid and ascites with bidirectional flow in the main portal vein.      Echogenic right kidney with renal cortical thinning consistent with medical renal disease.      EC-ECHOCARDIOGRAM COMPLETE W/O CONT   Final Result      NM-LUNG VENT/PERF IMAGING   Final Result      Low probability VQ scan.      US-EXTREMITY VENOUS LOWER BILAT   Final Result      DX-CHEST-PORTABLE (1 VIEW)   Final Result      1.  Cardiomegaly.      2.  Bibasilar atelectasis.        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 fib status post AICD placement [id number], pulmonary hypertension, severe tricuspid regurgitation, mitral valve regurgitation status post intervention October 2020, CML, prostate cancer status post resection, skin cancer who presents with shortness of breath and weakness for 3 days, edema in legs. Peripheral smear showed monocytosis, bone marrow biopsy ordered to r/o leukemia, myeloproliferative disorders.    * Acute respiratory failure with hypoxia (HCC)- (present on admission)  Assessment & Plan  Presented 12/27 for fatigue, unresponsiveness at home  86% on room air on presentation, was on HFNC, now on NC 6L  DDx: Pneumonia/right foot ulcer, HFrEF, DVT/PE  US LE without DVT, V/Q scan negative  RUQ ultrasound shows cholelithiasis with borderline gallbladder wall thickening, may related to right heart failure (versus cholecystitis), echogenic right kidney with renal cortical thinning  Echocardiogram 12/27 -LVEF 25%, dilated right ventricle, severe tricuspid regurgitation  Borderline hypotensive in ER, 1 L bolus was given in ER, hold fluids and diuresis   Has hx of CML, easy bleeding, low platelets, hold anticoagulation for now as pt is intermediate risk for PE  PLAN:  -Continue to  "monitor, improving without diuresis  -Likely not due to CHF as echocardiogram is largely unchanged from echo on 11/30/2020  -Likely secondary to infectious source either lung or toe    Monocytosis- (present on admission)  Assessment & Plan  Discussed peripheral smear with Dr. Cuello  - marked monocytosis with erythroblasts/pRBCs, bone marrow biopsy recommended to exclude leukemia, myeloproliferative disorders  - 43.70% monocytes on 12/29 AM CBC  PLAN:  - Bone marrow biopsy this afternoon, follow results.    Leukocytosis- (present on admission)  Assessment & Plan  WBC 42.0 in setting of CML?, dropped to 31.8 next day, now 10.5, blast crisis active versus sepsis versus leukocytosis  Patient hypoxic, other possible source of infection includes right toe chronic ulcer or leg cellulitis  Chest X-ray showed possible opacities in right lung  Blood Cultures x2+ for E coli pan sensitive  Plan:  Will treat with ceftriaxone and doxycycline for total of 7 days Abx (last day 1/2/21)    Acute on chronic congestive heart failure (HCC)- (present on admission)  Assessment & Plan  Echocardiogram 12/27 -LVEF 25%, dilated right ventricle, severe tricuspid regurgitation  Reports taking bumex per [facility] discharge summary, seen in \"care everywhere\" tab  Plan:  Carvedilol 3.125 mg BID  Imdur 30 mg BID  Hydralazine 25 mg BID  Losartan 25 mg  Hold spironolactone for hypotension, consider starting later  PCV13 ordered per core measures    Ulcer of great toe, right, with unspecified severity (HCC)- (present on admission)  Assessment & Plan  Ulcer right great toe noted, does not appear to penetrate muscle or to bone  Chronic for several months at least per patient  Right great toe x-ray no conistent to rule out osteomyelitis:  - Hallux valgus with bunion formation and severe degenerative change of RIGHT 1st metatarsophalangeal joint, not in same location as ulcer - no signs of osteomyelitis  Plan:  -Doxycycline ordered for gram-positive coverage " and MRSA coverage  - wound care consult: clean out wound and bandage    JC (acute kidney injury) (HCC)- (present on admission)  Assessment & Plan  Cr 2.2 in 11/2020 at [facility]  Elevated Cr 2.8 here in ER  Possible prerenal due to hypovalemia  Given IV fluid bolus in ER, will hold IV fluids and diuresis due due HfREF  Plan:  Monitor  Consider nephrology consult if not improving    Tricuspid regurgitation- (present on admission)  Assessment & Plan  Echo 12/27 showed severe tricuspid regurgitation  Recommend to to establish with cardiology here in [place]  Discussed with cardiology, tricuspid clips are not done in [place] and are referred to [facility] on outpatient basis    Mitral regurgitation- (present on admission)  Assessment & Plan  History of, s/p mitral valve clip in 10/2020  Echocardiogram 12/27 showed mild mitral stenosis    Chronic atrial fibrillation (HCC)- (present on admission)  Assessment & Plan  Not in rapid ventricular response  Does have AICD for heart failure in place  Continue home medications, not on anticoagulation per records review, likely due to hx easy bleeding and CML  Continue home carvedilol, titrate up dose as appropriate    BPH (benign prostatic hyperplasia)- (present on admission)  Assessment & Plan  Continue home tamsulosin    Elevated liver enzymes- (present on admission)  Assessment & Plan  AST/ALT normal in 11/2020 at [facility], t bili was 2.4; AST//218, T bili 3.6 admission  History of heavy drinking per patient, none now per his report -though patient reports 12 to 24 pack use appear  Hepatitis panel negative  US liver ordered - cholelithiasis with borderline gallbladder wall thickening, right heart failure (versus cholecystitis) , echogenic right kidney with renal cortical thinning   PLAN:  Outpatient GI follow-up  Advised to quit drinking alcohol    * Code Status: DNR/DNI  * Diet: Cardiac/Renal  * Lines/Tubes/Drains: PIV  * IVF: none indicated  * Prophylaxis:        -- DVT:  heparin        -- GI: omeprazole  * PCP: N/A  * Social / DC planning: likely to SNF Subacute Rehab once acute illness(es) have been addressed  * Dispo: Possibly [facility], social work working on

## 2020-12-31 NOTE — DISCHARGE PLANNING
Anticipated Discharge Disposition: SNF    Action: LSW met with patient and son at bedside and confirmed plan of [place] [facility] with hosp[facility] or for rehab. Pt reports his [facility] services may have been transferred to [place] from [place] already. Son reports [facility] has copy of DPOA on file and that son is POA. LSW to request file from [facility]. LSW provided patient with information on [facility] and contact info for Abhi in admission.    Barriers to Discharge: SNF acceptance, biopsy results    Plan: LSW to follow up with [facility] on pt's services and DPOA. LSW to follow up with [facility] home on acceptance.

## 2020-12-31 NOTE — DISCHARGE PLANNING
LSW met with patient at bedside. Pt agreeable to SNF and would like LSW to speak to son regarding choice. Pt is a - [facility] home is one he is interested in. Pt reports that he is waiting on biopsy results to better make decisions as to where he will go after SNF stay.    LSW spoke to pt's son Jose. Jose reports that he and pt moved back to [place] this month and that pt has been living with him for the last 3 years. In that time, [facility] [place]. Pt and son currently staying with pt's ex wife and other son Edwin. Jose reports that hospice is something they are thinking about for his dad. Per jose, pt makes $1150 a month on SSI. LSW emailed [facility] for pt to be screened for medicaid. Jose is also interested in vet's home for pt and consents to have LSW send choice there. LSW informed him that pt will need his . Jose to search for pt's . LSW faxed choice form to [employer].    LSW to reach out to Abhi from [facility].     Care Transition Team Assessment    Information Source  Orientation : Oriented x 4  Information Given By: Patient, Caregiver, Relative  Informant's Name: Patient and son Jose  Who is responsible for making decisions for patient? : Patient    Readmission Evaluation  Is this a readmission?: No    Elopement Risk  Legal Hold: No  Ambulatory or Self Mobile in Wheelchair: No-Not an Elopement Risk  Disoriented: No  Psychiatric Symptoms: None  History of Wandering: No  Elopement this Admit: No  Vocalizing Wanting to Leave: No  Displays Behaviors, Body Language Wanting to Leave: No-Not at Risk for Elopement  Elopement Risk: Not at Risk for Elopement    Interdisciplinary Discharge Planning  Lives with - Patient's Self Care Capacity: Adult Children  Housing / Facility: (Hotels)  Prior Services: None    Discharge Preparedness  What is your plan after discharge?: Uncertain - pending medical team collaboration  What are your discharge supports?: Child  Prior  Functional Level: Ambulatory, Independent with Activities of Daily Living, Independent with Medication Management, Uses Walker  Difficulity with ADLs: None  Difficulity with IADLs: None    Functional Assesment  Prior Functional Level: Ambulatory, Independent with Activities of Daily Living, Independent with Medication Management, Uses Walker    Finances  Financial Barriers to Discharge: No  Prescription Coverage: Yes    Vision / Hearing Impairment  Right Eye Vision: Impaired, Wears Glasses  Left Eye Vision: Impaired, Wears Glasses              Domestic Abuse  Have you ever been the victim of abuse or violence?: No    Psychological Assessment  History of Substance Abuse: None  History of Psychiatric Problems: No  Non-compliant with Treatment: No  Newly Diagnosed Illness: Yes    Discharge Risks or Barriers  Discharge risks or barriers?: Post-acute placement / services  Patient risk factors: Vulnerable adult

## 2020-12-31 NOTE — CARE PLAN
Problem: Communication  Goal: The ability to communicate needs accurately and effectively will improve  Outcome: PROGRESSING AS EXPECTED  Intervention: [place] patient and significant other/support system to call light to alert staff of needs  Note: Report received from day shift RN. Pt is in bed in lowest locked position with bed side table and call light within reach. Assessment performed. No further needs stated at this time.       Problem: Safety  Goal: Will remain free from injury  Outcome: PROGRESSING AS EXPECTED  Intervention: Provide assistance with mobility  Note: Pt bedside table and call-light are within reach, bed in lowest position and locked. Treaded socks on and pt has been educated on call light use and asked to call before getting up.

## 2021-01-01 PROBLEM — C93.10 CMML (CHRONIC MYELOMONOCYTIC LEUKEMIA) (HCC): Status: ACTIVE | Noted: 2020-12-27

## 2021-01-01 LAB
ANION GAP SERPL CALC-SCNC: 11 MMOL/L (ref 7–16)
ANISOCYTOSIS BLD QL SMEAR: ABNORMAL
BASOPHILS # BLD AUTO: 0.9 % (ref 0–1.8)
BASOPHILS # BLD: 0.07 K/UL (ref 0–0.12)
BUN SERPL-MCNC: 41 MG/DL (ref 8–22)
BURR CELLS BLD QL SMEAR: NORMAL
CALCIUM SERPL-MCNC: 8.5 MG/DL (ref 8.5–10.5)
CHLORIDE SERPL-SCNC: 104 MMOL/L (ref 96–112)
CO2 SERPL-SCNC: 17 MMOL/L (ref 20–33)
CREAT SERPL-MCNC: 2.01 MG/DL (ref 0.5–1.4)
EOSINOPHIL # BLD AUTO: 0.07 K/UL (ref 0–0.51)
EOSINOPHIL NFR BLD: 0.9 % (ref 0–6.9)
ERYTHROCYTE [DISTWIDTH] IN BLOOD BY AUTOMATED COUNT: 78.7 FL (ref 35.9–50)
GLUCOSE SERPL-MCNC: 113 MG/DL (ref 65–99)
HCT VFR BLD AUTO: 29 % (ref 42–52)
HGB BLD-MCNC: 9.1 G/DL (ref 14–18)
LYMPHOCYTES # BLD AUTO: 0.73 K/UL (ref 1–4.8)
LYMPHOCYTES NFR BLD: 9.5 % (ref 22–41)
MACROCYTES BLD QL SMEAR: ABNORMAL
MANUAL DIFF BLD: NORMAL
MCH RBC QN AUTO: 31.5 PG (ref 27–33)
MCHC RBC AUTO-ENTMCNC: 31.4 G/DL (ref 33.7–35.3)
MCV RBC AUTO: 100.3 FL (ref 81.4–97.8)
MONOCYTES # BLD AUTO: 2.39 K/UL (ref 0–0.85)
MONOCYTES NFR BLD AUTO: 31 % (ref 0–13.4)
MORPHOLOGY BLD-IMP: NORMAL
NEUTROPHILS # BLD AUTO: 4.45 K/UL (ref 1.82–7.42)
NEUTROPHILS NFR BLD: 57.8 % (ref 44–72)
NRBC # BLD AUTO: 0.05 K/UL
NRBC BLD-RTO: 0.6 /100 WBC
OVALOCYTES BLD QL SMEAR: NORMAL
PLATELET # BLD AUTO: 59 K/UL (ref 164–446)
PLATELET BLD QL SMEAR: NORMAL
PMV BLD AUTO: 10.5 FL (ref 9–12.9)
POIKILOCYTOSIS BLD QL SMEAR: NORMAL
POLYCHROMASIA BLD QL SMEAR: NORMAL
POTASSIUM SERPL-SCNC: 4.1 MMOL/L (ref 3.6–5.5)
RBC # BLD AUTO: 2.89 M/UL (ref 4.7–6.1)
RBC BLD AUTO: PRESENT
SODIUM SERPL-SCNC: 132 MMOL/L (ref 135–145)
TARGETS BLD QL SMEAR: NORMAL
WBC # BLD AUTO: 7.7 K/UL (ref 4.8–10.8)

## 2021-01-01 PROCEDURE — 97535 SELF CARE MNGMENT TRAINING: CPT

## 2021-01-01 PROCEDURE — 97530 THERAPEUTIC ACTIVITIES: CPT | Mod: CQ

## 2021-01-01 PROCEDURE — 700105 HCHG RX REV CODE 258: Performed by: STUDENT IN AN ORGANIZED HEALTH CARE EDUCATION/TRAINING PROGRAM

## 2021-01-01 PROCEDURE — 700102 HCHG RX REV CODE 250 W/ 637 OVERRIDE(OP): Performed by: STUDENT IN AN ORGANIZED HEALTH CARE EDUCATION/TRAINING PROGRAM

## 2021-01-01 PROCEDURE — 80048 BASIC METABOLIC PNL TOTAL CA: CPT

## 2021-01-01 PROCEDURE — A9270 NON-COVERED ITEM OR SERVICE: HCPCS | Performed by: HOSPITALIST

## 2021-01-01 PROCEDURE — 99232 SBSQ HOSP IP/OBS MODERATE 35: CPT | Mod: GC | Performed by: HOSPITALIST

## 2021-01-01 PROCEDURE — 85007 BL SMEAR W/DIFF WBC COUNT: CPT

## 2021-01-01 PROCEDURE — 36415 COLL VENOUS BLD VENIPUNCTURE: CPT

## 2021-01-01 PROCEDURE — A9270 NON-COVERED ITEM OR SERVICE: HCPCS | Performed by: STUDENT IN AN ORGANIZED HEALTH CARE EDUCATION/TRAINING PROGRAM

## 2021-01-01 PROCEDURE — 700102 HCHG RX REV CODE 250 W/ 637 OVERRIDE(OP): Performed by: HOSPITALIST

## 2021-01-01 PROCEDURE — 700111 HCHG RX REV CODE 636 W/ 250 OVERRIDE (IP): Performed by: HOSPITALIST

## 2021-01-01 PROCEDURE — 97116 GAIT TRAINING THERAPY: CPT | Mod: CQ

## 2021-01-01 PROCEDURE — 700111 HCHG RX REV CODE 636 W/ 250 OVERRIDE (IP): Performed by: STUDENT IN AN ORGANIZED HEALTH CARE EDUCATION/TRAINING PROGRAM

## 2021-01-01 PROCEDURE — 770020 HCHG ROOM/CARE - TELE (206)

## 2021-01-01 PROCEDURE — 85027 COMPLETE CBC AUTOMATED: CPT

## 2021-01-01 RX ADMIN — DOXYCYCLINE 100 MG: 100 TABLET, FILM COATED ORAL at 05:14

## 2021-01-01 RX ADMIN — HEPARIN SODIUM 5000 UNITS: 5000 INJECTION, SOLUTION INTRAVENOUS; SUBCUTANEOUS at 05:16

## 2021-01-01 RX ADMIN — OXYCODONE HYDROCHLORIDE 10 MG: 10 TABLET ORAL at 17:45

## 2021-01-01 RX ADMIN — LOSARTAN POTASSIUM 25 MG: 25 TABLET, FILM COATED ORAL at 05:14

## 2021-01-01 RX ADMIN — OXYCODONE HYDROCHLORIDE 10 MG: 10 TABLET ORAL at 22:11

## 2021-01-01 RX ADMIN — DOXYCYCLINE 100 MG: 100 TABLET, FILM COATED ORAL at 17:34

## 2021-01-01 RX ADMIN — HEPARIN SODIUM 5000 UNITS: 5000 INJECTION, SOLUTION INTRAVENOUS; SUBCUTANEOUS at 17:34

## 2021-01-01 RX ADMIN — TAMSULOSIN HYDROCHLORIDE 0.4 MG: 0.4 CAPSULE ORAL at 05:14

## 2021-01-01 RX ADMIN — OXYCODONE HYDROCHLORIDE 10 MG: 10 TABLET ORAL at 05:38

## 2021-01-01 RX ADMIN — DOCUSATE SODIUM 50 MG AND SENNOSIDES 8.6 MG 2 TABLET: 8.6; 5 TABLET, FILM COATED ORAL at 05:14

## 2021-01-01 RX ADMIN — CARVEDILOL 3.12 MG: 3.12 TABLET, FILM COATED ORAL at 05:14

## 2021-01-01 RX ADMIN — CEFTRIAXONE SODIUM 2 G: 2 INJECTION, POWDER, FOR SOLUTION INTRAMUSCULAR; INTRAVENOUS at 05:17

## 2021-01-01 RX ADMIN — ALLOPURINOL 150 MG: 300 TABLET ORAL at 05:14

## 2021-01-01 RX ADMIN — OXYCODONE HYDROCHLORIDE 10 MG: 10 TABLET ORAL at 00:21

## 2021-01-01 RX ADMIN — OXYCODONE HYDROCHLORIDE 10 MG: 10 TABLET ORAL at 11:43

## 2021-01-01 RX ADMIN — CARVEDILOL 3.12 MG: 3.12 TABLET, FILM COATED ORAL at 17:34

## 2021-01-01 RX ADMIN — ISOSORBIDE MONONITRATE 30 MG: 30 TABLET, EXTENDED RELEASE ORAL at 05:14

## 2021-01-01 ASSESSMENT — GAIT ASSESSMENTS
ASSISTIVE DEVICE: FRONT WHEEL WALKER
GAIT LEVEL OF ASSIST: MINIMAL ASSIST
DEVIATION: ANTALGIC;BRADYKINETIC;SHUFFLED GAIT;OTHER (COMMENT)
DISTANCE (FEET): 25

## 2021-01-01 ASSESSMENT — ENCOUNTER SYMPTOMS
FEVER: 0
BACK PAIN: 0
PALPITATIONS: 0
SINUS PAIN: 0
DIARRHEA: 0
TINGLING: 0
ABDOMINAL PAIN: 0
TREMORS: 0
NERVOUS/ANXIOUS: 0
WEIGHT LOSS: 1
DIZZINESS: 0
WHEEZING: 0
SHORTNESS OF BREATH: 0
NECK PAIN: 0
CHILLS: 0
VOMITING: 0
BRUISES/BLEEDS EASILY: 0
DEPRESSION: 0
COUGH: 0
NAUSEA: 0
SORE THROAT: 0
MEMORY LOSS: 0
WEAKNESS: 0
HEADACHES: 0
CONSTIPATION: 0

## 2021-01-01 ASSESSMENT — FIBROSIS 4 INDEX: FIB4 SCORE: 6.3

## 2021-01-01 ASSESSMENT — COGNITIVE AND FUNCTIONAL STATUS - GENERAL
HELP NEEDED FOR BATHING: A LOT
EATING MEALS: A LITTLE
PERSONAL GROOMING: A LITTLE
DAILY ACTIVITIY SCORE: 15
STANDING UP FROM CHAIR USING ARMS: A LITTLE
TURNING FROM BACK TO SIDE WHILE IN FLAT BAD: A LOT
MOVING TO AND FROM BED TO CHAIR: A LOT
DRESSING REGULAR LOWER BODY CLOTHING: A LOT
SUGGESTED CMS G CODE MODIFIER DAILY ACTIVITY: CK
MOBILITY SCORE: 14
CLIMB 3 TO 5 STEPS WITH RAILING: A LOT
SUGGESTED CMS G CODE MODIFIER MOBILITY: CL
TOILETING: A LOT
WALKING IN HOSPITAL ROOM: A LITTLE
MOVING FROM LYING ON BACK TO SITTING ON SIDE OF FLAT BED: A LOT
DRESSING REGULAR UPPER BODY CLOTHING: A LITTLE

## 2021-01-01 ASSESSMENT — PAIN DESCRIPTION - PAIN TYPE
TYPE: ACUTE PAIN
TYPE: ACUTE PAIN

## 2021-01-01 NOTE — PROGRESS NOTES
Daily Progress Note:     Date of Service: 1/1/2021  Primary Team: [facility] Orange Team   Attending: NITA Wheat M.D.   Senior Resident: Dr. Haji  Intern: Dr. Sanchez  Contact:  [phone]    Chief Complaint:   Shortness of breath, weakness    Subjective:  Patient reports continues feeling well, now on room air. No chest pain, nausea, vomiting, fever, chills.     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 The patient has   had a persistent peripheral blood monocytosis for >= 3 months. The bone   marrow demonstrates moderately increased blast cells and promonocytes.   No marked dysplasia is seen within the three cell lines. 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 The bone   marrow report will be finalized after receiving the results of the   molecular studies and cytogenetics.    Discussed during IDT rounds - placement to [facility] with rehab.      Consultants/Specialty:  N/A  Review of Systems:    Review of Systems   Constitutional: Positive for malaise/fatigue and weight loss. Negative for chills and fever.   HENT: Negative for congestion, ear discharge, ear pain, sinus pain and sore throat.    Respiratory: Negative for cough, shortness of breath and wheezing.    Cardiovascular: Negative for chest pain, palpitations and leg swelling.   Gastrointestinal: Negative for abdominal pain, constipation, diarrhea, nausea and vomiting.   Genitourinary: Negative for dysuria, frequency, hematuria and urgency.   Musculoskeletal: Negative for back pain, joint pain and neck pain.   Skin: Negative.    Neurological: Negative for dizziness, tingling, tremors, weakness and headaches.   Endo/Heme/Allergies: Does not bruise/bleed easily.   Psychiatric/Behavioral: Negative for depression and memory loss. The patient is not nervous/anxious.         Objective Data:   Physical Exam:   Vitals:   Temp:  [36.6 °C (97.8 °F)-37.1 °C (98.7 °F)] 36.8 °C (98.2 °F)  Pulse:  [] 107  Resp:  [16-22] 20  BP: (116-129)/(62-76) 116/74  SpO2:  [91 %-94 %] 94 %     Physical Exam  Constitutional:       General: He is not in acute distress.     Appearance: He is not diaphoretic.   HENT:      Head: Normocephalic and atraumatic.      Right Ear: External ear normal.      Left Ear: External ear normal.      Nose: Nose normal.      Mouth/Throat:      Pharynx: No oropharyngeal exudate or posterior oropharyngeal erythema.   Eyes:      Extraocular Movements: Extraocular movements intact.      Pupils: Pupils are equal, round, and reactive to light.   Neck:      Musculoskeletal: Normal range of motion. No neck rigidity or muscular tenderness.   Cardiovascular:      Rate and Rhythm: Normal rate.      Pulses: Normal pulses.      Heart sounds: Murmur present. No friction rub. No gallop.       Comments: Paced rhythm  Pulmonary:      Effort: Pulmonary effort is normal. No respiratory distress.      Breath sounds: Normal breath sounds. No stridor.   Abdominal:      General: There is no distension.      Palpations: There is no mass.      Tenderness: There is no abdominal tenderness.   Musculoskeletal:      Right lower leg: Edema present.      Left lower leg: Edema present.      Comments: 1+ edema in legs, chronic venous stasis changes present   Skin:     Capillary Refill: Capillary refill takes less than 2 seconds.      Findings: No bruising, erythema, lesion or rash.      Comments: Chronic right great toe wound present   Neurological:      General: No focal deficit present.      Mental Status: He is alert and oriented to person, place, and time.      Motor: No weakness.   Psychiatric:         Mood and Affect: Mood normal.         Behavior: Behavior normal.         Thought Content: Thought content normal.         Judgment: Judgment normal.           Labs:   Recent Results (from the  past 24 hour(s))   Basic Metabolic Panel    Collection Time: 01/01/21 12:15 AM   Result Value Ref Range    Sodium 132 (L) 135 - 145 mmol/L    Potassium 4.1 3.6 - 5.5 mmol/L    Chloride 104 96 - 112 mmol/L    Co2 17 (L) 20 - 33 mmol/L    Glucose 113 (H) 65 - 99 mg/dL    Bun 41 (H) 8 - 22 mg/dL    Creatinine 2.01 (H) 0.50 - 1.40 mg/dL    Calcium 8.5 8.5 - 10.5 mg/dL    Anion Gap 11.0 7.0 - 16.0   CBC WITH DIFFERENTIAL    Collection Time: 01/01/21 12:15 AM   Result Value Ref Range    WBC 7.7 4.8 - 10.8 K/uL    RBC 2.89 (L) 4.70 - 6.10 M/uL    Hemoglobin 9.1 (L) 14.0 - 18.0 g/dL    Hematocrit 29.0 (L) 42.0 - 52.0 %    .3 (H) 81.4 - 97.8 fL    MCH 31.5 27.0 - 33.0 pg    MCHC 31.4 (L) 33.7 - 35.3 g/dL    RDW 78.7 (H) 35.9 - 50.0 fL    Platelet Count 59 (L) 164 - 446 K/uL    MPV 10.5 9.0 - 12.9 fL    Neutrophils-Polys 57.80 44.00 - 72.00 %    Lymphocytes 9.50 (L) 22.00 - 41.00 %    Monocytes 31.00 (H) 0.00 - 13.40 %    Eosinophils 0.90 0.00 - 6.90 %    Basophils 0.90 0.00 - 1.80 %    Nucleated RBC 0.60 /100 WBC    Neutrophils (Absolute) 4.45 1.82 - 7.42 K/uL    Lymphs (Absolute) 0.73 (L) 1.00 - 4.80 K/uL    Monos (Absolute) 2.39 (H) 0.00 - 0.85 K/uL    Eos (Absolute) 0.07 0.00 - 0.51 K/uL    Baso (Absolute) 0.07 0.00 - 0.12 K/uL    NRBC (Absolute) 0.05 K/uL    Anisocytosis 2+ (A)     Macrocytosis 2+ (A)    ESTIMATED GFR    Collection Time: 01/01/21 12:15 AM   Result Value Ref Range    GFR If  39 (A) >60 mL/min/1.73 m 2    GFR If Non  32 (A) >60 mL/min/1.73 m 2   DIFFERENTIAL MANUAL    Collection Time: 01/01/21 12:15 AM   Result Value Ref Range    Manual Diff Status PERFORMED    PERIPHERAL SMEAR REVIEW    Collection Time: 01/01/21 12:15 AM   Result Value Ref Range    Peripheral Smear Review see below    PLATELET ESTIMATE    Collection Time: 01/01/21 12:15 AM   Result Value Ref Range    Plt Estimation Decreased    MORPHOLOGY    Collection Time: 01/01/21 12:15 AM   Result Value Ref Range     RBC Morphology Present     Polychromia 2+     Poikilocytosis 1+     Ovalocytes 1+     Target Cells 1+     Echinocytes 1+      Imaging:   DX-CHEST-PORTABLE (1 VIEW)   Final Result      No significant change from prior exam.      DX-TOE(S) 2+ RIGHT   Final Result      1.  Hallux valgus with bunion formation and severe degenerative change of RIGHT 1st metatarsophalangeal joint.   2.  No fracture or gross focal bony destruction.      US-RUQ   Final Result      Cholelithiasis with borderline gallbladder wall thickening which may related to right heart failure (versus cholecystitis) as there is evidence of pleural fluid and ascites with bidirectional flow in the main portal vein.      Echogenic right kidney with renal cortical thinning consistent with medical renal disease.      EC-ECHOCARDIOGRAM COMPLETE W/O CONT   Final Result      NM-LUNG VENT/PERF IMAGING   Final Result      Low probability VQ scan.      US-EXTREMITY VENOUS LOWER BILAT   Final Result      DX-CHEST-PORTABLE (1 VIEW)   Final Result      1.  Cardiomegaly.      2.  Bibasilar atelectasis.        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 fib status post AICD placement [id number], pulmonary hypertension, severe tricuspid regurgitation, mitral valve regurgitation status post intervention October 2020, CML, prostate cancer status post resection, skin cancer who presents with shortness of breath and weakness for 3 days, edema in legs. Peripheral smear showed monocytosis, bone marrow biopsy ordered to r/o leukemia, myeloproliferative disorders.    * Acute respiratory failure with hypoxia (HCC)- (present on admission)  Assessment & Plan  Presented 12/27 for fatigue, unresponsiveness at home  86% on room air on presentation requiring HFNC  US LE without DVT, V/Q scan negative  RUQ ultrasound shows cholelithiasis with borderline gallbladder wall thickening, may related to  "right heart failure (versus cholecystitis), echogenic right kidney with renal cortical thinning  Echocardiogram 12/27 -LVEF 25%, dilated right ventricle, severe tricuspid regurgitation  PLAN:  -Continue to monitor, improving to RA without diuresis  -Likely not due to CHF as echocardiogram is largely unchanged from echo on 11/30/2020  -Likely secondary to infectious source either lung or toe    Bacteremia due to Escherichia coli  Assessment & Plan  Continue IV ceftriaxone and doxycycline last day 1/2/2021    Monocytosis- (present on admission)  Assessment & Plan  Discussed peripheral smear with Dr. Cuello  - marked monocytosis with erythroblasts/pRBCs, bone marrow biopsy recommended to exclude leukemia, myeloproliferative disorders  - 43.70% monocytes on 12/29 AM CBC  PLAN:  - Bone marrow biopsy this afternoon, follow results.    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  Pending cytogenetics to risk stratify which will determine if chemotherapy will be beneficial    Acute on chronic congestive heart failure (HCC)- (present on admission)  Assessment & Plan  Echocardiogram 12/27 -LVEF 25%, dilated right ventricle, severe tricuspid regurgitation  Reports taking bumex per [facility] discharge summary, seen in \"care everywhere\" tab  Plan:  Carvedilol 3.125 mg BID  Imdur 30 mg BID  Hydralazine 25 mg BID  Losartan 25 mg  Blood pressure now improved, holding spironolactone due to GFR  PCV13 ordered per core measures    Ulcer of great toe, right, with unspecified severity (HCC)- (present on admission)  Assessment & Plan  Ulcer right great toe noted, does not appear to penetrate muscle or to bone  Chronic for several months at least per patient  Right great toe x-ray no conistent to rule out osteomyelitis:  - Hallux valgus with bunion formation and severe degenerative " change of RIGHT 1st metatarsophalangeal joint, not in same location as ulcer - no signs of osteomyelitis  Plan:  -Doxycycline ordered for gram-positive coverage and MRSA coverage  - wound care consult: clean out wound and bandage    JC (acute kidney injury) (HCC)- (present on admission)  Assessment & Plan  Cr 2.2 in 11/2020 at [facility]  Elevated Cr 2.8 here in ER  Now improving to creatinine 2.0, close to his previous baseline    Chronic atrial fibrillation (HCC)- (present on admission)  Assessment & Plan  Not in rapid ventricular response  Does have AICD for heart failure in place  Continue home medications, not on anticoagulation per records review, likely due to hx easy bleeding and CML  Continue home carvedilol, titrate up dose as appropriate    Tricuspid regurgitation- (present on admission)  Assessment & Plan  Echo 12/27 showed severe tricuspid regurgitation  Recommend to to establish with cardiology here in [place]  Discussed with cardiology, tricuspid clips are not done in [place] and are referred to [facility] Arauz on outpatient basis    Mitral regurgitation- (present on admission)  Assessment & Plan  History of, s/p mitral valve clip in 10/2020  Echocardiogram 12/27 showed mild mitral stenosis    BPH (benign prostatic hyperplasia)- (present on admission)  Assessment & Plan  Continue home tamsulosin    Elevated liver enzymes- (present on admission)  Assessment & Plan  AST/ALT normal in 11/2020 at [facility], t bili was 2.4; AST//218, T bili 3.6 admission  History of heavy drinking per patient, none now per his report -though patient reports 12 to 24 pack use appear  Hepatitis panel negative  US liver ordered - cholelithiasis with borderline gallbladder wall thickening, right heart failure (versus cholecystitis) , echogenic right kidney with renal cortical thinning   PLAN:  Outpatient GI follow-up  Advised to quit drinking alcohol    * Code Status: DNR/DNI  * Diet: Cardiac/Renal  * Lines/Tubes/Drains: PIV  * IVF:  none indicated  * Prophylaxis:        -- DVT: heparin        -- GI: omeprazole  * PCP: N/A  * Social / DC planning: likely to SNF Subacute Rehab once acute illness(es) have been addressed  * Dispo: NNV for SNF with rehab

## 2021-01-01 NOTE — THERAPY
"Occupational Therapy  Daily Treatment     Patient Name: Stephen Ortiz  Age:  85 y.o., Sex:  male  Medical Record #: [id number]  Today's Date: 1/1/2021     Precautions  Precautions: Fall Risk  Comments: new o2 needs    Assessment    Making progress, remains limited by weakness, fatigue, impaired balance, and impaired safety awareness which impacts independence in self care and functional mobility.    Plan    Continue current treatment plan.    DC Equipment Recommendations: Unable to determine at this time  Discharge Recommendations: Recommend post-acute placement for additional occupational therapy services prior to discharge home    Subjective    \"I got some good news this morning\"     Objective       01/01/21 1339   Cognition    Cognition / Consciousness WDL   Level of Consciousness Alert   Comments pleasant and cooperative, improved ability to follow directions today   Active ROM Upper Body   Comments more edema in LUE today   Bed Mobility    Scooting Supervised   Activities of Daily Living   Grooming Minimal Assist;Standing  (oral care, wash hands)   Lower Body Dressing Maximal Assist  (slippers)   Comments increased BLE edema today requiring more assistance to don slippers   Functional Mobility   Sit to Stand Minimal Assist   Bed, Chair, Wheelchair Transfer Minimal Assist   Mobility Chair>BR>Chair   Comments w/ fww   Patient / Family Goals   Patient / Family Goal #1 to go home   Short Term Goals   Short Term Goal # 1 pt will demo toilet txf with supv   Goal Outcome # 1 Progressing as expected   Short Term Goal # 2 pt will dress LB with supv and AE prn   Goal Outcome # 2 Goal not met   Short Term Goal # 3 pt will groom in stance w/ supv   Goal Outcome # 3 Progressing as expected         "

## 2021-01-01 NOTE — PROGRESS NOTES
Report received by day shift RN. Pt awake alert and oriented x 4 with no c/o pain. Discussed POC and pt verbalized understanding. Bed locked in low position with fall precautions in place and call light in reach.

## 2021-01-02 LAB
ANION GAP SERPL CALC-SCNC: 15 MMOL/L (ref 7–16)
BUN SERPL-MCNC: 44 MG/DL (ref 8–22)
CALCIUM SERPL-MCNC: 8.7 MG/DL (ref 8.5–10.5)
CHLORIDE SERPL-SCNC: 105 MMOL/L (ref 96–112)
CO2 SERPL-SCNC: 13 MMOL/L (ref 20–33)
CREAT SERPL-MCNC: 1.92 MG/DL (ref 0.5–1.4)
ERYTHROCYTE [DISTWIDTH] IN BLOOD BY AUTOMATED COUNT: 83.5 FL (ref 35.9–50)
GLUCOSE SERPL-MCNC: 126 MG/DL (ref 65–99)
HCT VFR BLD AUTO: 32.9 % (ref 42–52)
HGB BLD-MCNC: 10 G/DL (ref 14–18)
MAGNESIUM SERPL-MCNC: 1.8 MG/DL (ref 1.5–2.5)
MCH RBC QN AUTO: 31.3 PG (ref 27–33)
MCHC RBC AUTO-ENTMCNC: 30.4 G/DL (ref 33.7–35.3)
MCV RBC AUTO: 102.8 FL (ref 81.4–97.8)
PLATELET # BLD AUTO: 55 K/UL (ref 164–446)
PMV BLD AUTO: 10.5 FL (ref 9–12.9)
POTASSIUM SERPL-SCNC: 4.4 MMOL/L (ref 3.6–5.5)
RBC # BLD AUTO: 3.2 M/UL (ref 4.7–6.1)
SODIUM SERPL-SCNC: 133 MMOL/L (ref 135–145)
WBC # BLD AUTO: 7.3 K/UL (ref 4.8–10.8)

## 2021-01-02 PROCEDURE — 94760 N-INVAS EAR/PLS OXIMETRY 1: CPT

## 2021-01-02 PROCEDURE — A9270 NON-COVERED ITEM OR SERVICE: HCPCS | Performed by: STUDENT IN AN ORGANIZED HEALTH CARE EDUCATION/TRAINING PROGRAM

## 2021-01-02 PROCEDURE — 770020 HCHG ROOM/CARE - TELE (206)

## 2021-01-02 PROCEDURE — 700102 HCHG RX REV CODE 250 W/ 637 OVERRIDE(OP): Performed by: STUDENT IN AN ORGANIZED HEALTH CARE EDUCATION/TRAINING PROGRAM

## 2021-01-02 PROCEDURE — 700111 HCHG RX REV CODE 636 W/ 250 OVERRIDE (IP): Performed by: STUDENT IN AN ORGANIZED HEALTH CARE EDUCATION/TRAINING PROGRAM

## 2021-01-02 PROCEDURE — 83735 ASSAY OF MAGNESIUM: CPT

## 2021-01-02 PROCEDURE — 80048 BASIC METABOLIC PNL TOTAL CA: CPT

## 2021-01-02 PROCEDURE — 36415 COLL VENOUS BLD VENIPUNCTURE: CPT

## 2021-01-02 PROCEDURE — 99232 SBSQ HOSP IP/OBS MODERATE 35: CPT | Mod: GC | Performed by: HOSPITALIST

## 2021-01-02 PROCEDURE — 700102 HCHG RX REV CODE 250 W/ 637 OVERRIDE(OP): Performed by: HOSPITALIST

## 2021-01-02 PROCEDURE — 85027 COMPLETE CBC AUTOMATED: CPT

## 2021-01-02 PROCEDURE — 700105 HCHG RX REV CODE 258: Performed by: STUDENT IN AN ORGANIZED HEALTH CARE EDUCATION/TRAINING PROGRAM

## 2021-01-02 PROCEDURE — 700111 HCHG RX REV CODE 636 W/ 250 OVERRIDE (IP): Performed by: HOSPITALIST

## 2021-01-02 PROCEDURE — A9270 NON-COVERED ITEM OR SERVICE: HCPCS | Performed by: HOSPITALIST

## 2021-01-02 RX ADMIN — CARVEDILOL 3.12 MG: 3.12 TABLET, FILM COATED ORAL at 05:15

## 2021-01-02 RX ADMIN — DOCUSATE SODIUM 50 MG AND SENNOSIDES 8.6 MG 2 TABLET: 8.6; 5 TABLET, FILM COATED ORAL at 05:14

## 2021-01-02 RX ADMIN — CARVEDILOL 3.12 MG: 3.12 TABLET, FILM COATED ORAL at 16:46

## 2021-01-02 RX ADMIN — OXYCODONE HYDROCHLORIDE 10 MG: 10 TABLET ORAL at 13:16

## 2021-01-02 RX ADMIN — ISOSORBIDE MONONITRATE 30 MG: 30 TABLET, EXTENDED RELEASE ORAL at 05:15

## 2021-01-02 RX ADMIN — DOXYCYCLINE 100 MG: 100 TABLET, FILM COATED ORAL at 16:46

## 2021-01-02 RX ADMIN — HEPARIN SODIUM 5000 UNITS: 5000 INJECTION, SOLUTION INTRAVENOUS; SUBCUTANEOUS at 05:14

## 2021-01-02 RX ADMIN — TAMSULOSIN HYDROCHLORIDE 0.4 MG: 0.4 CAPSULE ORAL at 05:15

## 2021-01-02 RX ADMIN — OXYCODONE HYDROCHLORIDE 10 MG: 10 TABLET ORAL at 05:14

## 2021-01-02 RX ADMIN — ALLOPURINOL 150 MG: 300 TABLET ORAL at 05:15

## 2021-01-02 RX ADMIN — OXYCODONE HYDROCHLORIDE 10 MG: 10 TABLET ORAL at 18:11

## 2021-01-02 RX ADMIN — CEFTRIAXONE SODIUM 2 G: 2 INJECTION, POWDER, FOR SOLUTION INTRAMUSCULAR; INTRAVENOUS at 06:00

## 2021-01-02 RX ADMIN — DOXYCYCLINE 100 MG: 100 TABLET, FILM COATED ORAL at 05:15

## 2021-01-02 RX ADMIN — HEPARIN SODIUM 5000 UNITS: 5000 INJECTION, SOLUTION INTRAVENOUS; SUBCUTANEOUS at 16:46

## 2021-01-02 RX ADMIN — LOSARTAN POTASSIUM 25 MG: 25 TABLET, FILM COATED ORAL at 05:16

## 2021-01-02 ASSESSMENT — FIBROSIS 4 INDEX: FIB4 SCORE: 6.76

## 2021-01-02 ASSESSMENT — PAIN DESCRIPTION - PAIN TYPE
TYPE: ACUTE PAIN

## 2021-01-02 NOTE — THERAPY
"Physical Therapy   Daily Treatment     Patient Name: Stephen Ortiz  Age:  85 y.o., Sex:  male  Medical Record #: [id number]  Today's Date: 1/1/2021     Precautions: Fall Risk    Assessment    Pt continues to progress w/ therapy. Pt was limited today by pain in his R hip from the biopsy. He was able to ambulate farther but had to rely heavily on his UE's to offset weight bearing through the R. Pt stating motivation to get better and \"add 6 years to his time.\"    Plan    Continue current treatment plan.    DC Equipment Recommendations: Unable to determine at this time  Discharge Recommendations: Recommend post-acute placement for additional physical therapy services prior to discharge home      Subjective    \"It hurts but I know I should.\"     Objective       01/01/21 1204   Precautions   Precautions Fall Risk   Gait Analysis   Gait Level Of Assist Minimal Assist   Assistive Device Front Wheel Walker   Distance (Feet) 25   # of Times Distance was Traveled 1   Deviation Antalgic;Bradykinetic;Shuffled Gait;Other (Comment)  (Forward flexed d/t hip pain)   Comments Pt w/ heavier reliance on UE's today d/t R hip pain from biopsy.   Bed Mobility    Comments NT up in chair pre/post   Functional Mobility   Sit to Stand Minimal Assist   Bed, Chair, Wheelchair Transfer Minimal Assist   Transfer Method Other (Comments)  (Ambulating)   Mobility With FWW   Short Term Goals    Short Term Goal # 1 pt will perform supine <> sit without bed features with SPV in 6 visits to be able to get in/out of bed at home   Goal Outcome # 1 goal not met   Short Term Goal # 2 pt will perform all functional xfrs with SPV in 6 visits for improved independence   Goal Outcome # 2 Goal not met   Short Term Goal # 3 pt will ambulate 150ft with FWW and SPV in 6 visits to access home environment   Goal Outcome # 3 Goal not met         "

## 2021-01-02 NOTE — PROGRESS NOTES
Report received by day shift RN. Pt sleeping. Labs, orders and POC reviewed. Bed locked in low position with fall precautions in place.

## 2021-01-02 NOTE — PROGRESS NOTES
Daily Progress Note:     Date of Service: 1/2/2021  Primary Team: [facility] Team   Attending: NITA Wheat M.D.   Senior Resident: Dr. Haji  Intern: Dr. Sanchez  Contact:  [phone]    Chief Complaint:   Shortness of breath, weakness    Subjective:  Patient reports continues feeling well, now on room air. No chest pain, nausea, vomiting, fever, chills.  Antibiotics for E. coli bacteremia finish today.    Discussed with Dr. Campuzano on-call oncologist, he [facility].  Called son Rafael who confirms that he would [facility].  He is in the process of establishing [facility] who will be the  for all the referrals.  Emphasized to son that follow-up would be outpatient, but patient needs to follow-up with oncology relatively quickly after discharge.    Discussed during IDT rounds - placement to [facility] with rehab.      Consultants/Specialty:  N/A  Review of Systems:    Review of Systems   Constitutional: Positive for malaise/fatigue and weight loss. Negative for chills and fever.   HENT: Negative for congestion, ear discharge, ear pain, sinus pain and sore throat.    Respiratory: Negative for cough, shortness of breath and wheezing.    Cardiovascular: Negative for chest pain, palpitations and leg swelling.   Gastrointestinal: Negative for abdominal pain, constipation, diarrhea, nausea and vomiting.   Genitourinary: Negative for dysuria, frequency, hematuria and urgency.   Musculoskeletal: Negative for back pain, joint pain and neck pain.   Skin: Negative.    Neurological: Negative for dizziness, tingling, tremors, weakness and headaches.   Endo/Heme/Allergies: Does not bruise/bleed easily.   Psychiatric/Behavioral: Negative for depression and memory loss. The patient is not nervous/anxious.        Objective Data:   Physical Exam:   Vitals:   Temp:  [36.2 °C  (97.1 °F)-37.2 °C (99 °F)] 36.2 °C (97.1 °F)  Pulse:  [] 80  Resp:  [18-20] 18  BP: (108-132)/(64-84) 108/76  SpO2:  [92 %-95 %] 92 %     Physical Exam  Constitutional:       General: He is not in acute distress.     Appearance: He is not diaphoretic.   HENT:      Head: Normocephalic and atraumatic.      Right Ear: External ear normal.      Left Ear: External ear normal.      Nose: Nose normal.      Mouth/Throat:      Pharynx: No oropharyngeal exudate or posterior oropharyngeal erythema.   Eyes:      Extraocular Movements: Extraocular movements intact.      Pupils: Pupils are equal, round, and reactive to light.   Neck:      Musculoskeletal: Normal range of motion. No neck rigidity or muscular tenderness.   Cardiovascular:      Rate and Rhythm: Normal rate.      Pulses: Normal pulses.      Heart sounds: Murmur present. No friction rub. No gallop.       Comments: Paced rhythm  Pulmonary:      Effort: Pulmonary effort is normal. No respiratory distress.      Breath sounds: Normal breath sounds. No stridor.   Abdominal:      General: There is no distension.      Palpations: There is no mass.      Tenderness: There is no abdominal tenderness.   Musculoskeletal:      Right lower leg: Edema present.      Left lower leg: Edema present.      Comments: 1+ edema in legs, chronic venous stasis changes present   Skin:     Capillary Refill: Capillary refill takes less than 2 seconds.      Findings: No bruising, erythema, lesion or rash.      Comments: Chronic right great toe wound present   Neurological:      General: No focal deficit present.      Mental Status: He is alert and oriented to person, place, and time.      Motor: No weakness.   Psychiatric:         Mood and Affect: Mood normal.         Behavior: Behavior normal.         Thought Content: Thought content normal.         Judgment: Judgment normal.           Labs:   Recent Results (from the past 24 hour(s))   CBC WITHOUT DIFFERENTIAL    Collection Time: 01/02/21   4:24 AM   Result Value Ref Range    WBC 7.3 4.8 - 10.8 K/uL    RBC 3.20 (L) 4.70 - 6.10 M/uL    Hemoglobin 10.0 (L) 14.0 - 18.0 g/dL    Hematocrit 32.9 (L) 42.0 - 52.0 %    .8 (H) 81.4 - 97.8 fL    MCH 31.3 27.0 - 33.0 pg    MCHC 30.4 (L) 33.7 - 35.3 g/dL    RDW 83.5 (H) 35.9 - 50.0 fL    Platelet Count 55 (L) 164 - 446 K/uL    MPV 10.5 9.0 - 12.9 fL   Basic Metabolic Panel    Collection Time: 01/02/21  4:24 AM   Result Value Ref Range    Sodium 133 (L) 135 - 145 mmol/L    Potassium 4.4 3.6 - 5.5 mmol/L    Chloride 105 96 - 112 mmol/L    Co2 13 (L) 20 - 33 mmol/L    Glucose 126 (H) 65 - 99 mg/dL    Bun 44 (H) 8 - 22 mg/dL    Creatinine 1.92 (H) 0.50 - 1.40 mg/dL    Calcium 8.7 8.5 - 10.5 mg/dL    Anion Gap 15.0 7.0 - 16.0   MAGNESIUM    Collection Time: 01/02/21  4:24 AM   Result Value Ref Range    Magnesium 1.8 1.5 - 2.5 mg/dL   ESTIMATED GFR    Collection Time: 01/02/21  4:24 AM   Result Value Ref Range    GFR If  41 (A) >60 mL/min/1.73 m 2    GFR If Non  34 (A) >60 mL/min/1.73 m 2     Imaging:   DX-CHEST-PORTABLE (1 VIEW)   Final Result      No significant change from prior exam.      DX-TOE(S) 2+ RIGHT   Final Result      1.  Hallux valgus with bunion formation and severe degenerative change of RIGHT 1st metatarsophalangeal joint.   2.  No fracture or gross focal bony destruction.      US-RUQ   Final Result      Cholelithiasis with borderline gallbladder wall thickening which may related to right heart failure (versus cholecystitis) as there is evidence of pleural fluid and ascites with bidirectional flow in the main portal vein.      Echogenic right kidney with renal cortical thinning consistent with medical renal disease.      EC-ECHOCARDIOGRAM COMPLETE W/O CONT   Final Result      NM-LUNG VENT/PERF IMAGING   Final Result      Low probability VQ scan.      US-EXTREMITY VENOUS LOWER BILAT   Final Result      DX-CHEST-PORTABLE (1 VIEW)   Final Result      1.  Cardiomegaly.       2.  Bibasilar atelectasis.        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 fib status post AICD placement [id number], pulmonary hypertension, severe tricuspid regurgitation, mitral valve regurgitation status post intervention October 2020, CML, prostate cancer status post resection, skin cancer who presents with shortness of breath and weakness for 3 days, edema in legs. Peripheral smear showed monocytosis, bone marrow biopsy ordered to r/o leukemia, myeloproliferative disorders.    * Acute respiratory failure with hypoxia (HCC)- (present on admission)  Assessment & Plan  Presented 12/27 for fatigue, unresponsiveness at home  86% on room air on presentation requiring HFNC  US LE without DVT, V/Q scan negative  RUQ ultrasound shows cholelithiasis with borderline gallbladder wall thickening, may related to right heart failure (versus cholecystitis), echogenic right kidney with renal cortical thinning  Echocardiogram 12/27 -LVEF 25%, dilated right ventricle, severe tricuspid regurgitation  PLAN:  -Continue to monitor, improving to RA without diuresis  -Likely not due to CHF as echocardiogram is largely unchanged from echo on 11/30/2020  -Likely secondary to infectious source either lung or toe    Bacteremia due to Escherichia coli  Assessment & Plan  Continue IV ceftriaxone and doxycycline last day 1/2/2021    Monocytosis- (present on admission)  Assessment & Plan  Discussed peripheral smear with Dr. Cuello  - marked monocytosis with erythroblasts/pRBCs, bone marrow biopsy recommended to exclude leukemia, myeloproliferative disorders  - 43.70% monocytes on 12/29 AM CBC  -Bone marrow biopsy complete, consistent with CMML.    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   "Pending cytogenetics to risk stratify which will determine if chemotherapy will be beneficial  PLAN:  Discussed with oncology, recommend outpatient follow-up  [facility]    Acute on chronic congestive heart failure (HCC)- (present on admission)  Assessment & Plan  Echocardiogram 12/27 -LVEF 25%, dilated right ventricle, severe tricuspid regurgitation  Reports taking bumex per [facility] discharge summary, seen in \"care everywhere\" tab  Plan:  Carvedilol 3.125 mg BID  Imdur 30 mg BID  Hydralazine 25 mg BID  Losartan 25 mg  Blood pressure now improved, holding spironolactone due to GFR  PCV13 ordered per core measures    Ulcer of great toe, right, with unspecified severity (HCC)- (present on admission)  Assessment & Plan  Ulcer right great toe noted, does not appear to penetrate muscle or to bone  Chronic for several months at least per patient  Right great toe x-ray no conistent to rule out osteomyelitis:  - Hallux valgus with bunion formation and severe degenerative change of RIGHT 1st metatarsophalangeal joint, not in same location as ulcer - no signs of osteomyelitis  Plan:  -Doxycycline ordered for gram-positive coverage and MRSA coverage, last day of Abx 1/2/21  - wound care consult: clean out wound and bandage    JC (acute kidney injury) (HCC)- (present on admission)  Assessment & Plan  Cr 2.2 in 11/2020 at [facility]  Elevated Cr 2.8 here in ER  Now improving to creatinine 2.0, close to his previous baseline    Chronic atrial fibrillation (HCC)- (present on admission)  Assessment & Plan  Not in rapid ventricular response  Does have AICD for heart failure in place  Continue home medications, not on anticoagulation per records review, likely due to hx easy bleeding and CML  Continue home carvedilol, titrate up dose as appropriate    Tricuspid regurgitation- (present on admission)  Assessment & Plan  Echo 12/27 showed severe tricuspid regurgitation  Recommend to to establish with cardiology " here in   Discussed with cardiology, tricuspid clips are not done in [place] and are referred to  [place] on outpatient basis    Mitral regurgitation- (present on admission)  Assessment & Plan  History of, s/p mitral valve clip in 10/2020  Echocardiogram 12/27 showed mild mitral stenosis    BPH (benign prostatic hyperplasia)- (present on admission)  Assessment & Plan  Continue home tamsulosin    Elevated liver enzymes- (present on admission)  Assessment & Plan  AST/ALT normal in 11/2020 at [facility], t bili was 2.4; AST//218, T bili 3.6 admission  History of heavy drinking per patient, none now per his report -though patient reports 12 to 24 pack use appear  Hepatitis panel negative  US liver ordered - cholelithiasis with borderline gallbladder wall thickening, right heart failure (versus cholecystitis) , echogenic right kidney with renal cortical thinning   PLAN:  Outpatient GI follow-up  Advised to quit drinking alcohol    * Code Status: DNR/DNI  * Diet: Cardiac/Renal  * Lines/Tubes/Drains: PIV  * IVF: none indicated  * Prophylaxis:        -- DVT: heparin        -- GI: omeprazole  * PCP: N/A  * Social / DC planning: likely to SN[facility] once acute illness(es) have been addressed  * Dispo: [facility] for SNF with rehab

## 2021-01-02 NOTE — PROGRESS NOTES
Bedside report received. Patient A&O x4. 0.5L NC.  A. Fib on the monitor.  POC discussed with patient. Patient verbalized understanding. Call light and belongings within reach. Bed locked and in lowest position, alarm and fall precautions in place.

## 2021-01-03 LAB
ANION GAP SERPL CALC-SCNC: 14 MMOL/L (ref 7–16)
ANISOCYTOSIS BLD QL SMEAR: ABNORMAL
BACTERIA BLD CULT: NORMAL
BACTERIA BLD CULT: NORMAL
BASOPHILS # BLD AUTO: 0 % (ref 0–1.8)
BASOPHILS # BLD: 0 K/UL (ref 0–0.12)
BUN SERPL-MCNC: 43 MG/DL (ref 8–22)
CALCIUM SERPL-MCNC: 9 MG/DL (ref 8.5–10.5)
CHLORIDE SERPL-SCNC: 104 MMOL/L (ref 96–112)
CO2 SERPL-SCNC: 15 MMOL/L (ref 20–33)
CREAT SERPL-MCNC: 1.83 MG/DL (ref 0.5–1.4)
EOSINOPHIL # BLD AUTO: 0 K/UL (ref 0–0.51)
EOSINOPHIL NFR BLD: 0 % (ref 0–6.9)
ERYTHROCYTE [DISTWIDTH] IN BLOOD BY AUTOMATED COUNT: 86.9 FL (ref 35.9–50)
GLUCOSE SERPL-MCNC: 93 MG/DL (ref 65–99)
HCT VFR BLD AUTO: 32.3 % (ref 42–52)
HGB BLD-MCNC: 10 G/DL (ref 14–18)
LYMPHOCYTES # BLD AUTO: 2.23 K/UL (ref 1–4.8)
LYMPHOCYTES NFR BLD: 42 % (ref 22–41)
MACROCYTES BLD QL SMEAR: ABNORMAL
MANUAL DIFF BLD: NORMAL
MCH RBC QN AUTO: 31.7 PG (ref 27–33)
MCHC RBC AUTO-ENTMCNC: 31 G/DL (ref 33.7–35.3)
MCV RBC AUTO: 102.5 FL (ref 81.4–97.8)
MICROCYTES BLD QL SMEAR: ABNORMAL
MONOCYTES # BLD AUTO: 1.17 K/UL (ref 0–0.85)
MONOCYTES NFR BLD AUTO: 22 % (ref 0–13.4)
MORPHOLOGY BLD-IMP: NORMAL
MYELOCYTES NFR BLD MANUAL: 1 %
NEUTROPHILS # BLD AUTO: 1.86 K/UL (ref 1.82–7.42)
NEUTROPHILS NFR BLD: 34 % (ref 44–72)
NEUTS BAND NFR BLD MANUAL: 1 % (ref 0–10)
NRBC # BLD AUTO: 0.04 K/UL
NRBC BLD-RTO: 0.8 /100 WBC
OVALOCYTES BLD QL SMEAR: NORMAL
PLATELET # BLD AUTO: 56 K/UL (ref 164–446)
PLATELET BLD QL SMEAR: NORMAL
PMV BLD AUTO: 10.7 FL (ref 9–12.9)
POIKILOCYTOSIS BLD QL SMEAR: NORMAL
POTASSIUM SERPL-SCNC: 4.5 MMOL/L (ref 3.6–5.5)
RBC # BLD AUTO: 3.15 M/UL (ref 4.7–6.1)
RBC BLD AUTO: PRESENT
SIGNIFICANT IND 70042: NORMAL
SIGNIFICANT IND 70042: NORMAL
SITE SITE: NORMAL
SITE SITE: NORMAL
SODIUM SERPL-SCNC: 133 MMOL/L (ref 135–145)
SOURCE SOURCE: NORMAL
SOURCE SOURCE: NORMAL
WBC # BLD AUTO: 5.3 K/UL (ref 4.8–10.8)

## 2021-01-03 PROCEDURE — A9270 NON-COVERED ITEM OR SERVICE: HCPCS | Performed by: STUDENT IN AN ORGANIZED HEALTH CARE EDUCATION/TRAINING PROGRAM

## 2021-01-03 PROCEDURE — 94760 N-INVAS EAR/PLS OXIMETRY 1: CPT

## 2021-01-03 PROCEDURE — 770006 HCHG ROOM/CARE - MED/SURG/GYN SEMI*

## 2021-01-03 PROCEDURE — 700102 HCHG RX REV CODE 250 W/ 637 OVERRIDE(OP): Performed by: STUDENT IN AN ORGANIZED HEALTH CARE EDUCATION/TRAINING PROGRAM

## 2021-01-03 PROCEDURE — 36415 COLL VENOUS BLD VENIPUNCTURE: CPT

## 2021-01-03 PROCEDURE — A9270 NON-COVERED ITEM OR SERVICE: HCPCS | Performed by: HOSPITALIST

## 2021-01-03 PROCEDURE — 85027 COMPLETE CBC AUTOMATED: CPT

## 2021-01-03 PROCEDURE — 85007 BL SMEAR W/DIFF WBC COUNT: CPT

## 2021-01-03 PROCEDURE — 99232 SBSQ HOSP IP/OBS MODERATE 35: CPT | Mod: GC | Performed by: HOSPITALIST

## 2021-01-03 PROCEDURE — 700102 HCHG RX REV CODE 250 W/ 637 OVERRIDE(OP): Performed by: HOSPITALIST

## 2021-01-03 PROCEDURE — 700101 HCHG RX REV CODE 250: Performed by: STUDENT IN AN ORGANIZED HEALTH CARE EDUCATION/TRAINING PROGRAM

## 2021-01-03 PROCEDURE — 80048 BASIC METABOLIC PNL TOTAL CA: CPT

## 2021-01-03 PROCEDURE — 700111 HCHG RX REV CODE 636 W/ 250 OVERRIDE (IP): Performed by: STUDENT IN AN ORGANIZED HEALTH CARE EDUCATION/TRAINING PROGRAM

## 2021-01-03 PROCEDURE — 700111 HCHG RX REV CODE 636 W/ 250 OVERRIDE (IP): Performed by: HOSPITALIST

## 2021-01-03 RX ORDER — ACETAMINOPHEN 500 MG
500 TABLET ORAL ONCE
Status: COMPLETED | OUTPATIENT
Start: 2021-01-03 | End: 2021-01-03

## 2021-01-03 RX ORDER — MORPHINE SULFATE 4 MG/ML
1 INJECTION, SOLUTION INTRAMUSCULAR; INTRAVENOUS ONCE
Status: COMPLETED | OUTPATIENT
Start: 2021-01-03 | End: 2021-01-03

## 2021-01-03 RX ORDER — ACETAMINOPHEN 500 MG
1000 TABLET ORAL 3 TIMES DAILY
Status: DISCONTINUED | OUTPATIENT
Start: 2021-01-03 | End: 2021-01-04

## 2021-01-03 RX ORDER — MORPHINE SULFATE 4 MG/ML
1 INJECTION, SOLUTION INTRAMUSCULAR; INTRAVENOUS EVERY 4 HOURS PRN
Status: DISCONTINUED | OUTPATIENT
Start: 2021-01-03 | End: 2021-01-13 | Stop reason: HOSPADM

## 2021-01-03 RX ORDER — LIDOCAINE 50 MG/G
1 PATCH TOPICAL EVERY 24 HOURS
Status: DISCONTINUED | OUTPATIENT
Start: 2021-01-03 | End: 2021-01-13 | Stop reason: HOSPADM

## 2021-01-03 RX ORDER — ACETAMINOPHEN 500 MG
1000 TABLET ORAL ONCE
Status: DISCONTINUED | OUTPATIENT
Start: 2021-01-03 | End: 2021-01-03

## 2021-01-03 RX ORDER — MORPHINE SULFATE 4 MG/ML
2 INJECTION, SOLUTION INTRAMUSCULAR; INTRAVENOUS ONCE
Status: COMPLETED | OUTPATIENT
Start: 2021-01-03 | End: 2021-01-03

## 2021-01-03 RX ORDER — FUROSEMIDE 10 MG/ML
20 INJECTION INTRAMUSCULAR; INTRAVENOUS ONCE
Status: COMPLETED | OUTPATIENT
Start: 2021-01-03 | End: 2021-01-03

## 2021-01-03 RX ADMIN — ACETAMINOPHEN 500 MG: 500 TABLET ORAL at 14:34

## 2021-01-03 RX ADMIN — CARVEDILOL 3.12 MG: 3.12 TABLET, FILM COATED ORAL at 04:44

## 2021-01-03 RX ADMIN — FUROSEMIDE 20 MG: 10 INJECTION, SOLUTION INTRAMUSCULAR; INTRAVENOUS at 19:25

## 2021-01-03 RX ADMIN — LIDOCAINE 1 PATCH: 50 PATCH TOPICAL at 15:20

## 2021-01-03 RX ADMIN — ISOSORBIDE MONONITRATE 30 MG: 30 TABLET, EXTENDED RELEASE ORAL at 04:45

## 2021-01-03 RX ADMIN — OXYCODONE HYDROCHLORIDE 10 MG: 10 TABLET ORAL at 23:46

## 2021-01-03 RX ADMIN — ACETAMINOPHEN 500 MG: 500 TABLET ORAL at 18:27

## 2021-01-03 RX ADMIN — ALLOPURINOL 150 MG: 300 TABLET ORAL at 04:43

## 2021-01-03 RX ADMIN — OXYCODONE HYDROCHLORIDE 10 MG: 10 TABLET ORAL at 00:32

## 2021-01-03 RX ADMIN — MORPHINE SULFATE 1 MG: 4 INJECTION INTRAVENOUS at 18:00

## 2021-01-03 RX ADMIN — OXYCODONE HYDROCHLORIDE 10 MG: 10 TABLET ORAL at 19:24

## 2021-01-03 RX ADMIN — CARVEDILOL 3.12 MG: 3.12 TABLET, FILM COATED ORAL at 17:45

## 2021-01-03 RX ADMIN — LOSARTAN POTASSIUM 25 MG: 25 TABLET, FILM COATED ORAL at 04:43

## 2021-01-03 RX ADMIN — ACETAMINOPHEN 1000 MG: 500 TABLET ORAL at 19:24

## 2021-01-03 RX ADMIN — TAMSULOSIN HYDROCHLORIDE 0.4 MG: 0.4 CAPSULE ORAL at 04:45

## 2021-01-03 RX ADMIN — MORPHINE SULFATE 2 MG: 4 INJECTION INTRAVENOUS at 16:00

## 2021-01-03 RX ADMIN — OXYCODONE HYDROCHLORIDE 10 MG: 10 TABLET ORAL at 04:41

## 2021-01-03 RX ADMIN — OXYCODONE HYDROCHLORIDE 10 MG: 10 TABLET ORAL at 13:54

## 2021-01-03 RX ADMIN — HEPARIN SODIUM 5000 UNITS: 5000 INJECTION, SOLUTION INTRAVENOUS; SUBCUTANEOUS at 04:48

## 2021-01-03 ASSESSMENT — PAIN DESCRIPTION - PAIN TYPE
TYPE: ACUTE PAIN
TYPE: CHRONIC PAIN
TYPE: CHRONIC PAIN
TYPE: ACUTE PAIN
TYPE: ACUTE PAIN

## 2021-01-03 ASSESSMENT — FIBROSIS 4 INDEX: FIB4 SCORE: 6.64

## 2021-01-03 NOTE — PROGRESS NOTES
Daily Progress Note:     Date of Service: 1/3/2021  Primary Team: [facility]   Attending: NITA Wheat M.D.   Senior Resident: Dr. Haji  Intern: Dr. Sanchez  Contact:  [phone]    Chief Complaint:   Shortness of breath, weakness    Subjective:  Patient reports continuing to feel well, on less than 1 L of oxygen. No chest pain, nausea, vomiting, fever, chills.     We will continue to follow the bone marrow biopsy results while patient is in the hospital, patient [facility].    Discussed during IDT rounds - placement to [facility] with rehab.  Currently medical status, stable for transfer to SNF.    Consultants/Specialty:  N/A  Review of Systems:    Review of Systems   Constitutional: Positive for malaise/fatigue and weight loss. Negative for chills and fever.   HENT: Negative for congestion, ear discharge, ear pain, sinus pain and sore throat.    Respiratory: Negative for cough, shortness of breath and wheezing.    Cardiovascular: Negative for chest pain, palpitations and leg swelling.   Gastrointestinal: Negative for abdominal pain, constipation, diarrhea, nausea and vomiting.   Genitourinary: Negative for dysuria, frequency, hematuria and urgency.   Musculoskeletal: Negative for back pain, joint pain and neck pain.   Skin: Negative.    Neurological: Negative for dizziness, tingling, tremors, weakness and headaches.   Endo/Heme/Allergies: Does not bruise/bleed easily.   Psychiatric/Behavioral: Negative for depression and memory loss. The patient is not nervous/anxious.        Objective Data:   Physical Exam:   Vitals:   Temp:  [36.1 °C (97 °F)-36.6 °C (97.8 °F)] 36.1 °C (97 °F)  Pulse:  [65-88] 71  Resp:  [16-19] 16  BP: (106-116)/(48-75) 116/75  SpO2:  [90 %-97 %] 93 %     Physical Exam  Constitutional:       General: He is not in acute distress.     Appearance: He is not diaphoretic.   HENT:      Head: Normocephalic and atraumatic.       Right Ear: External ear normal.      Left Ear: External ear normal.      Nose: Nose normal.      Mouth/Throat:      Pharynx: No oropharyngeal exudate or posterior oropharyngeal erythema.   Eyes:      Extraocular Movements: Extraocular movements intact.      Pupils: Pupils are equal, round, and reactive to light.   Neck:      Musculoskeletal: Normal range of motion. No neck rigidity or muscular tenderness.   Cardiovascular:      Rate and Rhythm: Normal rate.      Pulses: Normal pulses.      Heart sounds: Murmur present. No friction rub. No gallop.       Comments: Paced rhythm  Pulmonary:      Effort: Pulmonary effort is normal. No respiratory distress.      Breath sounds: Normal breath sounds. No stridor.   Abdominal:      General: There is no distension.      Palpations: There is no mass.      Tenderness: There is no abdominal tenderness.   Musculoskeletal:      Right lower leg: Edema present.      Left lower leg: Edema present.      Comments: 1+ edema in legs, chronic venous stasis changes present   Skin:     Capillary Refill: Capillary refill takes less than 2 seconds.      Findings: No bruising, erythema, lesion or rash.      Comments: Chronic right great toe wound present   Neurological:      General: No focal deficit present.      Mental Status: He is alert and oriented to person, place, and time.      Motor: No weakness.   Psychiatric:         Mood and Affect: Mood normal.         Behavior: Behavior normal.         Thought Content: Thought content normal.         Judgment: Judgment normal.           Labs:   Recent Results (from the past 24 hour(s))   Basic Metabolic Panel    Collection Time: 01/03/21  6:16 AM   Result Value Ref Range    Sodium 133 (L) 135 - 145 mmol/L    Potassium 4.5 3.6 - 5.5 mmol/L    Chloride 104 96 - 112 mmol/L    Co2 15 (L) 20 - 33 mmol/L    Glucose 93 65 - 99 mg/dL    Bun 43 (H) 8 - 22 mg/dL    Creatinine 1.83 (H) 0.50 - 1.40 mg/dL    Calcium 9.0 8.5 - 10.5 mg/dL    Anion Gap 14.0  7.0 - 16.0   CBC WITH DIFFERENTIAL    Collection Time: 01/03/21  6:16 AM   Result Value Ref Range    WBC 5.3 4.8 - 10.8 K/uL    RBC 3.15 (L) 4.70 - 6.10 M/uL    Hemoglobin 10.0 (L) 14.0 - 18.0 g/dL    Hematocrit 32.3 (L) 42.0 - 52.0 %    .5 (H) 81.4 - 97.8 fL    MCH 31.7 27.0 - 33.0 pg    MCHC 31.0 (L) 33.7 - 35.3 g/dL    RDW 86.9 (H) 35.9 - 50.0 fL    Platelet Count 56 (L) 164 - 446 K/uL    MPV 10.7 9.0 - 12.9 fL    Neutrophils-Polys 34.00 (L) 44.00 - 72.00 %    Lymphocytes 42.00 (H) 22.00 - 41.00 %    Monocytes 22.00 (H) 0.00 - 13.40 %    Eosinophils 0.00 0.00 - 6.90 %    Basophils 0.00 0.00 - 1.80 %    Nucleated RBC 0.80 /100 WBC    Neutrophils (Absolute) 1.86 1.82 - 7.42 K/uL    Lymphs (Absolute) 2.23 1.00 - 4.80 K/uL    Monos (Absolute) 1.17 (H) 0.00 - 0.85 K/uL    Eos (Absolute) 0.00 0.00 - 0.51 K/uL    Baso (Absolute) 0.00 0.00 - 0.12 K/uL    NRBC (Absolute) 0.04 K/uL    Anisocytosis 2+ (A)     Macrocytosis 2+ (A)     Microcytosis 1+    ESTIMATED GFR    Collection Time: 01/03/21  6:16 AM   Result Value Ref Range    GFR If  43 (A) >60 mL/min/1.73 m 2    GFR If Non African American 35 (A) >60 mL/min/1.73 m 2   DIFFERENTIAL MANUAL    Collection Time: 01/03/21  6:16 AM   Result Value Ref Range    Bands-Stabs 1.00 0.00 - 10.00 %    Myelocytes 1.00 %    Manual Diff Status PERFORMED    PERIPHERAL SMEAR REVIEW    Collection Time: 01/03/21  6:16 AM   Result Value Ref Range    Peripheral Smear Review see below    PLATELET ESTIMATE    Collection Time: 01/03/21  6:16 AM   Result Value Ref Range    Plt Estimation Decreased    MORPHOLOGY    Collection Time: 01/03/21  6:16 AM   Result Value Ref Range    RBC Morphology Present     Poikilocytosis 1+     Ovalocytes 1+      Imaging:   DX-CHEST-PORTABLE (1 VIEW)   Final Result      No significant change from prior exam.      DX-TOE(S) 2+ RIGHT   Final Result      1.  Hallux valgus with bunion formation and severe degenerative change of RIGHT 1st  metatarsophalangeal joint.   2.  No fracture or gross focal bony destruction.      US-RUQ   Final Result      Cholelithiasis with borderline gallbladder wall thickening which may related to right heart failure (versus cholecystitis) as there is evidence of pleural fluid and ascites with bidirectional flow in the main portal vein.      Echogenic right kidney with renal cortical thinning consistent with medical renal disease.      EC-ECHOCARDIOGRAM COMPLETE W/O CONT   Final Result      NM-LUNG VENT/PERF IMAGING   Final Result      Low probability VQ scan.      US-EXTREMITY VENOUS LOWER BILAT   Final Result      DX-CHEST-PORTABLE (1 VIEW)   Final Result      1.  Cardiomegaly.      2.  Bibasilar atelectasis.        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 fib status post AICD placement [id number], pulmonary hypertension, severe tricuspid regurgitation, mitral valve regurgitation status post intervention October 2020, CML, prostate cancer status post resection, skin cancer who presents with shortness of breath and weakness for 3 days, edema in legs, improving. Peripheral smear showed monocytosis, bone marrow biopsy ordered to r/o leukemia, myeloproliferative disorders.    * Acute respiratory failure with hypoxia (HCC)- (present on admission)  Assessment & Plan  Presented 12/27 for fatigue, unresponsiveness at home  86% on room air on presentation requiring HFNC  US LE without DVT, V/Q scan negative  RUQ ultrasound shows cholelithiasis with borderline gallbladder wall thickening, may related to right heart failure (versus cholecystitis), echogenic right kidney with renal cortical thinning  Echocardiogram 12/27 -LVEF 25%, dilated right ventricle, severe tricuspid regurgitation  PLAN:  -Continue to monitor, shortness of breath improving without diuresis  -Likely not due to CHF as echocardiogram is largely unchanged from echo on  "11/30/2020  -Likely secondary to infectious source either lung or toe    Bacteremia due to Escherichia coli  Assessment & Plan  IV ceftriaxone and doxycycline for 7 days, last day 1/2/2021    Monocytosis- (present on admission)  Assessment & Plan  Discussed peripheral smear with Dr. Cuello  - marked monocytosis with erythroblasts/pRBCs, bone marrow biopsy recommended to exclude leukemia, myeloproliferative disorders  - 43.70% monocytes on 12/29 AM CBC  -Bone marrow biopsy complete, consistent with CMML.    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  Pending cytogenetics to risk stratify which will determine if chemotherapy will be beneficial  PLAN:  Follow cytogenetics results  Discussed with oncology, recommend outpatient follow-up  [facility]    Acute on chronic congestive heart failure (HCC)- (present on admission)  Assessment & Plan  Echocardiogram 12/27 -LVEF 25%, dilated right ventricle, severe tricuspid regurgitation  Reports taking bumex per [facility] discharge summary, seen in \"care everywhere\" tab  Plan:  Carvedilol 3.125 mg BID  Imdur 30 mg BID  Hydralazine 25 mg BID  Losartan 25 mg  Blood pressure now improved, holding spironolactone due to GFR  PCV13 ordered per core measures    Ulcer of great toe, right, with unspecified severity (HCC)- (present on admission)  Assessment & Plan  Ulcer right great toe noted, does not appear to penetrate muscle or to bone  Chronic for several months at least per patient  Right great toe x-ray no conistent to rule out osteomyelitis:  - Hallux valgus with bunion formation and severe degenerative change of RIGHT 1st metatarsophalangeal joint, not in same location as ulcer - no signs of osteomyelitis  Plan:  -Doxycycline ordered for gram-positive coverage and MRSA coverage, completed 7-day " course of ABX  - wound care consult: clean out wound and bandage    JC (acute kidney injury) (HCC)- (present on admission)  Assessment & Plan  Cr 2.2 in 11/2020 at [facility]  Elevated Cr 2.8 here in ER  Now improving to creatinine 2.0, close to his previous baseline    Chronic atrial fibrillation (HCC)- (present on admission)  Assessment & Plan  Not in rapid ventricular response  Does have AICD for heart failure in place  Continue home medications, not on anticoagulation per records review, likely due to hx easy bleeding and CML  Continue home carvedilol, titrate up dose as appropriate    Tricuspid regurgitation- (present on admission)  Assessment & Plan  Echo 12/27 showed severe tricuspid regurgitation  Recommend to to establish with cardiology here in [place]  Discussed with cardiology, tricuspid clips are not done in [place] and are referred to [facility] on outpatient basis    Mitral regurgitation- (present on admission)  Assessment & Plan  History of, s/p mitral valve clip in 10/2020  Echocardiogram 12/27 showed mild mitral stenosis    BPH (benign prostatic hyperplasia)- (present on admission)  Assessment & Plan  Continue home tamsulosin    Elevated liver enzymes- (present on admission)  Assessment & Plan  AST/ALT normal in 11/2020 at [facility], t bili was 2.4; AST//218, T bili 3.6 admission  History of heavy drinking per patient, none now per his report -though patient reports 12 to 24 pack use appear  Hepatitis panel negative  US liver ordered - cholelithiasis with borderline gallbladder wall thickening, right heart failure (versus cholecystitis) , echogenic right kidney with renal cortical thinning   PLAN:  Outpatient GI follow-up  Advised to quit drinking alcohol    * Code Status: DNR/DNI  * Diet: Cardiac/Renal  * Lines/Tubes/Drains: PIV  * IVF: none indicated  * Prophylaxis:        -- DVT: heparin        -- GI: omeprazole    * PCP: N/A  * Social / DC planning: likely to SNF [facility] once acute  illness(es) have been addressed  * Dispo: [facility] for SNF

## 2021-01-03 NOTE — PROGRESS NOTES
Assumed care of pt @ 1915, bedside report received from JSUTO Bauer.  Pt A&OX4 and denies any pain.  Bed locked and lowered with call light within reach and questions answered during present time.

## 2021-01-04 ENCOUNTER — APPOINTMENT (OUTPATIENT)
Dept: RADIOLOGY | Facility: MEDICAL CENTER | Age: 86
DRG: 189 | End: 2021-01-04
Attending: STUDENT IN AN ORGANIZED HEALTH CARE EDUCATION/TRAINING PROGRAM
Payer: COMMERCIAL

## 2021-01-04 LAB
ANION GAP SERPL CALC-SCNC: 14 MMOL/L (ref 7–16)
ANISOCYTOSIS BLD QL SMEAR: ABNORMAL
BASOPHILS # BLD AUTO: 0 % (ref 0–1.8)
BASOPHILS # BLD: 0 K/UL (ref 0–0.12)
BUN SERPL-MCNC: 45 MG/DL (ref 8–22)
BURR CELLS BLD QL SMEAR: NORMAL
CALCIUM SERPL-MCNC: 8.6 MG/DL (ref 8.5–10.5)
CHLORIDE SERPL-SCNC: 107 MMOL/L (ref 96–112)
CO2 SERPL-SCNC: 16 MMOL/L (ref 20–33)
CREAT SERPL-MCNC: 1.93 MG/DL (ref 0.5–1.4)
EOSINOPHIL # BLD AUTO: 0.04 K/UL (ref 0–0.51)
EOSINOPHIL NFR BLD: 0.9 % (ref 0–6.9)
ERYTHROCYTE [DISTWIDTH] IN BLOOD BY AUTOMATED COUNT: 84.7 FL (ref 35.9–50)
FOLATE SERPL-MCNC: 10 NG/ML
GLUCOSE SERPL-MCNC: 104 MG/DL (ref 65–99)
HCT VFR BLD AUTO: 27.3 % (ref 42–52)
HGB BLD-MCNC: 8.5 G/DL (ref 14–18)
HYPOCHROMIA BLD QL SMEAR: ABNORMAL
LYMPHOCYTES # BLD AUTO: 1.23 K/UL (ref 1–4.8)
LYMPHOCYTES NFR BLD: 25.2 % (ref 22–41)
MACROCYTES BLD QL SMEAR: ABNORMAL
MANUAL DIFF BLD: NORMAL
MCH RBC QN AUTO: 32.1 PG (ref 27–33)
MCHC RBC AUTO-ENTMCNC: 31.1 G/DL (ref 33.7–35.3)
MCV RBC AUTO: 103 FL (ref 81.4–97.8)
MICROCYTES BLD QL SMEAR: ABNORMAL
MONOCYTES # BLD AUTO: 1.58 K/UL (ref 0–0.85)
MONOCYTES NFR BLD AUTO: 32.2 % (ref 0–13.4)
MORPHOLOGY BLD-IMP: NORMAL
NEUTROPHILS # BLD AUTO: 2.05 K/UL (ref 1.82–7.42)
NEUTROPHILS NFR BLD: 40.9 % (ref 44–72)
NEUTS BAND NFR BLD MANUAL: 0.9 % (ref 0–10)
NRBC # BLD AUTO: 0.04 K/UL
NRBC BLD-RTO: 0.8 /100 WBC
OVALOCYTES BLD QL SMEAR: NORMAL
PLATELET # BLD AUTO: 63 K/UL (ref 164–446)
PLATELET BLD QL SMEAR: NORMAL
PMV BLD AUTO: 10.5 FL (ref 9–12.9)
POLYCHROMASIA BLD QL SMEAR: NORMAL
POTASSIUM SERPL-SCNC: 4.4 MMOL/L (ref 3.6–5.5)
RBC # BLD AUTO: 2.65 M/UL (ref 4.7–6.1)
RBC BLD AUTO: PRESENT
SODIUM SERPL-SCNC: 137 MMOL/L (ref 135–145)
TARGETS BLD QL SMEAR: NORMAL
VIT B12 SERPL-MCNC: 1613 PG/ML (ref 211–911)
WBC # BLD AUTO: 4.9 K/UL (ref 4.8–10.8)

## 2021-01-04 PROCEDURE — 99232 SBSQ HOSP IP/OBS MODERATE 35: CPT | Mod: GC | Performed by: HOSPITALIST

## 2021-01-04 PROCEDURE — 85027 COMPLETE CBC AUTOMATED: CPT

## 2021-01-04 PROCEDURE — 700102 HCHG RX REV CODE 250 W/ 637 OVERRIDE(OP): Performed by: STUDENT IN AN ORGANIZED HEALTH CARE EDUCATION/TRAINING PROGRAM

## 2021-01-04 PROCEDURE — A9270 NON-COVERED ITEM OR SERVICE: HCPCS | Performed by: STUDENT IN AN ORGANIZED HEALTH CARE EDUCATION/TRAINING PROGRAM

## 2021-01-04 PROCEDURE — 36415 COLL VENOUS BLD VENIPUNCTURE: CPT

## 2021-01-04 PROCEDURE — 80048 BASIC METABOLIC PNL TOTAL CA: CPT

## 2021-01-04 PROCEDURE — 700111 HCHG RX REV CODE 636 W/ 250 OVERRIDE (IP): Performed by: HOSPITALIST

## 2021-01-04 PROCEDURE — 700105 HCHG RX REV CODE 258: Performed by: HOSPITALIST

## 2021-01-04 PROCEDURE — 700101 HCHG RX REV CODE 250: Performed by: STUDENT IN AN ORGANIZED HEALTH CARE EDUCATION/TRAINING PROGRAM

## 2021-01-04 PROCEDURE — 770006 HCHG ROOM/CARE - MED/SURG/GYN SEMI*

## 2021-01-04 PROCEDURE — A9270 NON-COVERED ITEM OR SERVICE: HCPCS | Performed by: HOSPITALIST

## 2021-01-04 PROCEDURE — 71046 X-RAY EXAM CHEST 2 VIEWS: CPT

## 2021-01-04 PROCEDURE — 85007 BL SMEAR W/DIFF WBC COUNT: CPT

## 2021-01-04 PROCEDURE — 82607 VITAMIN B-12: CPT

## 2021-01-04 PROCEDURE — 82746 ASSAY OF FOLIC ACID SERUM: CPT

## 2021-01-04 PROCEDURE — 700102 HCHG RX REV CODE 250 W/ 637 OVERRIDE(OP): Performed by: HOSPITALIST

## 2021-01-04 PROCEDURE — 700111 HCHG RX REV CODE 636 W/ 250 OVERRIDE (IP): Performed by: STUDENT IN AN ORGANIZED HEALTH CARE EDUCATION/TRAINING PROGRAM

## 2021-01-04 RX ORDER — BUMETANIDE 1 MG/1
1 TABLET ORAL
Status: DISCONTINUED | OUTPATIENT
Start: 2021-01-05 | End: 2021-01-13 | Stop reason: HOSPADM

## 2021-01-04 RX ORDER — SPIRONOLACTONE 25 MG/1
25 TABLET ORAL
Status: DISCONTINUED | OUTPATIENT
Start: 2021-01-04 | End: 2021-01-13 | Stop reason: HOSPADM

## 2021-01-04 RX ORDER — FUROSEMIDE 10 MG/ML
20 INJECTION INTRAMUSCULAR; INTRAVENOUS ONCE
Status: COMPLETED | OUTPATIENT
Start: 2021-01-04 | End: 2021-01-04

## 2021-01-04 RX ORDER — ACETAMINOPHEN 500 MG
1000 TABLET ORAL 3 TIMES DAILY
Status: COMPLETED | OUTPATIENT
Start: 2021-01-04 | End: 2021-01-09

## 2021-01-04 RX ADMIN — ACETAMINOPHEN 1000 MG: 500 TABLET ORAL at 04:08

## 2021-01-04 RX ADMIN — LIDOCAINE 1 PATCH: 50 PATCH TOPICAL at 15:16

## 2021-01-04 RX ADMIN — LOSARTAN POTASSIUM 25 MG: 25 TABLET, FILM COATED ORAL at 04:11

## 2021-01-04 RX ADMIN — HEPARIN SODIUM 5000 UNITS: 5000 INJECTION, SOLUTION INTRAVENOUS; SUBCUTANEOUS at 04:15

## 2021-01-04 RX ADMIN — CARVEDILOL 3.12 MG: 3.12 TABLET, FILM COATED ORAL at 17:15

## 2021-01-04 RX ADMIN — CEFTRIAXONE SODIUM 2 G: 2 INJECTION, POWDER, FOR SOLUTION INTRAMUSCULAR; INTRAVENOUS at 08:43

## 2021-01-04 RX ADMIN — ISOSORBIDE MONONITRATE 30 MG: 30 TABLET, EXTENDED RELEASE ORAL at 04:11

## 2021-01-04 RX ADMIN — ALLOPURINOL 150 MG: 300 TABLET ORAL at 04:08

## 2021-01-04 RX ADMIN — HEPARIN SODIUM 5000 UNITS: 5000 INJECTION, SOLUTION INTRAVENOUS; SUBCUTANEOUS at 17:14

## 2021-01-04 RX ADMIN — OXYCODONE HYDROCHLORIDE 10 MG: 10 TABLET ORAL at 08:05

## 2021-01-04 RX ADMIN — FUROSEMIDE 20 MG: 10 INJECTION, SOLUTION INTRAMUSCULAR; INTRAVENOUS at 11:09

## 2021-01-04 RX ADMIN — TAMSULOSIN HYDROCHLORIDE 0.4 MG: 0.4 CAPSULE ORAL at 04:08

## 2021-01-04 RX ADMIN — OXYCODONE HYDROCHLORIDE 10 MG: 10 TABLET ORAL at 17:15

## 2021-01-04 RX ADMIN — ACETAMINOPHEN 1000 MG: 500 TABLET ORAL at 11:09

## 2021-01-04 RX ADMIN — CARVEDILOL 3.12 MG: 3.12 TABLET, FILM COATED ORAL at 04:11

## 2021-01-04 RX ADMIN — ACETAMINOPHEN 1000 MG: 500 TABLET ORAL at 17:15

## 2021-01-04 RX ADMIN — OXYCODONE HYDROCHLORIDE 10 MG: 10 TABLET ORAL at 23:52

## 2021-01-04 ASSESSMENT — ENCOUNTER SYMPTOMS
HEMOPTYSIS: 0
PALPITATIONS: 0
CHILLS: 0
DOUBLE VISION: 0
COUGH: 0
ABDOMINAL PAIN: 0
FOCAL WEAKNESS: 0
DIZZINESS: 0
CONSTIPATION: 0
WHEEZING: 0
NAUSEA: 0
BLURRED VISION: 0
BRUISES/BLEEDS EASILY: 0
FEVER: 0
VOMITING: 0
NECK PAIN: 0
SORE THROAT: 0
HEARTBURN: 0
BACK PAIN: 0
HEADACHES: 0
MYALGIAS: 0
POLYDIPSIA: 0
WEAKNESS: 0

## 2021-01-04 ASSESSMENT — FIBROSIS 4 INDEX: FIB4 SCORE: 5.9

## 2021-01-04 ASSESSMENT — PAIN DESCRIPTION - PAIN TYPE: TYPE: ACUTE PAIN

## 2021-01-04 NOTE — PROGRESS NOTES
Pt refusing to wear mask or wear pulse ox to keep track of his oxygen when he sleeps. He was titrated down to room air during the day, but I am suspicious he destats when he sleeps. Managed to convince him to wear 3L nasal cannula when he sleeps. As he refused to wear the mask or wear a pulse ox because he wasn't able to sleep.

## 2021-01-04 NOTE — PROGRESS NOTES
Cross Coverage Note:     S: 86yo M with pmhx of Permanent Afib, HFrEF - EF 20% s/p AICD, TR/MR, CMML, prostate Ca s/p radiation admitted on 12/27/20 due to acute hypoxic respiratory failure. Received page regarding patient's left hip pain. Per patient, s/p left hip replacement 12 years ago, states pain has been chronic but over last few days, it has increased. States movement and tension make pain better, but rest makes it worse. Had tylenol 500mg, Oxycodone IR 10mg  Ordered. He also received Morphine 3mg given for this pain over the last 24hrs. Patient reports that he take Oxycodone 15mg every 3 hours at home for this pain.     Exam:  Gen: A&Ox3, in NAD, eating dinner and texting comfortably in bed as observed from door, yelling in pain after introducing myself.  Lungs: CTAB, no wheezes, rales or ronchi  Abd: soft, nontender, no guarding, no rebound  Extremities: Bilateral 2+ Pitting edema extending to upper thigh L>R with brawny induration to shins. Unable to appreciate DP/PT pulse given edema. Generalized TTP to left leg. NO bruising/bleeding to left leg.     A/P:   85 year old male with chronic hip pain on long-term opioid therapy with left hip pain, similar to his chronic pain. Reports opiates are much less than what is given at home. Chart review shows plan to wean off his regimen as likely to contribute to his admitting diagnosis of acute hypoxic respiratory failure. DDx includes bruising/bleeding given his CMML, worsening infection given bacteremia, DVT given immobilization, active possible malignancy, or referred pain. However, his vital signs have remained stable, there are no other symptoms other than pain, exam is fairly benign, he is on DVT prophylaxis and antibiotics making uncontrolled chronic pain most likely. Day team to re-assess pain regimen, confirm home regimen with pharmacy.     Plan:  - Tylenol 1000mg TID, LFTs have normalized since admission  - NO NSAIDS given JC   - Lidoderm Patch   -  Increased Morphine to 1mg Q4H PRN  - Given worsening edema, per RN, one dose of IV lasix 20mg ordered. Given resolving JC and no respiratory complaints, day team to re-address diuretics in AM.

## 2021-01-04 NOTE — PROGRESS NOTES
Daily Progress Note:     Date of Service: 1/4/2021  Primary Team: [facility] IM Orange Team   Attending: NITA Wheat M.D.   Senior Resident: Dr. Hooks  Intern: Dr. Benjamin  Contact:  [phone]    Chief Complaint:   Weakness with shortness of breath    Subjective:   Interval Updates: [facility] L hip pain overnight, unclear cause, opposite side from biopsy. Has chronic pain in hips and was on larger dose of oxycodone more frequently as outpatient. Resolved this AM. O2 requirement increased to 3.5L overnight from 2L.    See above overnight complaint. This AM, feeling much better and denies significant pain to either hip. Feels comfortable and [facility]. Denies fevers, chills, chest pain, abdominal pain, worsening shortness of breath, nausea, vomiting, focal weakness.     Consultants/Specialty:  N/a    Review of Systems:   Review of Systems   Constitutional: Negative for chills and fever.   HENT: Negative for hearing loss and sore throat.    Eyes: Negative for blurred vision and double vision.   Respiratory: Negative for cough, hemoptysis and wheezing.    Cardiovascular: Positive for leg swelling. Negative for chest pain and palpitations.   Gastrointestinal: Negative for abdominal pain, constipation, heartburn, nausea and vomiting.   Genitourinary: Negative for dysuria, frequency and urgency.   Musculoskeletal: Negative for back pain, myalgias and neck pain.   Neurological: Negative for dizziness, focal weakness, weakness and headaches.   Endo/Heme/Allergies: Negative for polydipsia. Does not bruise/bleed easily.       Objective Data:   Physical Exam:   Vitals:   Temp:  [35.9 °C (96.7 °F)-36.4 °C (97.6 °F)] 36.1 °C (96.9 °F)  Pulse:  [70-85] 71  Resp:  [17-20] 20  BP: ()/(55-96) 88/55  SpO2:  [90 %-93 %] 91 %    Physical Exam  Constitutional:       General: He is not in acute distress.     Appearance: Normal appearance.   HENT:      Head: Normocephalic  and atraumatic.      Right Ear: External ear normal.      Left Ear: External ear normal.      Nose: Nose normal. No congestion or rhinorrhea.      Mouth/Throat:      Mouth: Mucous membranes are moist.      Pharynx: Oropharynx is clear. No oropharyngeal exudate.   Eyes:      General: No scleral icterus.     Extraocular Movements: Extraocular movements intact.      Conjunctiva/sclera: Conjunctivae normal.      Pupils: Pupils are equal, round, and reactive to light.   Neck:      Musculoskeletal: Normal range of motion and neck supple.   Cardiovascular:      Rate and Rhythm: Normal rate and regular rhythm.      Pulses: Normal pulses.   Pulmonary:      Effort: Pulmonary effort is normal. No respiratory distress.      Breath sounds: Normal breath sounds. No wheezing or rales.   Abdominal:      General: Abdomen is flat. There is no distension.      Palpations: Abdomen is soft.      Tenderness: There is no abdominal tenderness. There is no guarding.   Musculoskeletal:         General: No swelling.      Right lower leg: Edema present.      Left lower leg: Edema present.      Comments: R toe appears dry and clean   Skin:     General: Skin is warm and dry.      Capillary Refill: Capillary refill takes less than 2 seconds.   Neurological:      General: No focal deficit present.      Mental Status: He is alert and oriented to person, place, and time.      Cranial Nerves: No cranial nerve deficit.         Labs:   Recent Labs     01/02/21 0424 01/03/21  0616 01/04/21  0339   CREATININE 1.92* 1.83* 1.93*     Recent Labs     01/02/21 0424 01/03/21  0616 01/04/21  0339   SODIUM 133* 133* 137   POTASSIUM 4.4 4.5 4.4   CHLORIDE 105 104 107   CO2 13* 15* 16*   GLUCOSE 126* 93 104*   BUN 44* 43* 45*     Recent Labs     01/02/21 0424 01/03/21  0616 01/04/21  0339   WBC 7.3 5.3 4.9   RBC 3.20* 3.15* 2.65*   HEMOGLOBIN 10.0* 10.0* 8.5*   HEMATOCRIT 32.9* 32.3* 27.3*   .8* 102.5* 103.0*   MCH 31.3 31.7 32.1   RDW 83.5* 86.9* 84.7*    PLATELETCT 55* 56* 63*   MPV 10.5 10.7 10.5   NEUTSPOLYS  --  34.00* 40.90*   LYMPHOCYTES  --  42.00* 25.20   MONOCYTES  --  22.00* 32.20*   EOSINOPHILS  --  0.00 0.90   BASOPHILS  --  0.00 0.00   RBCMORPHOLO  --  Present Present     Hgb 10->8.5 overnight    Imaging:   DX-CHEST-2 VIEWS   Final Result      1.  Low lung volumes   2.  Persistently enlarged cardiac silhouette   3.  Pulmonary edema and central vascular congestion   4.  Probable LEFT pleural effusion      DX-CHEST-PORTABLE (1 VIEW)   Final Result      No significant change from prior exam.      DX-TOE(S) 2+ RIGHT   Final Result      1.  Hallux valgus with bunion formation and severe degenerative change of RIGHT 1st metatarsophalangeal joint.   2.  No fracture or gross focal bony destruction.      US-RUQ   Final Result      Cholelithiasis with borderline gallbladder wall thickening which may related to right heart failure (versus cholecystitis) as there is evidence of pleural fluid and ascites with bidirectional flow in the main portal vein.      Echogenic right kidney with renal cortical thinning consistent with medical renal disease.      EC-ECHOCARDIOGRAM COMPLETE W/O CONT   Final Result      NM-LUNG VENT/PERF IMAGING   Final Result      Low probability VQ scan.      US-EXTREMITY VENOUS LOWER BILAT   Final Result      DX-CHEST-PORTABLE (1 VIEW)   Final Result      1.  Cardiomegaly.      2.  Bibasilar atelectasis.        Problem Representation:     Mr. Ortiz is a 85 year-old male with past medical history of HFrEF 20% 12/2020, Afib s/p AICD, pulmonary hypertension, tricuspid and mitral regurgitation s/p mitral valve clipping, and now CMML who presents with shortness of breath and weakness for 3 days as well as lower extremity swelling. He was found to have CMML this hospitalization which [facility]     * Acute respiratory failure with hypoxia (HCC)- (present on admission)  Assessment & Plan  US DVTs negative, V/Q  scan negative.  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  Pending cytogenetics to risk stratify which will determine if chemotherapy will be beneficial  -F/u cytogenetics   -[facility]    Acute on chronic congestive heart failure (HCC)- (present on admission)  Assessment & Plan  Echocardiogram 12/27 -LVEF 25%, dilated right ventricle, severe tricuspid regurgitation  -Carvedilol 3.125 mg BID  -Imdur 30 mg BID  -Losartan 25 mg  -Resume bumetanide as above with holding parameters  -Resume spironolactone as above with holding parameters  -Lasix 20mg IV once  PCV13 ordered per core measures    Monocytosis- (present on admission)  Assessment & Plan  Discussed peripheral smear with Dr. Cuello  - marked monocytosis with erythroblasts/pRBCs, bone marrow biopsy recommended to exclude leukemia, myeloproliferative disorders  - 43.70% monocytes on 12/29 AM CBC  -Bone marrow biopsy complete, consistent with CMML.    Ulcer of great toe, right, with unspecified severity (HCC)- (present on admission)  Assessment & Plan  Ulcer appears clean and dry, no bone penetration. Degenerative changes on x-ray but not correlated to area of ulcer, no osteomyelitis.  -No further wound care needs    JC (acute kidney injury) (HCC)- (present on admission)  Assessment & Plan  Initially Cr elevated to 2.8, now downtrended to 1.93 which is likely near his baseline.  -Continue to trend while diuresing    Chronic atrial fibrillation (HCC)- (present on  admission)  Assessment & Plan  AICD for heart failure in place  Continue home medications, not on anticoagulation per records review, likely due to hx easy bleeding and CML  -Carvedilol as above    Tricuspid regurgitation- (present on admission)  Assessment & Plan  Echo 12/27 showed severe tricuspid regurgitation  Recommend to to establish with cardiology here in [place]  Discussed with cardiology, tricuspid clips are not done in [place] and are referred to  [place] on outpatient basis    Mitral regurgitation- (present on admission)  Assessment & Plan  History of, s/p mitral valve clip in 10/2020  Echocardiogram 12/27 showed mild mitral stenosis    Bacteremia due to Escherichia coli  Assessment & Plan  IV ceftriaxone and doxycycline for 7 days, last day 1/2/2021  -Resolved    BPH (benign prostatic hyperplasia)- (present on admission)  Assessment & Plan  Continue home tamsulosin    Elevated liver enzymes- (present on admission)  Assessment & Plan  AST/ALT normal in 11/2020 at [facility] [place], t bili was 2.4; AST//218, T bili 3.6 admission  History of heavy drinking per patient, none now per his report -though patient reports 12 to 24 pack use  Hepatitis panel negative  US liver ordered - cholelithiasis with borderline gallbladder wall thickening, right heart failure (versus cholecystitis) , echogenic right kidney with renal cortical thinning   -Outpatient GI follow-up  -Abstinence from alcohol      Code Status: DNR  Diet: Cardiac  Lines/Tubes/Drains: PIVs  IVF: none  Prophylaxis:   DVT: heparin  GI: omeprazole  Disposition: [facility] pending acceptance

## 2021-01-04 NOTE — PROGRESS NOTES
Assumed care of pt @ 1915, bedside report received from JUSTO Bauer.  Pt A&OX4 and has strong intermittent pain in the L hip. Family at bedside. Bed locked and lowered with call light within reach and questions answered during present time.

## 2021-01-05 LAB
ABO + RH BLD: NORMAL
ABO GROUP BLD: NORMAL
ANION GAP SERPL CALC-SCNC: 11 MMOL/L (ref 7–16)
ANISOCYTOSIS BLD QL SMEAR: ABNORMAL
BASOPHILS # BLD AUTO: 0 % (ref 0–1.8)
BASOPHILS # BLD: 0 K/UL (ref 0–0.12)
BLD GP AB SCN SERPL QL: NORMAL
BUN SERPL-MCNC: 43 MG/DL (ref 8–22)
BURR CELLS BLD QL SMEAR: NORMAL
CALCIUM SERPL-MCNC: 8.7 MG/DL (ref 8.5–10.5)
CHLORIDE SERPL-SCNC: 103 MMOL/L (ref 96–112)
CO2 SERPL-SCNC: 18 MMOL/L (ref 20–33)
CREAT SERPL-MCNC: 1.79 MG/DL (ref 0.5–1.4)
EOSINOPHIL # BLD AUTO: 0 K/UL (ref 0–0.51)
EOSINOPHIL NFR BLD: 0 % (ref 0–6.9)
ERYTHROCYTE [DISTWIDTH] IN BLOOD BY AUTOMATED COUNT: 80.2 FL (ref 35.9–50)
ERYTHROCYTE [DISTWIDTH] IN BLOOD BY AUTOMATED COUNT: 81.6 FL (ref 35.9–50)
ERYTHROCYTE [DISTWIDTH] IN BLOOD BY AUTOMATED COUNT: 83.1 FL (ref 35.9–50)
GLUCOSE SERPL-MCNC: 90 MG/DL (ref 65–99)
HCT VFR BLD AUTO: 24.9 % (ref 42–52)
HCT VFR BLD AUTO: 25 % (ref 42–52)
HCT VFR BLD AUTO: 25.3 % (ref 42–52)
HGB BLD-MCNC: 7.8 G/DL (ref 14–18)
HYPOCHROMIA BLD QL SMEAR: ABNORMAL
LYMPHOCYTES # BLD AUTO: 0.92 K/UL (ref 1–4.8)
LYMPHOCYTES NFR BLD: 13 % (ref 22–41)
MACROCYTES BLD QL SMEAR: ABNORMAL
MAGNESIUM SERPL-MCNC: 1.7 MG/DL (ref 1.5–2.5)
MANUAL DIFF BLD: NORMAL
MCH RBC QN AUTO: 31.1 PG (ref 27–33)
MCH RBC QN AUTO: 31.5 PG (ref 27–33)
MCH RBC QN AUTO: 31.5 PG (ref 27–33)
MCHC RBC AUTO-ENTMCNC: 30.8 G/DL (ref 33.7–35.3)
MCHC RBC AUTO-ENTMCNC: 31.2 G/DL (ref 33.7–35.3)
MCHC RBC AUTO-ENTMCNC: 31.3 G/DL (ref 33.7–35.3)
MCV RBC AUTO: 100.4 FL (ref 81.4–97.8)
MCV RBC AUTO: 100.8 FL (ref 81.4–97.8)
MCV RBC AUTO: 100.8 FL (ref 81.4–97.8)
METAMYELOCYTES NFR BLD MANUAL: 0.9 %
MONOCYTES # BLD AUTO: 3.02 K/UL (ref 0–0.85)
MONOCYTES NFR BLD AUTO: 42.6 % (ref 0–13.4)
MORPHOLOGY BLD-IMP: NORMAL
MYELOCYTES NFR BLD MANUAL: 1.7 %
NEUTROPHILS # BLD AUTO: 2.96 K/UL (ref 1.82–7.42)
NEUTROPHILS NFR BLD: 41.7 % (ref 44–72)
NRBC # BLD AUTO: 0.07 K/UL
NRBC BLD-RTO: 1 /100 WBC
PLATELET # BLD AUTO: 64 K/UL (ref 164–446)
PLATELET # BLD AUTO: 65 K/UL (ref 164–446)
PLATELET # BLD AUTO: 73 K/UL (ref 164–446)
PLATELET BLD QL SMEAR: NORMAL
PMV BLD AUTO: 10.3 FL (ref 9–12.9)
PMV BLD AUTO: 10.4 FL (ref 9–12.9)
PMV BLD AUTO: 10.5 FL (ref 9–12.9)
POIKILOCYTOSIS BLD QL SMEAR: NORMAL
POTASSIUM SERPL-SCNC: 4.2 MMOL/L (ref 3.6–5.5)
RBC # BLD AUTO: 2.48 M/UL (ref 4.7–6.1)
RBC # BLD AUTO: 2.48 M/UL (ref 4.7–6.1)
RBC # BLD AUTO: 2.51 M/UL (ref 4.7–6.1)
RBC BLD AUTO: PRESENT
RH BLD: NORMAL
SODIUM SERPL-SCNC: 132 MMOL/L (ref 135–145)
WBC # BLD AUTO: 6.9 K/UL (ref 4.8–10.8)
WBC # BLD AUTO: 7.1 K/UL (ref 4.8–10.8)
WBC # BLD AUTO: 7.4 K/UL (ref 4.8–10.8)

## 2021-01-05 PROCEDURE — 700102 HCHG RX REV CODE 250 W/ 637 OVERRIDE(OP): Performed by: INTERNAL MEDICINE

## 2021-01-05 PROCEDURE — 700102 HCHG RX REV CODE 250 W/ 637 OVERRIDE(OP): Performed by: STUDENT IN AN ORGANIZED HEALTH CARE EDUCATION/TRAINING PROGRAM

## 2021-01-05 PROCEDURE — 36415 COLL VENOUS BLD VENIPUNCTURE: CPT

## 2021-01-05 PROCEDURE — 86901 BLOOD TYPING SEROLOGIC RH(D): CPT

## 2021-01-05 PROCEDURE — 99232 SBSQ HOSP IP/OBS MODERATE 35: CPT | Performed by: HOSPITALIST

## 2021-01-05 PROCEDURE — 86850 RBC ANTIBODY SCREEN: CPT

## 2021-01-05 PROCEDURE — A9270 NON-COVERED ITEM OR SERVICE: HCPCS | Performed by: STUDENT IN AN ORGANIZED HEALTH CARE EDUCATION/TRAINING PROGRAM

## 2021-01-05 PROCEDURE — 770006 HCHG ROOM/CARE - MED/SURG/GYN SEMI*

## 2021-01-05 PROCEDURE — A9270 NON-COVERED ITEM OR SERVICE: HCPCS | Performed by: HOSPITALIST

## 2021-01-05 PROCEDURE — 85027 COMPLETE CBC AUTOMATED: CPT | Mod: 91

## 2021-01-05 PROCEDURE — 85007 BL SMEAR W/DIFF WBC COUNT: CPT

## 2021-01-05 PROCEDURE — 83735 ASSAY OF MAGNESIUM: CPT

## 2021-01-05 PROCEDURE — A9270 NON-COVERED ITEM OR SERVICE: HCPCS | Performed by: INTERNAL MEDICINE

## 2021-01-05 PROCEDURE — 80048 BASIC METABOLIC PNL TOTAL CA: CPT

## 2021-01-05 PROCEDURE — 700102 HCHG RX REV CODE 250 W/ 637 OVERRIDE(OP): Performed by: HOSPITALIST

## 2021-01-05 PROCEDURE — 86900 BLOOD TYPING SEROLOGIC ABO: CPT

## 2021-01-05 PROCEDURE — 700111 HCHG RX REV CODE 636 W/ 250 OVERRIDE (IP): Performed by: HOSPITALIST

## 2021-01-05 RX ADMIN — CARVEDILOL 3.12 MG: 3.12 TABLET, FILM COATED ORAL at 06:14

## 2021-01-05 RX ADMIN — ALLOPURINOL 150 MG: 300 TABLET ORAL at 06:13

## 2021-01-05 RX ADMIN — OXYCODONE HYDROCHLORIDE 10 MG: 10 TABLET ORAL at 14:55

## 2021-01-05 RX ADMIN — OXYCODONE HYDROCHLORIDE 10 MG: 10 TABLET ORAL at 04:36

## 2021-01-05 RX ADMIN — BUMETANIDE 1 MG: 1 TABLET ORAL at 06:14

## 2021-01-05 RX ADMIN — DOCUSATE SODIUM 50 MG AND SENNOSIDES 8.6 MG 2 TABLET: 8.6; 5 TABLET, FILM COATED ORAL at 06:14

## 2021-01-05 RX ADMIN — ACETAMINOPHEN 1000 MG: 500 TABLET ORAL at 13:04

## 2021-01-05 RX ADMIN — ACETAMINOPHEN 1000 MG: 500 TABLET ORAL at 17:31

## 2021-01-05 RX ADMIN — TAMSULOSIN HYDROCHLORIDE 0.4 MG: 0.4 CAPSULE ORAL at 06:14

## 2021-01-05 RX ADMIN — DOCUSATE SODIUM 50 MG AND SENNOSIDES 8.6 MG 2 TABLET: 8.6; 5 TABLET, FILM COATED ORAL at 17:31

## 2021-01-05 RX ADMIN — OXYCODONE HYDROCHLORIDE 10 MG: 10 TABLET ORAL at 22:05

## 2021-01-05 RX ADMIN — ACETAMINOPHEN 1000 MG: 500 TABLET ORAL at 06:14

## 2021-01-05 RX ADMIN — HEPARIN SODIUM 5000 UNITS: 5000 INJECTION, SOLUTION INTRAVENOUS; SUBCUTANEOUS at 06:13

## 2021-01-05 RX ADMIN — HEPARIN SODIUM 5000 UNITS: 5000 INJECTION, SOLUTION INTRAVENOUS; SUBCUTANEOUS at 17:31

## 2021-01-05 RX ADMIN — LOSARTAN POTASSIUM 25 MG: 25 TABLET, FILM COATED ORAL at 06:14

## 2021-01-05 RX ADMIN — ISOSORBIDE MONONITRATE 30 MG: 30 TABLET, EXTENDED RELEASE ORAL at 06:13

## 2021-01-05 RX ADMIN — OXYCODONE HYDROCHLORIDE 10 MG: 10 TABLET ORAL at 08:24

## 2021-01-05 ASSESSMENT — ENCOUNTER SYMPTOMS
EYE DISCHARGE: 0
LOSS OF CONSCIOUSNESS: 0
EYE REDNESS: 0
HEADACHES: 0
BRUISES/BLEEDS EASILY: 0
BLOOD IN STOOL: 0
SORE THROAT: 0
EYE PAIN: 0
SHORTNESS OF BREATH: 0
HALLUCINATIONS: 0
PALPITATIONS: 0
FEVER: 0
STRIDOR: 0
VOMITING: 0
HEMOPTYSIS: 0
FLANK PAIN: 0
FOCAL WEAKNESS: 0
SEIZURES: 0
CHILLS: 0
DIARRHEA: 0
SPEECH CHANGE: 0
MYALGIAS: 1
COUGH: 0
DIZZINESS: 0
NERVOUS/ANXIOUS: 0
ABDOMINAL PAIN: 0
SPUTUM PRODUCTION: 0

## 2021-01-05 ASSESSMENT — COGNITIVE AND FUNCTIONAL STATUS - GENERAL
SUGGESTED CMS G CODE MODIFIER DAILY ACTIVITY: CK
DRESSING REGULAR LOWER BODY CLOTHING: A LOT
TOILETING: A LOT
HELP NEEDED FOR BATHING: A LOT
DAILY ACTIVITIY SCORE: 15
EATING MEALS: A LITTLE
PERSONAL GROOMING: A LITTLE
DRESSING REGULAR UPPER BODY CLOTHING: A LITTLE

## 2021-01-05 ASSESSMENT — PAIN DESCRIPTION - PAIN TYPE
TYPE: ACUTE PAIN

## 2021-01-05 ASSESSMENT — PAIN SCALES - WONG BAKER: WONGBAKER_NUMERICALRESPONSE: HURTS A LITTLE MORE

## 2021-01-05 NOTE — DISCHARGE PLANNING
Agency/Facility Name: [facility]  Outcome: Left vmail for Abhi in admissions. Requested call back.    @[phone]  Agency/Facility Name: [facility]  Spoke To: Abhi  Outcome: pt declined due to not contracted insurance.

## 2021-01-05 NOTE — THERAPY
"Missed Therapy     Patient Name: Stephen Ortiz  Age:  85 y.o., Sex:  male  Medical Record #: [id number]  Today's Date: 1/5/2021    Discussed missed therapy with RN/MD. Attempted this AM, pt resting suggested OT return this PM. Second attempt, pt angry and adamantly refused therapy. Pt angry with MD stating, \"I have not been told about my biopsy report. Left pt and asked MD if he could go in and speak with pt. MD stated he was in this AM and informed pt of report. Pt stated \"MD never came in and that we were treating him (pt) as if he is an idiot.\" Pt pushed OT away, stating, \"I'm not doing anything and to go away\". RN present attempting to encourage pt to participate in therapy. Pt adamantly refused. Will attempt OT POC another day. RN informed and agreed.        01/05/21 1457   Cognition    Cognition / Consciousness WDL   Level of Consciousness Alert   Comments Pt not cooperative today. Attempted X2 . Angry stating that his MD has not been in to tell him if he has CA or not. Pt raised his voice and his arm . Adamanty refused therapy angry!    Passive ROM Upper Body   Comments WFL   Active ROM Upper Body   Dominant Hand Right   Comments edema in LUE continues.    Strength Upper Body   Gross Strength Generalized Weakness, Equal Bilaterally.    Other Treatments   Other Treatments Provided Attempted Psychosocial intervention which was rejected by patient. Pt extremely angry due to not being informed about his biopsy report. Left pt and asked MD if he could inform pt. MD stated that he was in this AM to see pt and told pt the results and about his leukemia . Pt stated, \"He did not come in to see me\". Pt became extremely angry stating \"I'm not an idot\". Why are you treating me like I am an idiot!\" Informed pt that he was not an idiot and that at times the pain meds play with the brain and can have memory issues. Pt adamantly refused any therapy, pushing his arms against OT.  RN present and encouraged pt to " "participate but pt angry at all staff stating , \"I'm not doing anything!\" .     Balance   Comments refused all treatment.    Bed Mobility    Comments Refused   Activities of Daily Living   Comments Refused all treatment.    Functional Mobility   Comments refused all treatment.    Patient / Family Goals   Patient / Family Goal #1 to go home   Goal #1 Outcome Goal not met   Short Term Goals   Short Term Goal # 1 pt will demo toilet txf with supv   Goal Outcome # 1 Progressing as expected  (as of last OT session. )   Short Term Goal # 2 pt will dress LB with supv and AE prn   Goal Outcome # 2 Goal not met   Short Term Goal # 3 pt will groom in stance w/ supv   Goal Outcome # 3 Progressing as expected  (as of last OT session. )   Anticipated Discharge Equipment and Recommendations   DC Equipment Recommendations Unable to determine at this time   Discharge Recommendations Recommend post-acute placement for additional occupational therapy services prior to discharge home   Interdisciplinary Plan of Care Collaboration   IDT Collaboration with  Nursing;Physician   Collaboration Comments  RN/MD  informed.Attempted 1/5. Pt adamantly refused angry with MD and all staff. Last seen    Session Information   Date / Session Number  Attempted 1/5. Pt adamanly refused. See note above. last seen 1/1 #2 (2/3, 1/4)     "

## 2021-01-05 NOTE — PROGRESS NOTES
· 2 RN skin check completed with Jaime KEMP.  · Devices in place underwear, socks, PIV in right FA.  · Skin assessed under devices YES.  · Confirmed pressure ulcers found on N/A.  · New potential pressure ulcers noted on N/A. Wound consult placed? N/A. Photo uploaded? YES.   · The following interventions are in place Pillows in place for positioning and support, waffle overlay, Mepilex heels bilaterally, grey nasal cannula tubing foam. Skin is generally swollen and bruised. Major bruising on RLQ and LLQ of trunk, mid lower back, and BUE. BLE dusky, dry, flaky, and very painful. LUE has 2+ pitting edema and is weeping, also painful. Sacrum is pink, red, and blanching. Right big toe has wound that is black, pink, red, and open to air. Bilateral heels and elbows are pink and blanching.

## 2021-01-05 NOTE — PROGRESS NOTES
Assumed care of pt @ 1915, bedside report received from JUSTO Pacheco.  Pt A&OX4 and has pain in the hip.  Bed locked and lowered with call light within reach and questions answered during present time.

## 2021-01-05 NOTE — PROGRESS NOTES
[facility] Medicine Daily Progress Note    Date of Service  1/5/2021    Chief Complaint  85 y.o. male admitted 12/27/2020 with shortness of breath    Hospital Course  85 years old male with a past medical history of heart failure with reduced ejection fraction, atrial fibrillation, CML, prostate cancer s/p radiation admitted 12/23 with shortness of breath.  He was found to have acute hypoxemic respiratory failure saturating in the mid 80s at presentation.  Patient was diuresed with improvement of respiratory status.  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  [facility]     Interval Problem Update  Heart rate 70s-80s, requiring 1 L of oxygen to achieve adequate saturation, encourage incentive spirometry.  Blood pressure 90s over 50s, denies dizziness or lightheadedness  Continue carvedilol and diuretics with hold parameters.  Hemoglobin has been trending down, 10, down to 8.5, down to 7.8.  There is no evidence for gross bleeding, continue to monitor and transfuse for hemoglobin of less than or equal to 7.    Platelets 65, continue to monitor, consider holding heparin if platelets drop below 50,000 or if there is evidence of gross bleeding.   Patient is weak, will need placement, discussed with care coordination.  Discussed with patient, patient's nurse and with multidisciplinary team during rounds including , pharmacist and charge nurse.      Consultants/Specialty  None    Code Status  DNAR/DNI    Disposition  Needs SNF, [facility]  If his insurance [facility] accepted].    Review of Systems  Review of Systems   Constitutional: Positive for malaise/fatigue. Negative for chills and fever.   HENT: Negative for congestion and sore throat.    Eyes: Negative for pain, discharge and redness.   Respiratory: Negative for cough, hemoptysis, sputum production, shortness of breath and stridor.     Cardiovascular: Positive for leg swelling. Negative for chest pain and palpitations.   Gastrointestinal: Negative for abdominal pain, blood in stool, diarrhea and vomiting.   Genitourinary: Negative for flank pain, hematuria and urgency.   Musculoskeletal: Positive for myalgias.   Skin: Negative.    Neurological: Negative for dizziness, speech change, focal weakness, seizures, loss of consciousness and headaches.   Endo/Heme/Allergies: Does not bruise/bleed easily.   Psychiatric/Behavioral: Negative for hallucinations and suicidal ideas. The patient is not nervous/anxious.       Physical Exam  Temp:  [36.1 °C (96.9 °F)-36.8 °C (98.2 °F)] 36.7 °C (98.1 °F)  Pulse:  [69-82] 81  Resp:  [16-20] 19  BP: ()/(50-86) 122/86  SpO2:  [90 %-92 %] 91 %    Physical Exam  Constitutional:       General: He is not in acute distress.     Appearance: He is not ill-appearing.   HENT:      Head: Normocephalic and atraumatic.      Right Ear: External ear normal.      Left Ear: External ear normal.      Mouth/Throat:      Mouth: Mucous membranes are moist.      Pharynx: No oropharyngeal exudate or posterior oropharyngeal erythema.   Eyes:      General: Scleral icterus present.      Extraocular Movements: Extraocular movements intact.      Pupils: Pupils are equal, round, and reactive to light.   Neck:      Musculoskeletal: Normal range of motion and neck supple.   Cardiovascular:      Rate and Rhythm: Regular rhythm. Tachycardia present.      Pulses: Normal pulses.      Heart sounds: Murmur present.   Pulmonary:      Effort: No respiratory distress.      Breath sounds: Rhonchi (Diffuse) present. No wheezing or rales.      Comments: Reduced air entry bilaterally basally  Abdominal:      General: Bowel sounds are normal. There is no distension.      Palpations: Abdomen is soft.      Tenderness: There is no abdominal tenderness. There is no guarding.   Musculoskeletal:         General: No swelling or tenderness.      Right lower  leg: Edema present.      Left lower leg: Edema present.   Skin:     General: Skin is warm and dry.      Capillary Refill: Capillary refill takes 2 to 3 seconds.      Coloration: Skin is jaundiced and pale.      Findings: Bruising present.   Neurological:      General: No focal deficit present.      Mental Status: He is oriented to person, place, and time.      Sensory: No sensory deficit.      Motor: No weakness.   Psychiatric:         Mood and Affect: Mood normal.         Behavior: Behavior normal.       Fluids    Intake/Output Summary (Last 24 hours) at 1/5/2021 1458  Last data filed at 1/5/2021 0800  Gross per 24 hour   Intake 840 ml   Output --   Net 840 ml     Laboratory  Recent Labs     01/04/21  0339 01/05/21  0237 01/05/21  1148   WBC 4.9 7.1 6.9   RBC 2.65* 2.48* 2.48*   HEMOGLOBIN 8.5* 7.8* 7.8*   HEMATOCRIT 27.3* 24.9* 25.0*   .0* 100.4* 100.8*   MCH 32.1 31.5 31.5   MCHC 31.1* 31.3* 31.2*   RDW 84.7* 80.2* 83.1*   PLATELETCT 63* 64* 65*   MPV 10.5 10.4 10.3     Recent Labs     01/03/21  0616 01/04/21 0339 01/05/21  0237   SODIUM 133* 137 132*   POTASSIUM 4.5 4.4 4.2   CHLORIDE 104 107 103   CO2 15* 16* 18*   GLUCOSE 93 104* 90   BUN 43* 45* 43*   CREATININE 1.83* 1.93* 1.79*   CALCIUM 9.0 8.6 8.7                   Imaging  DX-CHEST-2 VIEWS   Final Result      1.  Low lung volumes   2.  Persistently enlarged cardiac silhouette   3.  Pulmonary edema and central vascular congestion   4.  Probable LEFT pleural effusion      DX-CHEST-PORTABLE (1 VIEW)   Final Result      No significant change from prior exam.      DX-TOE(S) 2+ RIGHT   Final Result      1.  Hallux valgus with bunion formation and severe degenerative change of RIGHT 1st metatarsophalangeal joint.   2.  No fracture or gross focal bony destruction.      US-RUQ   Final Result      Cholelithiasis with borderline gallbladder wall thickening which may related to right heart failure (versus cholecystitis) as there is evidence of pleural fluid  and ascites with bidirectional flow in the main portal vein.      Echogenic right kidney with renal cortical thinning consistent with medical renal disease.      EC-ECHOCARDIOGRAM COMPLETE W/O CONT   Final Result      NM-LUNG VENT/PERF IMAGING   Final Result      Low probability VQ scan.      US-EXTREMITY VENOUS LOWER BILAT   Final Result      DX-CHEST-PORTABLE (1 VIEW)   Final Result      1.  Cardiomegaly.      2.  Bibasilar atelectasis.         Assessment/Plan  * Acute respiratory failure with hypoxia ([facility])- (present on admission)  Assessment & Plan  Secondary to acute on chronic heart failure  Echocardiogram 12/27 -LVEF 25%, dilated right ventricle, severe tricuspid regurgitation  CXR pulmonary edema with possible L pleural effusion  Improving with diuretics  bumetanide, spironolactone with hold parameters.       CMML (chronic myelomonocytic leukemia) ([facility])- (present on admission)  Assessment & Plan  Persistent monocytosis for greater than 3 months  Bone marrow biopsy consistent with CMML-2 with 10 to 19% blasts.  Pending cytogenetics to risk stratify which will determine if chemotherapy will be beneficial  F/u cytogenetics   [facility]    Acute on chronic congestive heart failure (HCC)- (present on admission)  Assessment & Plan  Echocardiogram 12/27 -LVEF 25%, dilated right ventricle, severe tricuspid regurgitation  Resume carvedilol, losartan, spironolactone with hold parameters.     Monocytosis- (present on admission)  Assessment & Plan  Marked monocytosis with erythroblasts/pRBCs, bone marrow biopsy recommended to exclude leukemia, myeloproliferative disorders  43.70% monocytes on 12/29 AM CBC  Bone marrow biopsy consistent with CMML, plan [facility]     Ulcer of great toe, right, with unspecified severity ([facility])  Assessment & Plan  Ulcer appears clean and dry, no bone penetration. Degenerative changes on x-ray but not correlated to area of ulcer, no  osteomyelitis.  -No further wound care needs    Acute on chronic kidney injury.- (present on admission)  Assessment & Plan  Improving  Avoid nephrotoxins as much as possible, renally dose medications, monitor inputs and outputs     Chronic atrial fibrillation (HCC)- (present on admission)  Assessment & Plan  AICD for heart failure in place  Not on anticoagulation 2/2 easy bleeding and CML.   Resume carvedilol for rate control with hold parameters.    Tricuspid regurgitation- (present on admission)  Assessment & Plan  Echo 12/27 showed severe tricuspid regurgitation  Monitor and optimize volume status while inpatient  [facility]    Mitral regurgitation- (present on admission)  Assessment & Plan  History of, s/p mitral valve clip in 10/2020  Echocardiogram 12/27 showed mild mitral stenosis   Monitor and optimize volume status, [facility]     Bacteremia due to Escherichia coli  Assessment & Plan  S/p IV ceftriaxone & doxycycline       BPH (benign prostatic hyperplasia)- (present on admission)  Assessment & Plan  Continue home tamsulosin     Elevated liver enzymes- (present on admission)  Assessment & Plan  AST/ALT normal in 11/2020 at [facility], t bili was 2.4; AST//218, T bili 3.6 admission  History of heavy drinking per patient, none now per his report -though patient reports 12 to 24 pack use  Hepatitis panel negative  US liver ordered - cholelithiasis with borderline gallbladder wall thickening  Likely secondary to hepatic congestion from heart failure, and secondary to his myeloid leukemia  Counseled to stop alcohol, will need regular lab follow-up       VTE prophylaxis: Heparin SC

## 2021-01-05 NOTE — PROGRESS NOTES
Assumed care of patient at 0400. Received report from T8 at 0330. Pt is awake and alert in bed, A&Ox4, on 1L NC, reporting 9/10 pain. Bed lowered and locked, call light and belongings within reach, pt educated on using the call light, hourly rounding in place. Pt medicated per MAR. Assessment complete, agree with previous RN assessment.

## 2021-01-05 NOTE — DISCHARGE PLANNING
Anticipated Discharge Disposition:   · SNF    Action:   · Outreach call to pt's sonRafael to provide update that [facility] is not in-network with pt's insurance. Likely due to being [place] insurance and discuss expanding referrals. Per son, he was unable to discuss due to being at work. Requested to call LSW back. LSW provided contact number for son to call back.     Barriers to Discharge:   · Insurance being out of network for local facilities.  · Needing to expand referrals for SNF  · Accepting SNF with bed availability.     Plan:   · Care coordination will continue to follow up and assist with discharge plans/barriers.

## 2021-01-06 LAB
ANION GAP SERPL CALC-SCNC: 14 MMOL/L (ref 7–16)
ANISOCYTOSIS BLD QL SMEAR: ABNORMAL
BASOPHILS # BLD AUTO: 1.8 % (ref 0–1.8)
BASOPHILS # BLD: 0.15 K/UL (ref 0–0.12)
BUN SERPL-MCNC: 46 MG/DL (ref 8–22)
CALCIUM SERPL-MCNC: 8.5 MG/DL (ref 8.5–10.5)
CHLORIDE SERPL-SCNC: 106 MMOL/L (ref 96–112)
CO2 SERPL-SCNC: 16 MMOL/L (ref 20–33)
CREAT SERPL-MCNC: 1.86 MG/DL (ref 0.5–1.4)
EOSINOPHIL # BLD AUTO: 0.15 K/UL (ref 0–0.51)
EOSINOPHIL NFR BLD: 1.8 % (ref 0–6.9)
ERYTHROCYTE [DISTWIDTH] IN BLOOD BY AUTOMATED COUNT: 82.3 FL (ref 35.9–50)
ERYTHROCYTE [DISTWIDTH] IN BLOOD BY AUTOMATED COUNT: 82.9 FL (ref 35.9–50)
FERRITIN SERPL-MCNC: 393 NG/ML (ref 22–322)
GLUCOSE SERPL-MCNC: 106 MG/DL (ref 65–99)
HCT VFR BLD AUTO: 23.9 % (ref 42–52)
HCT VFR BLD AUTO: 26.4 % (ref 42–52)
HGB BLD-MCNC: 7.5 G/DL (ref 14–18)
HGB BLD-MCNC: 8.1 G/DL (ref 14–18)
HYPOCHROMIA BLD QL SMEAR: ABNORMAL
IRON SATN MFR SERPL: 52 % (ref 15–55)
IRON SERPL-MCNC: 106 UG/DL (ref 50–180)
LDH SERPL L TO P-CCNC: 264 U/L (ref 107–266)
LYMPHOCYTES # BLD AUTO: 1.03 K/UL (ref 1–4.8)
LYMPHOCYTES NFR BLD: 12.5 % (ref 22–41)
MACROCYTES BLD QL SMEAR: ABNORMAL
MANUAL DIFF BLD: NORMAL
MCH RBC QN AUTO: 30.8 PG (ref 27–33)
MCH RBC QN AUTO: 31.6 PG (ref 27–33)
MCHC RBC AUTO-ENTMCNC: 30.7 G/DL (ref 33.7–35.3)
MCHC RBC AUTO-ENTMCNC: 31.4 G/DL (ref 33.7–35.3)
MCV RBC AUTO: 100.4 FL (ref 81.4–97.8)
MCV RBC AUTO: 100.8 FL (ref 81.4–97.8)
MONOCYTES # BLD AUTO: 4.76 K/UL (ref 0–0.85)
MONOCYTES NFR BLD AUTO: 58 % (ref 0–13.4)
MORPHOLOGY BLD-IMP: NORMAL
NEUTROPHILS # BLD AUTO: 2.12 K/UL (ref 1.82–7.42)
NEUTROPHILS NFR BLD: 25.9 % (ref 44–72)
NRBC # BLD AUTO: 0.09 K/UL
NRBC BLD-RTO: 1.1 /100 WBC
PLATELET # BLD AUTO: 73 K/UL (ref 164–446)
PLATELET # BLD AUTO: 79 K/UL (ref 164–446)
PLATELET BLD QL SMEAR: NORMAL
PMV BLD AUTO: 10.7 FL (ref 9–12.9)
PMV BLD AUTO: 11.4 FL (ref 9–12.9)
POIKILOCYTOSIS BLD QL SMEAR: NORMAL
POLYCHROMASIA BLD QL SMEAR: NORMAL
POTASSIUM SERPL-SCNC: 3.9 MMOL/L (ref 3.6–5.5)
RBC # BLD AUTO: 2.37 M/UL (ref 4.7–6.1)
RBC # BLD AUTO: 2.63 M/UL (ref 4.7–6.1)
RBC BLD AUTO: PRESENT
SODIUM SERPL-SCNC: 136 MMOL/L (ref 135–145)
TIBC SERPL-MCNC: 203 UG/DL (ref 250–450)
UIBC SERPL-MCNC: 97 UG/DL (ref 110–370)
URATE SERPL-MCNC: 4.3 MG/DL (ref 2.5–8.3)
WBC # BLD AUTO: 8.2 K/UL (ref 4.8–10.8)
WBC # BLD AUTO: 9.6 K/UL (ref 4.8–10.8)

## 2021-01-06 PROCEDURE — 770006 HCHG ROOM/CARE - MED/SURG/GYN SEMI*

## 2021-01-06 PROCEDURE — 85027 COMPLETE CBC AUTOMATED: CPT

## 2021-01-06 PROCEDURE — 83550 IRON BINDING TEST: CPT

## 2021-01-06 PROCEDURE — A9270 NON-COVERED ITEM OR SERVICE: HCPCS | Performed by: STUDENT IN AN ORGANIZED HEALTH CARE EDUCATION/TRAINING PROGRAM

## 2021-01-06 PROCEDURE — 700111 HCHG RX REV CODE 636 W/ 250 OVERRIDE (IP): Performed by: HOSPITALIST

## 2021-01-06 PROCEDURE — A9270 NON-COVERED ITEM OR SERVICE: HCPCS | Performed by: HOSPITALIST

## 2021-01-06 PROCEDURE — 700111 HCHG RX REV CODE 636 W/ 250 OVERRIDE (IP): Performed by: STUDENT IN AN ORGANIZED HEALTH CARE EDUCATION/TRAINING PROGRAM

## 2021-01-06 PROCEDURE — 700102 HCHG RX REV CODE 250 W/ 637 OVERRIDE(OP): Performed by: STUDENT IN AN ORGANIZED HEALTH CARE EDUCATION/TRAINING PROGRAM

## 2021-01-06 PROCEDURE — 36415 COLL VENOUS BLD VENIPUNCTURE: CPT

## 2021-01-06 PROCEDURE — 84550 ASSAY OF BLOOD/URIC ACID: CPT

## 2021-01-06 PROCEDURE — 80048 BASIC METABOLIC PNL TOTAL CA: CPT

## 2021-01-06 PROCEDURE — 700101 HCHG RX REV CODE 250: Performed by: STUDENT IN AN ORGANIZED HEALTH CARE EDUCATION/TRAINING PROGRAM

## 2021-01-06 PROCEDURE — 90670 PCV13 VACCINE IM: CPT | Performed by: STUDENT IN AN ORGANIZED HEALTH CARE EDUCATION/TRAINING PROGRAM

## 2021-01-06 PROCEDURE — 700102 HCHG RX REV CODE 250 W/ 637 OVERRIDE(OP): Performed by: HOSPITALIST

## 2021-01-06 PROCEDURE — 82728 ASSAY OF FERRITIN: CPT

## 2021-01-06 PROCEDURE — 90471 IMMUNIZATION ADMIN: CPT

## 2021-01-06 PROCEDURE — 83615 LACTATE (LD) (LDH) ENZYME: CPT

## 2021-01-06 PROCEDURE — 85007 BL SMEAR W/DIFF WBC COUNT: CPT

## 2021-01-06 PROCEDURE — 99232 SBSQ HOSP IP/OBS MODERATE 35: CPT | Performed by: HOSPITALIST

## 2021-01-06 PROCEDURE — 83540 ASSAY OF IRON: CPT

## 2021-01-06 RX ADMIN — HEPARIN SODIUM 5000 UNITS: 5000 INJECTION, SOLUTION INTRAVENOUS; SUBCUTANEOUS at 05:00

## 2021-01-06 RX ADMIN — LOSARTAN POTASSIUM 25 MG: 25 TABLET, FILM COATED ORAL at 04:58

## 2021-01-06 RX ADMIN — OXYCODONE HYDROCHLORIDE 10 MG: 10 TABLET ORAL at 11:28

## 2021-01-06 RX ADMIN — HEPARIN SODIUM 5000 UNITS: 5000 INJECTION, SOLUTION INTRAVENOUS; SUBCUTANEOUS at 17:51

## 2021-01-06 RX ADMIN — PNEUMOCOCCAL 13-VALENT CONJUGATE VACCINE 0.5 ML: 2.2; 2.2; 2.2; 2.2; 2.2; 4.4; 2.2; 2.2; 2.2; 2.2; 2.2; 2.2; 2.2 INJECTION, SUSPENSION INTRAMUSCULAR at 06:09

## 2021-01-06 RX ADMIN — ACETAMINOPHEN 1000 MG: 500 TABLET ORAL at 11:26

## 2021-01-06 RX ADMIN — LIDOCAINE 1 PATCH: 50 PATCH TOPICAL at 15:45

## 2021-01-06 RX ADMIN — DOCUSATE SODIUM 50 MG AND SENNOSIDES 8.6 MG 2 TABLET: 8.6; 5 TABLET, FILM COATED ORAL at 17:51

## 2021-01-06 RX ADMIN — OXYCODONE HYDROCHLORIDE 10 MG: 10 TABLET ORAL at 05:00

## 2021-01-06 RX ADMIN — ALLOPURINOL 150 MG: 300 TABLET ORAL at 05:00

## 2021-01-06 RX ADMIN — ACETAMINOPHEN 1000 MG: 500 TABLET ORAL at 05:00

## 2021-01-06 RX ADMIN — OXYCODONE HYDROCHLORIDE 10 MG: 10 TABLET ORAL at 15:46

## 2021-01-06 RX ADMIN — OXYCODONE HYDROCHLORIDE 10 MG: 10 TABLET ORAL at 20:15

## 2021-01-06 RX ADMIN — ACETAMINOPHEN 1000 MG: 500 TABLET ORAL at 17:50

## 2021-01-06 ASSESSMENT — FIBROSIS 4 INDEX: FIB4 SCORE: 5.09

## 2021-01-06 ASSESSMENT — PAIN DESCRIPTION - PAIN TYPE
TYPE: ACUTE PAIN

## 2021-01-06 ASSESSMENT — ENCOUNTER SYMPTOMS
HEADACHES: 0
BLOOD IN STOOL: 0
PALPITATIONS: 0
HEMOPTYSIS: 0
COUGH: 0
SEIZURES: 0
SPUTUM PRODUCTION: 0
CHILLS: 0
SHORTNESS OF BREATH: 0
FOCAL WEAKNESS: 0
STRIDOR: 0
SPEECH CHANGE: 0
ABDOMINAL PAIN: 0
DIARRHEA: 0
DIZZINESS: 0
EYE PAIN: 0
EYE DISCHARGE: 0
HALLUCINATIONS: 0
FEVER: 0
EYE REDNESS: 0
MYALGIAS: 1
BRUISES/BLEEDS EASILY: 0
VOMITING: 0
SORE THROAT: 0
NERVOUS/ANXIOUS: 0
LOSS OF CONSCIOUSNESS: 0
FLANK PAIN: 0

## 2021-01-06 NOTE — PROGRESS NOTES
"Hospital Medicine Daily Progress Note    Date of Service  1/6/2021    Chief Complaint  85 y.o. male admitted 12/27/2020 with shortness of breath    Hospital Course  85 years old male with a past medical history of heart failure with reduced ejection fraction, atrial fibrillation, CML, prostate cancer s/p radiation admitted 12/23 with shortness of breath.  He was found to have acute hypoxemic respiratory failure saturating in the mid 80s at presentation.  Patient was diuresed with improvement of respiratory status.  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 Case discussed with oncology Dr. Kumar on 1/6, who do not think he has acute leukemia at this point, but overall has poor prognosis, recommended considering palliative[facility]hospice or [facility].  Palliative care consultation placed.      Interval Problem Update  Heart rate 60s-70s, requiring 1-1.5 L of oxygen to achieve adequate saturation, encourage incentive spirometry.  Continue current dose of carvedilol and diuretics with hold parameters.  Hemoglobin has been stable, will reduce the frequency of monitoring  I discussed with oncology Dr. Kumar who does not think he has acute leukemia at this point, but overall has poor prognosis, recommended considering palliative/hospice or [facility].   Hemoglobin 8.1, platelets 79.  No signs of active bleeding  Patient is weak, will need placement, discussed with care coordination.  Issues with insurance was covering SNF stay.   Listed contact is [facility] [phone], I called to discuss goals of care /placement on Jan 5, Jan 6, no answer, voicemail left.      Discussed with patient, patient's nurse and with multidisciplinary team during rounds including , pharmacist and charge nurse.      Consultants/Specialty  D/w oncology Dr Kumar \"Poor prognosis, not acute leukemia, consider palliative / [facility]\"  Palliative " care    Code Status  DNAR/DNI    Disposition  Needs SNF, issues with insurance covering SNF stay    Review of Systems  Review of Systems   Constitutional: Positive for malaise/fatigue. Negative for chills and fever.   HENT: Negative for congestion and sore throat.    Eyes: Negative for pain, discharge and redness.   Respiratory: Negative for cough, hemoptysis, sputum production, shortness of breath and stridor.    Cardiovascular: Positive for leg swelling. Negative for chest pain and palpitations.   Gastrointestinal: Negative for abdominal pain, blood in stool, diarrhea and vomiting.   Genitourinary: Negative for flank pain, hematuria and urgency.   Musculoskeletal: Positive for myalgias.   Skin: Negative.    Neurological: Negative for dizziness, speech change, focal weakness, seizures, loss of consciousness and headaches.   Endo/Heme/Allergies: Does not bruise/bleed easily.   Psychiatric/Behavioral: Negative for hallucinations and suicidal ideas. The patient is not nervous/anxious.       Physical Exam  Temp:  [36.8 °C (98.2 °F)-37.3 °C (99.1 °F)] 36.8 °C (98.3 °F)  Pulse:  [66-95] 66  Resp:  [20-24] 22  BP: ()/() 104/67  SpO2:  [88 %-93 %] 93 %    Physical Exam  Constitutional:       General: He is not in acute distress.     Appearance: He is not ill-appearing.   HENT:      Head: Normocephalic and atraumatic.      Right Ear: External ear normal.      Left Ear: External ear normal.      Mouth/Throat:      Mouth: Mucous membranes are moist.      Pharynx: No oropharyngeal exudate or posterior oropharyngeal erythema.   Eyes:      General: Scleral icterus present.      Extraocular Movements: Extraocular movements intact.      Pupils: Pupils are equal, round, and reactive to light.   Neck:      Musculoskeletal: Normal range of motion and neck supple.   Cardiovascular:      Rate and Rhythm: Regular rhythm. Tachycardia present.      Pulses: Normal pulses.      Heart sounds: Murmur present.   Pulmonary:       Effort: No respiratory distress.      Breath sounds: Rhonchi (Diffuse) present. No wheezing or rales.      Comments: Reduced air entry bilaterally basally  Abdominal:      General: Bowel sounds are normal. There is no distension.      Palpations: Abdomen is soft.      Tenderness: There is no abdominal tenderness. There is no guarding.   Musculoskeletal:         General: No swelling or tenderness.      Right lower leg: Edema present.      Left lower leg: Edema present.   Skin:     General: Skin is warm and dry.      Capillary Refill: Capillary refill takes 2 to 3 seconds.      Coloration: Skin is jaundiced and pale.      Findings: Bruising present.   Neurological:      General: No focal deficit present.      Mental Status: He is oriented to person, place, and time.      Sensory: No sensory deficit.      Motor: No weakness.   Psychiatric:         Mood and Affect: Mood normal.         Behavior: Behavior normal.       Fluids    Intake/Output Summary (Last 24 hours) at 1/6/2021 1406  Last data filed at 1/6/2021 1200  Gross per 24 hour   Intake 1490 ml   Output --   Net 1490 ml     Laboratory  Recent Labs     01/05/21  1907 01/06/21  0059 01/06/21  1255   WBC 7.4 8.2 9.6   RBC 2.51* 2.37* 2.63*   HEMOGLOBIN 7.8* 7.5* 8.1*   HEMATOCRIT 25.3* 23.9* 26.4*   .8* 100.8* 100.4*   MCH 31.1 31.6 30.8   MCHC 30.8* 31.4* 30.7*   RDW 81.6* 82.3* 82.9*   PLATELETCT 73* 73* 79*   MPV 10.5 10.7 11.4     Recent Labs     01/04/21  0339 01/05/21  0237 01/06/21  0059   SODIUM 137 132* 136   POTASSIUM 4.4 4.2 3.9   CHLORIDE 107 103 106   CO2 16* 18* 16*   GLUCOSE 104* 90 106*   BUN 45* 43* 46*   CREATININE 1.93* 1.79* 1.86*   CALCIUM 8.6 8.7 8.5                   Imaging  DX-CHEST-2 VIEWS   Final Result      1.  Low lung volumes   2.  Persistently enlarged cardiac silhouette   3.  Pulmonary edema and central vascular congestion   4.  Probable LEFT pleural effusion      DX-CHEST-PORTABLE (1 VIEW)   Final Result      No significant  change from prior exam.      DX-TOE(S) 2+ RIGHT   Final Result      1.  Hallux valgus with bunion formation and severe degenerative change of RIGHT 1st metatarsophalangeal joint.   2.  No fracture or gross focal bony destruction.      US-RUQ   Final Result      Cholelithiasis with borderline gallbladder wall thickening which may related to right heart failure (versus cholecystitis) as there is evidence of pleural fluid and ascites with bidirectional flow in the main portal vein.      Echogenic right kidney with renal cortical thinning consistent with medical renal disease.      EC-ECHOCARDIOGRAM COMPLETE W/O CONT   Final Result      NM-LUNG VENT/PERF IMAGING   Final Result      Low probability VQ scan.      US-EXTREMITY VENOUS LOWER BILAT   Final Result      DX-CHEST-PORTABLE (1 VIEW)   Final Result      1.  Cardiomegaly.      2.  Bibasilar atelectasis.         Assessment/Plan  * Acute respiratory failure with hypoxia (HCC)- (present on admission)  Assessment & Plan  Secondary to acute on chronic heart failure  Echocardiogram 12/27 -LVEF 25%, dilated right ventricle, severe tricuspid regurgitation  CXR pulmonary edema with possible L pleural effusion  Improving with diuretics  bumetanide, spironolactone with hold parameters.       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  Pending cytogenetics to risk stratify which will determine if chemotherapy will be beneficial    1/6, I discussed with oncology Dr. Kumar who does not think he has acute leukemia at this point, but overall has poor prognosis, recommended considering palliative/hospice or [facility].    Acute on chronic congestive heart failure (HCC)- (present on admission)  Assessment & Plan  Echocardiogram 12/27 -LVEF 25%, dilated right ventricle, severe tricuspid regurgitation  Resume  carvedilol, losartan, spironolactone with hold parameters.       Monocytosis- (present on admission)  Assessment & Plan  Marked monocytosis with erythroblasts/pRBCs, bone marrow biopsy recommended to exclude leukemia, myeloproliferative disorders  43.70% monocytes on 12/29 AM CBC  Bone marrow biopsy consistent with CMML, plan [facility]     Ulcer of great toe, right, with unspecified severity (HCC)  Assessment & Plan  Ulcer appears clean and dry, no bone penetration. Degenerative changes on x-ray but not correlated to area of ulcer, no osteomyelitis.  -No further wound care needs    Acute on chronic kidney injury.- (present on admission)  Assessment & Plan  Improving  Avoid nephrotoxins as much as possible, renally dose medications, monitor inputs and outputs     Chronic atrial fibrillation (HCC)- (present on admission)  Assessment & Plan  AICD for heart failure in place  Not on anticoagulation 2/2 easy bleeding and CML.   Resume carvedilol for rate control with hold parameters.    Tricuspid regurgitation- (present on admission)  Assessment & Plan  Echo 12/27 showed severe tricuspid regurgitation  Monitor and optimize volume status while inpatient  [facility]    Mitral regurgitation- (present on admission)  Assessment & Plan  History of, s/p mitral valve clip in 10/2020  Echocardiogram 12/27 showed mild mitral stenosis   Monitor and optimize volume status, [facility]     Bacteremia due to Escherichia coli  Assessment & Plan  S/p IV ceftriaxone & doxycycline       BPH (benign prostatic hyperplasia)- (present on admission)  Assessment & Plan  Continue home tamsulosin     Elevated liver enzymes- (present on admission)  Assessment & Plan  AST/ALT normal in 11/2020 at [facility], t bili was 2.4; AST//218, T bili 3.6 admission  History of heavy drinking per patient, none now per his report -though patient reports 12 to 24 pack use  Hepatitis panel  negative  US liver ordered - cholelithiasis with borderline gallbladder wall thickening  Likely secondary to hepatic congestion from heart failure, and secondary to his myeloid leukemia  Counseled to stop alcohol, will need regular lab follow-up       VTE prophylaxis: Heparin SC

## 2021-01-06 NOTE — PROGRESS NOTES
Assumed care at 1900. Received report from day shift RN. Pt is awake and alert in bed, A&Ox 4, on 1L NC, reports a pain level of 4/10. Fall precautions and appropriate signs in place. Call light and belongings within reach. Bed alarm and hourly rounding in place. Communication board updated. Pt denies any additional needs at this time.    Normal patterns of skin texture/Normal patterns of skin integrity/Normal patterns of skin perfusion/Normal patterns of skin pigmentation/No eruptions/Normal patterns of skin color/No signs of meconium exposure/Normal patterns of skin vascularity/No rashes

## 2021-01-06 NOTE — CARE PLAN
Problem: Knowledge Deficit  Goal: Knowledge of disease process/condition, treatment plan, diagnostic tests, and medications will improve  Outcome: PROGRESSING AS EXPECTED  Note: POC discussed with pt regarding steps moving forward. Pt asking for medication to give independence. RN encouraged pt to ask MD about treatment plan. Pt agreeable.      Problem: Skin Integrity  Goal: Risk for impaired skin integrity will decrease  Outcome: PROGRESSING AS EXPECTED  Note: Mepilex, barrier cream, q2 turns, and extra pillows for positioning in place. Grey oxygen tubing foam and waffle overlay in place.

## 2021-01-06 NOTE — DISCHARGE PLANNING
Agency/Facility Name: [facility]  Spoke To: Sandra   Outcome: Will keep referral open. Requesting information about cancer treatment. [facility] will follow up when a decision has been made by pt and family.

## 2021-01-06 NOTE — DISCHARGE PLANNING
Received Choice form at 1026  Agency/Facility Name: [facility]  Referral sent per Choice form @ [id number]

## 2021-01-06 NOTE — THERAPY
Missed Therapy     Patient Name: Stephen Ortiz  Age:  85 y.o., Sex:  male  Medical Record #: [id number]  Today's Date: 1/6/2021    Discussed missed therapy with Rn, pt refusing activity stating he needs to cleaned and is currently watching the news. Educated on benefits of therapy and importance of mobility. Will follow up while in house.

## 2021-01-06 NOTE — DISCHARGE PLANNING
Agency/Facility Name: [facility]  Spoke To: Cristal  Outcome: pt declined due to payor source

## 2021-01-07 LAB
ANION GAP SERPL CALC-SCNC: 14 MMOL/L (ref 7–16)
BUN SERPL-MCNC: 44 MG/DL (ref 8–22)
CALCIUM SERPL-MCNC: 8.6 MG/DL (ref 8.5–10.5)
CHLORIDE SERPL-SCNC: 104 MMOL/L (ref 96–112)
CO2 SERPL-SCNC: 18 MMOL/L (ref 20–33)
CREAT SERPL-MCNC: 1.9 MG/DL (ref 0.5–1.4)
ERYTHROCYTE [DISTWIDTH] IN BLOOD BY AUTOMATED COUNT: 84.5 FL (ref 35.9–50)
GLUCOSE SERPL-MCNC: 112 MG/DL (ref 65–99)
HCT VFR BLD AUTO: 26.7 % (ref 42–52)
HGB BLD-MCNC: 8.3 G/DL (ref 14–18)
LDH SERPL L TO P-CCNC: 249 U/L (ref 107–266)
MCH RBC QN AUTO: 31.8 PG (ref 27–33)
MCHC RBC AUTO-ENTMCNC: 31.1 G/DL (ref 33.7–35.3)
MCV RBC AUTO: 102.3 FL (ref 81.4–97.8)
PLATELET # BLD AUTO: 100 K/UL (ref 164–446)
PMV BLD AUTO: 10.8 FL (ref 9–12.9)
POTASSIUM SERPL-SCNC: 4 MMOL/L (ref 3.6–5.5)
RBC # BLD AUTO: 2.61 M/UL (ref 4.7–6.1)
SODIUM SERPL-SCNC: 136 MMOL/L (ref 135–145)
URATE SERPL-MCNC: 4.4 MG/DL (ref 2.5–8.3)
WBC # BLD AUTO: 13.3 K/UL (ref 4.8–10.8)

## 2021-01-07 PROCEDURE — 83615 LACTATE (LD) (LDH) ENZYME: CPT

## 2021-01-07 PROCEDURE — 700102 HCHG RX REV CODE 250 W/ 637 OVERRIDE(OP): Performed by: HOSPITALIST

## 2021-01-07 PROCEDURE — A9270 NON-COVERED ITEM OR SERVICE: HCPCS | Performed by: HOSPITALIST

## 2021-01-07 PROCEDURE — 700101 HCHG RX REV CODE 250: Performed by: STUDENT IN AN ORGANIZED HEALTH CARE EDUCATION/TRAINING PROGRAM

## 2021-01-07 PROCEDURE — 85027 COMPLETE CBC AUTOMATED: CPT

## 2021-01-07 PROCEDURE — 700111 HCHG RX REV CODE 636 W/ 250 OVERRIDE (IP): Performed by: HOSPITALIST

## 2021-01-07 PROCEDURE — 84550 ASSAY OF BLOOD/URIC ACID: CPT

## 2021-01-07 PROCEDURE — 700102 HCHG RX REV CODE 250 W/ 637 OVERRIDE(OP): Performed by: STUDENT IN AN ORGANIZED HEALTH CARE EDUCATION/TRAINING PROGRAM

## 2021-01-07 PROCEDURE — A9270 NON-COVERED ITEM OR SERVICE: HCPCS | Performed by: STUDENT IN AN ORGANIZED HEALTH CARE EDUCATION/TRAINING PROGRAM

## 2021-01-07 PROCEDURE — 85007 BL SMEAR W/DIFF WBC COUNT: CPT

## 2021-01-07 PROCEDURE — 36415 COLL VENOUS BLD VENIPUNCTURE: CPT

## 2021-01-07 PROCEDURE — 700111 HCHG RX REV CODE 636 W/ 250 OVERRIDE (IP): Performed by: STUDENT IN AN ORGANIZED HEALTH CARE EDUCATION/TRAINING PROGRAM

## 2021-01-07 PROCEDURE — 80048 BASIC METABOLIC PNL TOTAL CA: CPT

## 2021-01-07 PROCEDURE — 770006 HCHG ROOM/CARE - MED/SURG/GYN SEMI*

## 2021-01-07 PROCEDURE — 99232 SBSQ HOSP IP/OBS MODERATE 35: CPT | Performed by: HOSPITALIST

## 2021-01-07 RX ORDER — FUROSEMIDE 10 MG/ML
20 INJECTION INTRAMUSCULAR; INTRAVENOUS
Status: COMPLETED | OUTPATIENT
Start: 2021-01-07 | End: 2021-01-08

## 2021-01-07 RX ADMIN — OXYCODONE HYDROCHLORIDE 10 MG: 10 TABLET ORAL at 09:45

## 2021-01-07 RX ADMIN — OXYCODONE HYDROCHLORIDE 10 MG: 10 TABLET ORAL at 14:39

## 2021-01-07 RX ADMIN — BUMETANIDE 1 MG: 1 TABLET ORAL at 04:55

## 2021-01-07 RX ADMIN — MORPHINE SULFATE 1 MG: 4 INJECTION INTRAVENOUS at 03:28

## 2021-01-07 RX ADMIN — ACETAMINOPHEN 1000 MG: 500 TABLET ORAL at 04:55

## 2021-01-07 RX ADMIN — LIDOCAINE 1 PATCH: 50 PATCH TOPICAL at 17:05

## 2021-01-07 RX ADMIN — HEPARIN SODIUM 5000 UNITS: 5000 INJECTION, SOLUTION INTRAVENOUS; SUBCUTANEOUS at 04:55

## 2021-01-07 RX ADMIN — FUROSEMIDE 20 MG: 10 INJECTION, SOLUTION INTRAMUSCULAR; INTRAVENOUS at 14:39

## 2021-01-07 RX ADMIN — HEPARIN SODIUM 5000 UNITS: 5000 INJECTION, SOLUTION INTRAVENOUS; SUBCUTANEOUS at 17:03

## 2021-01-07 RX ADMIN — OXYCODONE HYDROCHLORIDE 10 MG: 10 TABLET ORAL at 00:07

## 2021-01-07 RX ADMIN — ISOSORBIDE MONONITRATE 30 MG: 30 TABLET, EXTENDED RELEASE ORAL at 04:55

## 2021-01-07 RX ADMIN — ACETAMINOPHEN 1000 MG: 500 TABLET ORAL at 17:03

## 2021-01-07 RX ADMIN — OXYCODONE HYDROCHLORIDE 10 MG: 10 TABLET ORAL at 20:58

## 2021-01-07 RX ADMIN — CARVEDILOL 3.12 MG: 3.12 TABLET, FILM COATED ORAL at 04:55

## 2021-01-07 RX ADMIN — ALLOPURINOL 150 MG: 300 TABLET ORAL at 04:55

## 2021-01-07 RX ADMIN — OXYCODONE HYDROCHLORIDE 10 MG: 10 TABLET ORAL at 04:54

## 2021-01-07 RX ADMIN — LOSARTAN POTASSIUM 25 MG: 25 TABLET, FILM COATED ORAL at 04:55

## 2021-01-07 RX ADMIN — MORPHINE SULFATE 1 MG: 4 INJECTION INTRAVENOUS at 07:30

## 2021-01-07 RX ADMIN — SPIRONOLACTONE 25 MG: 25 TABLET ORAL at 04:55

## 2021-01-07 RX ADMIN — ACETAMINOPHEN 1000 MG: 500 TABLET ORAL at 12:29

## 2021-01-07 ASSESSMENT — PAIN DESCRIPTION - PAIN TYPE
TYPE: ACUTE PAIN

## 2021-01-07 ASSESSMENT — ENCOUNTER SYMPTOMS
BLOOD IN STOOL: 0
VOMITING: 0
SPEECH CHANGE: 0
STRIDOR: 0
FOCAL WEAKNESS: 0
HEADACHES: 0
DIZZINESS: 0
FEVER: 0
HEMOPTYSIS: 0
EYE PAIN: 0
ABDOMINAL PAIN: 0
PALPITATIONS: 0
EYE REDNESS: 0
CHILLS: 0
SORE THROAT: 0
SPUTUM PRODUCTION: 0
DIARRHEA: 0
FLANK PAIN: 0
NERVOUS/ANXIOUS: 0
COUGH: 0
LOSS OF CONSCIOUSNESS: 0
HALLUCINATIONS: 0
EYE DISCHARGE: 0
SEIZURES: 0
MYALGIAS: 1
BRUISES/BLEEDS EASILY: 0

## 2021-01-07 ASSESSMENT — PAIN SCALES - WONG BAKER
WONGBAKER_NUMERICALRESPONSE: DOESN'T HURT AT ALL
WONGBAKER_NUMERICALRESPONSE: HURTS JUST A LITTLE BIT
WONGBAKER_NUMERICALRESPONSE: HURTS A LITTLE MORE
WONGBAKER_NUMERICALRESPONSE: HURTS A LITTLE MORE

## 2021-01-07 ASSESSMENT — FIBROSIS 4 INDEX: FIB4 SCORE: 4.7

## 2021-01-07 NOTE — PALLIATIVE CARE
Palliative Care   Sent Fax request for AD/POLST from [facility].    Received fax wit pt's AD, scanned into EMR, will place paper copy on hard chart and should be sent with pt upon discharge.         Thank you for allowing Palliative Care to participate in this patient's care. Please feel free to call [phone] with any questions or concerns.

## 2021-01-07 NOTE — PROGRESS NOTES
Assumed care at 0700 following night nurse report, assessment completed. Patient is A&O X4. Patient complains pain at this time, medication administered. Fall precautions and appropriate signs in place. Call light/phone system and RN extension updated on whiteboard. Bed alarm is in use, patient is one person assist. Patient denies any additional needs at this time, Hourly rounding place.

## 2021-01-07 NOTE — CONSULTS
Reason for PC Consult: Advance Care Planning    Consulted by:   Dr. Mckee    Assessment:  General:   85 year old male admitted for acute respiratory failure on 12/27/20. Pt has a history of CML, prostate cancer, skin cancer, cataract, CHF, Afib, chronic pain and AICD placement. Pt presented to [facility] ED with fatigue and shortness of breath.     Social:   Pt recently moved from [place] to live with his son. Pt is a  and [facility], pt reports he has established with the [facility], he was getting services at the [facility] up until his move to [place].     Dyspnea: No, 95% on 4L NC  Last BM: 01/07/21    Pain: No    Depression: No    Dementia: No       Spiritual:  Is Anglican or spirituality important for coping with this illness? No, Pt declined visit from   Has a  or spiritual provider visit been requested? No    Palliative Performance Scale: 30%    Advance Directive: Advance Directive    DPOA: Yes, Rafael Ortiz  POLST: None on File      To locate the AD/POLST, please hover the cursor over the patient's code status to find all linked ACP documents.    Code Status: DNR/DNI    Outcome:  PC RN visited pt at bedside, introduced self and role of PC. Discussed pt's understanding of clinical picture, pt stated he spoke with the doctor and he has cancer and is a candidate for hospice. Pt states he wants to get that started.    PC RN discussed hospice in detail, philosophy, goals and support provided. Discussed pt's qualifying criteria, pt verbalized understanding. Pt states he would like [facility] to come speak with him. Discussed placement, pt [facility].    Discussed with SW, she will call the [facility] for possible placement assistance with hospice.    Discussed and reviewed pt's AD, pt confirms it still reflects his wishes. Discussed POLST form, pt verbalized agreement with DNR, comfort focused treatment and no feeding  tubes. POLST completed, scanned copy into EMR, original placed on hard chart with AD and should be sent with pt upon discharge.     Obtained hospice choice for [facility], faxed to [facility].    Active listening, education, validation, normalization, therapeutic touch, and emotional support provided throughout encounter.    Updated:   Apolonia Gutierrez     Plan:   AD on file, POLST completed. [facility] to evaluate pt for services. Awaiting placement with [facility] assistance.     Thank you for allowing Palliative Care to participate in this patient's care. Please feel free to call [phone] with any questions or concerns.

## 2021-01-07 NOTE — DISCHARGE PLANNING
Received Choice form at [phone]  Agency/Facility Name: [facility]  Referral sent per Choice form @ [phone]

## 2021-01-07 NOTE — CARE PLAN
"  Problem: Pain Management  Goal: Pain level will decrease to patient's comfort goal  Outcome: PROGRESSING AS EXPECTED  Note: Pt. Verbalizes satisfaction with pain control. Medicated for pain per MAR.     Problem: Psychosocial Needs:  Goal: Level of anxiety will decrease  Outcome: PROGRESSING AS EXPECTED  Note: Pt. Anxious stating \"get me off this work bench\". Reoriented, pt. Responds appropriately to emotional support     "

## 2021-01-07 NOTE — DISCHARGE PLANNING
Anticipated Discharge Disposition: Placement with Hospice VS Home with son and hospice.    Action: Saira, Palliative RN informed LSW, she obtained choice for hospice and sent the referral via fax to [facility].    Per Saira, the anticipated discharge disposition is the for this patient to [facility] the [facility] or [facility] with Hospice VS Home with son and hospice. Per Saira, the patient reports he has VA benefits.     Barriers to Discharge: None.    Plan: As Above. LSW to follow up with the [facility] to clarify patient's coverage for placement with hospice.

## 2021-01-07 NOTE — PROGRESS NOTES
"Hospital Medicine Daily Progress Note    Date of Service  1/7/2021    Chief Complaint  85 y.o. male admitted 12/27/2020 with shortness of breath    Hospital Course  85 years old male with a past medical history of heart failure with reduced ejection fraction, atrial fibrillation, CML, prostate cancer s/p radiation admitted 12/23 with shortness of breath.  He was found to have acute hypoxemic respiratory failure saturating in the mid 80s at presentation.  Patient was diuresed with improvement of respiratory status.  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 Case discussed with oncology Dr. Kumar on 1/6, who do not think he has acute leukemia at this point, but overall has poor prognosis, recommended considering palliative/hospice or [facility].  Palliative care consultation placed. Patient is expressing interest in hospice, referral placed.    Interval Problem Update  Heart rate 60s-90s,   Oxygen requirements increasing, I will order IV lasix   Patient is expressing interest in hospice, palliative care already consulted, I am placing referral to hospice.  Hemoglobin has been stable around 8.    Discussed with patient, patient's nurse and with multidisciplinary team during rounds including , pharmacist and charge nurse.      Consultants/Specialty  D/w oncology Dr Kumar \"Poor prognosis, not acute leukemia, consider palliative / f/u [facility]\"  Palliative care.       Code Status  DNAR/DNI    Disposition  Needs SNF, issues with insurance covering SNF stay. Hospice referral in.      Review of Systems  Review of Systems   Constitutional: Positive for malaise/fatigue. Negative for chills and fever.   HENT: Negative for congestion and sore throat.    Eyes: Negative for pain, discharge and redness.   Respiratory: Negative for cough, hemoptysis, sputum production and stridor. Shortness of breath: Improved.    Cardiovascular: Positive for leg " swelling. Negative for chest pain and palpitations.   Gastrointestinal: Negative for abdominal pain, blood in stool, diarrhea and vomiting.   Genitourinary: Negative for flank pain, hematuria and urgency.   Musculoskeletal: Positive for myalgias.   Skin: Negative.    Neurological: Negative for dizziness, speech change, focal weakness, seizures, loss of consciousness and headaches.   Endo/Heme/Allergies: Does not bruise/bleed easily.   Psychiatric/Behavioral: Negative for hallucinations and suicidal ideas. The patient is not nervous/anxious.       Physical Exam  Temp:  [36.3 °C (97.3 °F)-37.2 °C (98.9 °F)] 37.2 °C (98.9 °F)  Pulse:  [60-97] 60  Resp:  [17-20] 17  BP: ()/(61-74) 98/61  SpO2:  [90 %-95 %] 95 %    Physical Exam  Constitutional:       General: He is not in acute distress.     Appearance: He is not ill-appearing.   HENT:      Head: Normocephalic and atraumatic.      Right Ear: External ear normal.      Left Ear: External ear normal.      Mouth/Throat:      Mouth: Mucous membranes are moist.      Pharynx: No oropharyngeal exudate or posterior oropharyngeal erythema.   Eyes:      General: Scleral icterus present.      Extraocular Movements: Extraocular movements intact.      Pupils: Pupils are equal, round, and reactive to light.   Neck:      Musculoskeletal: Normal range of motion and neck supple.   Cardiovascular:      Rate and Rhythm: Regular rhythm. Tachycardia present.      Pulses: Normal pulses.      Heart sounds: Murmur present.   Pulmonary:      Effort: No respiratory distress.      Breath sounds: Rhonchi (Diffuse) present. No wheezing or rales.      Comments: Reduced air entry bilaterally basally  Abdominal:      General: Bowel sounds are normal. There is no distension.      Palpations: Abdomen is soft.      Tenderness: There is no abdominal tenderness. There is no guarding.   Musculoskeletal:         General: No swelling or tenderness.      Right lower leg: Edema present.      Left lower  leg: Edema present.   Skin:     General: Skin is warm and dry.      Capillary Refill: Capillary refill takes 2 to 3 seconds.      Coloration: Skin is jaundiced and pale.      Findings: Bruising present.   Neurological:      General: No focal deficit present.      Mental Status: He is oriented to person, place, and time.      Sensory: No sensory deficit.      Motor: No weakness.   Psychiatric:         Mood and Affect: Mood normal.         Behavior: Behavior normal.       Fluids    Intake/Output Summary (Last 24 hours) at 1/7/2021 1441  Last data filed at 1/6/2021 1700  Gross per 24 hour   Intake 240 ml   Output --   Net 240 ml     Laboratory  Recent Labs     01/06/21  0059 01/06/21  1255 01/07/21  1258   WBC 8.2 9.6 13.3*   RBC 2.37* 2.63* 2.61*   HEMOGLOBIN 7.5* 8.1* 8.3*   HEMATOCRIT 23.9* 26.4* 26.7*   .8* 100.4* 102.3*   MCH 31.6 30.8 31.8   MCHC 31.4* 30.7* 31.1*   RDW 82.3* 82.9* 84.5*   PLATELETCT 73* 79* 100*   MPV 10.7 11.4 10.8     Recent Labs     01/05/21  0237 01/06/21  0059 01/07/21  1258   SODIUM 132* 136 136   POTASSIUM 4.2 3.9 4.0   CHLORIDE 103 106 104   CO2 18* 16* 18*   GLUCOSE 90 106* 112*   BUN 43* 46* 44*   CREATININE 1.79* 1.86* 1.90*   CALCIUM 8.7 8.5 8.6                   Imaging  DX-CHEST-2 VIEWS   Final Result      1.  Low lung volumes   2.  Persistently enlarged cardiac silhouette   3.  Pulmonary edema and central vascular congestion   4.  Probable LEFT pleural effusion      DX-CHEST-PORTABLE (1 VIEW)   Final Result      No significant change from prior exam.      DX-TOE(S) 2+ RIGHT   Final Result      1.  Hallux valgus with bunion formation and severe degenerative change of RIGHT 1st metatarsophalangeal joint.   2.  No fracture or gross focal bony destruction.      US-RUQ   Final Result      Cholelithiasis with borderline gallbladder wall thickening which may related to right heart failure (versus cholecystitis) as there is evidence of pleural fluid and ascites with bidirectional  flow in the main portal vein.      Echogenic right kidney with renal cortical thinning consistent with medical renal disease.      EC-ECHOCARDIOGRAM COMPLETE W/O CONT   Final Result      NM-LUNG VENT/PERF IMAGING   Final Result      Low probability VQ scan.      US-EXTREMITY VENOUS LOWER BILAT   Final Result      DX-CHEST-PORTABLE (1 VIEW)   Final Result      1.  Cardiomegaly.      2.  Bibasilar atelectasis.         Assessment/Plan  * Acute respiratory failure with hypoxia (HCC)- (present on admission)  Assessment & Plan  Secondary to acute on chronic heart failure  Echocardiogram 12/27 -LVEF 25%, dilated right ventricle, severe tricuspid regurgitation  CXR pulmonary edema with possible L pleural effusion  Improving with diuretics  bumetanide, spironolactone with hold parameters.      1/7, worse, lasix added     CMML (chronic myelomonocytic leukemia) (HCC)- (present on admission)  Assessment & Plan  Persistent monocytosis for greater than 3 months  Bone marrow biopsy consistent with CMML-2 with 10 to 19% blasts.  Pending cytogenetics to risk stratify which will determine if chemotherapy will be beneficial    1/6, I discussed with oncology Dr. Kumar who does not think he has acute leukemia at this point, but overall has poor prognosis, recommended considering palliative/hospice or [facility].  1/7, patient is interested in hospice, referral has been placed    Acute on chronic congestive heart failure (HCC)- (present on admission)  Assessment & Plan  Echocardiogram 12/27 -LVEF 25%, dilated right ventricle, severe tricuspid regurgitation  Resume carvedilol, losartan, spironolactone with hold parameters.       Monocytosis- (present on admission)  Assessment & Plan  Marked monocytosis with erythroblasts/pRBCs, bone marrow biopsy recommended to exclude leukemia, myeloproliferative disorders  43.70% monocytes on 12/29 AM CBC  Bone marrow biopsy consistent with CMML, plan to [facility] with [facility]      Ulcer of great toe, right, with unspecified severity (HCC)  Assessment & Plan  Ulcer appears clean and dry, no bone penetration. Degenerative changes on x-ray but not correlated to area of ulcer, no osteomyelitis.  -No further wound care needs    Acute on chronic kidney injury.- (present on admission)  Assessment & Plan  Improving  Avoid nephrotoxins as much as possible, renally dose medications, monitor inputs and outputs     Chronic atrial fibrillation (HCC)- (present on admission)  Assessment & Plan  AICD for heart failure in place  Not on anticoagulation 2/2 easy bleeding and CML.   Resume carvedilol for rate control with hold parameters.    Tricuspid regurgitation- (present on admission)  Assessment & Plan  Echo 12/27 showed severe tricuspid regurgitation  Monitor and optimize volume status while inpatient  [facility]    Mitral regurgitation- (present on admission)  Assessment & Plan  History of, s/p mitral valve clip in 10/2020  Echocardiogram 12/27 showed mild mitral stenosis   Monitor and optimize volume status, follow-[facility] the [facility]     Bacteremia due to Escherichia coli  Assessment & Plan  S/p IV ceftriaxone & doxycycline       BPH (benign prostatic hyperplasia)- (present on admission)  Assessment & Plan  Continue home tamsulosin     Elevated liver enzymes- (present on admission)  Assessment & Plan  AST/ALT normal in 11/2020 at [facility], t bili was 2.4; AST//218, T bili 3.6 admission  History of heavy drinking per patient, none now per his report -though patient reports 12 to 24 pack use  Hepatitis panel negative  US liver ordered - cholelithiasis with borderline gallbladder wall thickening  Likely secondary to hepatic congestion from heart failure, and secondary to his myeloid leukemia  Counseled to stop alcohol, will need regular lab follow-up       VTE prophylaxis: Heparin SC

## 2021-01-07 NOTE — PROGRESS NOTES
Received change of shift report from day RN. Assumed pt. Care at 1900. Pt. aox4 but intermittently confused. Plan of care discussed with son at bedside, questions answered. Roxicodone given per MAR. Q2 turns. 1 L O2 via NC. Bed in lowest position with wheels locked. Call light within reach.

## 2021-01-08 LAB
ANION GAP SERPL CALC-SCNC: 13 MMOL/L (ref 7–16)
ANISOCYTOSIS BLD QL SMEAR: ABNORMAL
ANISOCYTOSIS BLD QL SMEAR: ABNORMAL
BASOPHILS # BLD AUTO: 0.9 % (ref 0–1.8)
BASOPHILS # BLD AUTO: 1.7 % (ref 0–1.8)
BASOPHILS # BLD: 0.11 K/UL (ref 0–0.12)
BASOPHILS # BLD: 0.23 K/UL (ref 0–0.12)
BUN SERPL-MCNC: 45 MG/DL (ref 8–22)
CALCIUM SERPL-MCNC: 8.4 MG/DL (ref 8.5–10.5)
CHLORIDE SERPL-SCNC: 101 MMOL/L (ref 96–112)
CO2 SERPL-SCNC: 19 MMOL/L (ref 20–33)
CREAT SERPL-MCNC: 2.01 MG/DL (ref 0.5–1.4)
DACRYOCYTES BLD QL SMEAR: NORMAL
EOSINOPHIL # BLD AUTO: 0 K/UL (ref 0–0.51)
EOSINOPHIL # BLD AUTO: 0 K/UL (ref 0–0.51)
EOSINOPHIL NFR BLD: 0 % (ref 0–6.9)
EOSINOPHIL NFR BLD: 0 % (ref 0–6.9)
ERYTHROCYTE [DISTWIDTH] IN BLOOD BY AUTOMATED COUNT: 82.9 FL (ref 35.9–50)
ERYTHROCYTE [DISTWIDTH] IN BLOOD BY AUTOMATED COUNT: 84.5 FL (ref 35.9–50)
GLUCOSE SERPL-MCNC: 106 MG/DL (ref 65–99)
HCT VFR BLD AUTO: 26 % (ref 42–52)
HCT VFR BLD AUTO: 26.7 % (ref 42–52)
HGB BLD-MCNC: 8 G/DL (ref 14–18)
HGB BLD-MCNC: 8.3 G/DL (ref 14–18)
HYPOCHROMIA BLD QL SMEAR: ABNORMAL
LYMPHOCYTES # BLD AUTO: 1.81 K/UL (ref 1–4.8)
LYMPHOCYTES # BLD AUTO: 1.93 K/UL (ref 1–4.8)
LYMPHOCYTES NFR BLD: 13.6 % (ref 22–41)
LYMPHOCYTES NFR BLD: 16.2 % (ref 22–41)
MACROCYTES BLD QL SMEAR: ABNORMAL
MACROCYTES BLD QL SMEAR: ABNORMAL
MANUAL DIFF BLD: NORMAL
MANUAL DIFF BLD: NORMAL
MCH RBC QN AUTO: 30.8 PG (ref 27–33)
MCH RBC QN AUTO: 31.8 PG (ref 27–33)
MCHC RBC AUTO-ENTMCNC: 30.8 G/DL (ref 33.7–35.3)
MCHC RBC AUTO-ENTMCNC: 31.1 G/DL (ref 33.7–35.3)
MCV RBC AUTO: 100 FL (ref 81.4–97.8)
MCV RBC AUTO: 102.3 FL (ref 81.4–97.8)
METAMYELOCYTES NFR BLD MANUAL: 0.9 %
MONOCYTES # BLD AUTO: 4.17 K/UL (ref 0–0.85)
MONOCYTES # BLD AUTO: 7.21 K/UL (ref 0–0.85)
MONOCYTES NFR BLD AUTO: 35 % (ref 0–13.4)
MONOCYTES NFR BLD AUTO: 54.2 % (ref 0–13.4)
MORPHOLOGY BLD-IMP: NORMAL
MORPHOLOGY BLD-IMP: NORMAL
MYELOCYTES NFR BLD MANUAL: 0.9 %
NEUTROPHILS # BLD AUTO: 4.06 K/UL (ref 1.82–7.42)
NEUTROPHILS # BLD AUTO: 5.5 K/UL (ref 1.82–7.42)
NEUTROPHILS NFR BLD: 30.5 % (ref 44–72)
NEUTROPHILS NFR BLD: 46.2 % (ref 44–72)
NRBC # BLD AUTO: 0.18 K/UL
NRBC # BLD AUTO: 0.18 K/UL
NRBC BLD-RTO: 1.3 /100 WBC
NRBC BLD-RTO: 1.5 /100 WBC
OVALOCYTES BLD QL SMEAR: NORMAL
PLATELET # BLD AUTO: 100 K/UL (ref 164–446)
PLATELET # BLD AUTO: 106 K/UL (ref 164–446)
PLATELET BLD QL SMEAR: NORMAL
PLATELET BLD QL SMEAR: NORMAL
PMV BLD AUTO: 10.8 FL (ref 9–12.9)
PMV BLD AUTO: 11 FL (ref 9–12.9)
POIKILOCYTOSIS BLD QL SMEAR: NORMAL
POTASSIUM SERPL-SCNC: 4.2 MMOL/L (ref 3.6–5.5)
RBC # BLD AUTO: 2.6 M/UL (ref 4.7–6.1)
RBC # BLD AUTO: 2.61 M/UL (ref 4.7–6.1)
RBC BLD AUTO: PRESENT
RBC BLD AUTO: PRESENT
SODIUM SERPL-SCNC: 133 MMOL/L (ref 135–145)
TARGETS BLD QL SMEAR: NORMAL
WBC # BLD AUTO: 11.9 K/UL (ref 4.8–10.8)
WBC # BLD AUTO: 13.3 K/UL (ref 4.8–10.8)

## 2021-01-08 PROCEDURE — 85027 COMPLETE CBC AUTOMATED: CPT

## 2021-01-08 PROCEDURE — A9270 NON-COVERED ITEM OR SERVICE: HCPCS | Performed by: STUDENT IN AN ORGANIZED HEALTH CARE EDUCATION/TRAINING PROGRAM

## 2021-01-08 PROCEDURE — 99232 SBSQ HOSP IP/OBS MODERATE 35: CPT | Performed by: HOSPITALIST

## 2021-01-08 PROCEDURE — 700102 HCHG RX REV CODE 250 W/ 637 OVERRIDE(OP): Performed by: STUDENT IN AN ORGANIZED HEALTH CARE EDUCATION/TRAINING PROGRAM

## 2021-01-08 PROCEDURE — 94760 N-INVAS EAR/PLS OXIMETRY 1: CPT

## 2021-01-08 PROCEDURE — 700111 HCHG RX REV CODE 636 W/ 250 OVERRIDE (IP): Performed by: HOSPITALIST

## 2021-01-08 PROCEDURE — 85007 BL SMEAR W/DIFF WBC COUNT: CPT

## 2021-01-08 PROCEDURE — 36415 COLL VENOUS BLD VENIPUNCTURE: CPT

## 2021-01-08 PROCEDURE — 770006 HCHG ROOM/CARE - MED/SURG/GYN SEMI*

## 2021-01-08 PROCEDURE — 700111 HCHG RX REV CODE 636 W/ 250 OVERRIDE (IP): Performed by: STUDENT IN AN ORGANIZED HEALTH CARE EDUCATION/TRAINING PROGRAM

## 2021-01-08 PROCEDURE — 80048 BASIC METABOLIC PNL TOTAL CA: CPT

## 2021-01-08 RX ADMIN — ACETAMINOPHEN 1000 MG: 500 TABLET ORAL at 12:36

## 2021-01-08 RX ADMIN — HEPARIN SODIUM 5000 UNITS: 5000 INJECTION, SOLUTION INTRAVENOUS; SUBCUTANEOUS at 06:09

## 2021-01-08 RX ADMIN — HEPARIN SODIUM 5000 UNITS: 5000 INJECTION, SOLUTION INTRAVENOUS; SUBCUTANEOUS at 17:16

## 2021-01-08 RX ADMIN — ACETAMINOPHEN 1000 MG: 500 TABLET ORAL at 06:09

## 2021-01-08 RX ADMIN — DOCUSATE SODIUM 50 MG AND SENNOSIDES 8.6 MG 2 TABLET: 8.6; 5 TABLET, FILM COATED ORAL at 17:15

## 2021-01-08 RX ADMIN — OXYCODONE HYDROCHLORIDE 10 MG: 10 TABLET ORAL at 20:19

## 2021-01-08 RX ADMIN — MORPHINE SULFATE 1 MG: 4 INJECTION INTRAVENOUS at 22:33

## 2021-01-08 RX ADMIN — ACETAMINOPHEN 1000 MG: 500 TABLET ORAL at 17:15

## 2021-01-08 RX ADMIN — FUROSEMIDE 20 MG: 10 INJECTION, SOLUTION INTRAMUSCULAR; INTRAVENOUS at 06:09

## 2021-01-08 RX ADMIN — OXYCODONE HYDROCHLORIDE 10 MG: 10 TABLET ORAL at 01:36

## 2021-01-08 RX ADMIN — ALLOPURINOL 150 MG: 300 TABLET ORAL at 06:10

## 2021-01-08 RX ADMIN — OXYCODONE HYDROCHLORIDE 10 MG: 10 TABLET ORAL at 12:37

## 2021-01-08 ASSESSMENT — ENCOUNTER SYMPTOMS
SORE THROAT: 0
ABDOMINAL PAIN: 0
CHILLS: 0
DIARRHEA: 0
EYE PAIN: 0
SPEECH CHANGE: 0
BRUISES/BLEEDS EASILY: 0
EYE REDNESS: 0
STRIDOR: 0
HEADACHES: 0
FLANK PAIN: 0
EYE DISCHARGE: 0
FOCAL WEAKNESS: 0
MYALGIAS: 1
BLOOD IN STOOL: 0
PALPITATIONS: 0
HALLUCINATIONS: 0
SEIZURES: 0
HEMOPTYSIS: 0
LOSS OF CONSCIOUSNESS: 0
COUGH: 0
FEVER: 0
NERVOUS/ANXIOUS: 0
DIZZINESS: 0
VOMITING: 0
SPUTUM PRODUCTION: 0

## 2021-01-08 ASSESSMENT — PAIN DESCRIPTION - PAIN TYPE
TYPE: ACUTE PAIN

## 2021-01-08 ASSESSMENT — PATIENT HEALTH QUESTIONNAIRE - PHQ9
SUM OF ALL RESPONSES TO PHQ9 QUESTIONS 1 AND 2: 0
2. FEELING DOWN, DEPRESSED, IRRITABLE, OR HOPELESS: NOT AT ALL
1. LITTLE INTEREST OR PLEASURE IN DOING THINGS: NOT AT ALL

## 2021-01-08 ASSESSMENT — FIBROSIS 4 INDEX: FIB4 SCORE: 3.51

## 2021-01-08 NOTE — DISCHARGE PLANNING
Anticipated Discharge Disposition:   · Placement with [facility]     Action:   · Pt was accepted with [facility] however will need placement for hospice services as pt is homeless (previously living on ex-wife's couch who refuses to have him return).   · Outreach call to Nirali with the [facility] [phone] to provide update regarding placement needs. Nirali requested notes be faxed to [phone].-Documents faxed.  · LSW reviewed with Abi Cox with [facility] who will also follow up with [facility] representative Nirali and has been in communication with ex-wife regarding steps required for placement.     Barriers to Discharge:   · LTC placement and coverage/funding source for LTC coverage    Plan:   · Care coordination will continue to follow up and provide assistance with discharge plans/barriers.

## 2021-01-08 NOTE — CARE PLAN
Problem: Communication  Goal: The ability to communicate needs accurately and effectively will improve  Outcome: PROGRESSING AS EXPECTED     Problem: Safety  Goal: Will remain free from falls  Outcome: PROGRESSING AS EXPECTED  Note: Patient is a high fall risk. Fall precautions in place. Educated patient on fall risk, patient expressed understanding.     Problem: Bowel/Gastric:  Goal: Normal bowel function is maintained or improved  Outcome: PROGRESSING AS EXPECTED  Flowsheets  Taken 1/7/2021 2100 by Tara Hendrix, C.N.A.  Last BM: 01/07/21  Taken 1/7/2021 0200 by Meeta Balbuena, C.N.A.  Number of Times Stooled: 1     Problem: Respiratory:  Goal: Respiratory status will improve  Outcome: PROGRESSING AS EXPECTED  Note: Patient is on 4L via nasal cannula. Patient frequently removes Oxygen, and . Educated on the importance of ensuring to keep both in place; reinforcement of education needed.

## 2021-01-08 NOTE — CARE PLAN
Problem: Discharge Barriers/Planning  Goal: Patient's continuum of care needs will be met  Outcome: PROGRESSING AS EXPECTED     Problem: Respiratory:  Goal: Respiratory status will improve  Outcome: PROGRESSING AS EXPECTED   Awaiting for final hospice acceptance. Remains on 5L O2. Denies any needs at this time. Will continue to monitor.

## 2021-01-08 NOTE — PROGRESS NOTES
"      Spiritual Care Note    Patient Information     Patient's Name: Stephen Ortiz   MRN: [id number]    YOB: 1935   Age and Gender: 85 y.o. male   Service Area: [place] [address]   Room (and Bed): [place] [address]   Ethnicity or Nationality:     Primary Language: English   Sikhism/Spiritual preference: Nothing in Particular   Place of Residence: [place]   Family/Friends/Others Present: No   Clinical Team Present: No   Medical Diagnosis(-es)/Procedure(s): Acute respiratory failure   Code Status: DNAR/DNI    Date of Admission: 12/27/2020   Length of Stay: 12 days        Spiritual Care Provider Information:  Name of Spiritual Care Provider: Barbara Leonardo  Title of Spiritual Care Provider: Associate   Phone Number: [phone]  E-mail: [email or web address]  Total time : 15 minutes    Spiritual Screen Results:    Gen Nursing        Palliative Care  PC Sikhism/Spiritual Screening  Is your spiritual health or inner well-being important to you as you cope with your medical condition?: No  Would you like to receive a visit from our Spiritual Care team or your own Christian or spiritual leader?: No  PC Sprituality screening comment: Pt declined visit from       Encounter/Request Information  Encounter/Request Type   Visited With: Patient  Nature of the Visit: Initial, On shift  General Visit: Yes  Referral From/ Origin of Request: [place] rounds, Verbal staff    Religous Needs/Values  Sikhism Needs Visit  Sikhism Needs: Prayer    Spiritual Assessment     Spiritual Care Encounters    Observations/Symptoms: Anger/Resentment, Frustration    Interaction/Conversation: RN referred the  to this pt, who was angry because he believed he had been promised a bed in the [facility] home at the end of his life, and has now been told he may not go there.  He stated that he \"doesn't know how to feel\" about going onto hospice. Asked what in his life has made him proudest, he said his five " adopted children. He welcomed prayer and thanked the .    Assessment: Need, Distress    Need: Seeking Spiritual Assistance and Support    Distress: Coping    Interventions: Compassionate presence, active listening, prayer.    Outcomes: Ability to Communicate with Truth and Honesty, Spiritual Comfort    Plan: Visit Upon Request    Notes:

## 2021-01-08 NOTE — PROGRESS NOTES
"Hospital Medicine Daily Progress Note    Date of Service  1/8/2021    Chief Complaint  85 y.o. male admitted 12/27/2020 with shortness of breath    Hospital Course  85 years old male with a past medical history of heart failure with reduced ejection fraction, atrial fibrillation, CML, prostate cancer s/p radiation admitted 12/23 with shortness of breath.  He was found to have acute hypoxemic respiratory failure saturating in the mid 80s at presentation.  Patient was diuresed with improvement of respiratory status.  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 Case discussed with oncology Dr. Kumar on 1/6, who do not think he has acute leukemia at this point, but overall has poor prognosis, recommended considering palliative[facility]hospice [facility].  Palliative care consultation placed. Patient is expressing interest in hospice, referral placed.    Interval Problem Update  Heart rate 60s-90s   Requiring 5 L of oxygen to achieve adequate saturation, encourage incentive spirometry, continue diuretics.  Hemoglobin 8, continue to monitor, transfuse for hemoglobin of less than or equal to 7.  Creatinine 2, up from 1.9   Patient accepted by [facility], will need placement, discussed with care coordination  Discussed with patient, patient's nurse and with multidisciplinary team during rounds including , pharmacist and charge nurse.      Consultants/Specialty  D/w oncology Dr Kumar \"Poor prognosis, not acute leukemia, consider palliative / f[facility]\"  Palliative care.       Code Status  DNAR/DNI    Disposition  Accepted by [facility], needs [facility], [facility]    Review of Systems  Review of Systems   Constitutional: Positive for malaise/fatigue. Negative for chills and fever.   HENT: Negative for congestion and sore throat.    Eyes: Negative for pain, discharge and redness.   Respiratory: Negative for " cough, hemoptysis, sputum production and stridor. Shortness of breath: Improved.    Cardiovascular: Positive for leg swelling. Negative for chest pain and palpitations.   Gastrointestinal: Negative for abdominal pain, blood in stool, diarrhea and vomiting.   Genitourinary: Negative for flank pain, hematuria and urgency.   Musculoskeletal: Positive for myalgias.   Skin: Negative.    Neurological: Negative for dizziness, speech change, focal weakness, seizures, loss of consciousness and headaches.   Endo/Heme/Allergies: Does not bruise/bleed easily.   Psychiatric/Behavioral: Negative for hallucinations and suicidal ideas. The patient is not nervous/anxious.       Physical Exam  Temp:  [36.6 °C (97.9 °F)-36.9 °C (98.4 °F)] 36.8 °C (98.3 °F)  Pulse:  [62-93] 93  Resp:  [18] 18  BP: ()/(65-76) 116/76  SpO2:  [94 %-97 %] 94 %    Physical Exam  Constitutional:       General: He is not in acute distress.     Appearance: He is not ill-appearing.   HENT:      Head: Normocephalic and atraumatic.      Right Ear: External ear normal.      Left Ear: External ear normal.      Mouth/Throat:      Mouth: Mucous membranes are moist.      Pharynx: No oropharyngeal exudate or posterior oropharyngeal erythema.   Eyes:      General: Scleral icterus present.      Extraocular Movements: Extraocular movements intact.      Pupils: Pupils are equal, round, and reactive to light.   Neck:      Musculoskeletal: Normal range of motion and neck supple.   Cardiovascular:      Rate and Rhythm: Regular rhythm. Tachycardia present.      Pulses: Normal pulses.      Heart sounds: Murmur present.   Pulmonary:      Effort: No respiratory distress.      Breath sounds: Rhonchi (Diffuse) present. No wheezing or rales.      Comments: Reduced air entry bilaterally basally  Abdominal:      General: Bowel sounds are normal. There is no distension.      Palpations: Abdomen is soft.      Tenderness: There is no abdominal tenderness. There is no guarding.    Musculoskeletal:         General: No swelling or tenderness.      Right lower leg: Edema present.      Left lower leg: Edema present.   Skin:     General: Skin is warm and dry.      Capillary Refill: Capillary refill takes 2 to 3 seconds.      Coloration: Skin is jaundiced and pale.      Findings: Bruising present.   Neurological:      General: No focal deficit present.      Mental Status: He is oriented to person, place, and time.      Sensory: No sensory deficit.      Motor: No weakness.   Psychiatric:         Mood and Affect: Mood normal.         Behavior: Behavior normal.       Fluids    Intake/Output Summary (Last 24 hours) at 1/8/2021 1519  Last data filed at 1/8/2021 1315  Gross per 24 hour   Intake 500 ml   Output 200 ml   Net 300 ml     Laboratory  Recent Labs     01/06/21  1255 01/07/21  1258 01/08/21  0216   WBC 9.6 13.3*  13.3* 11.9*   RBC 2.63* 2.61*  2.61* 2.60*   HEMOGLOBIN 8.1* 8.3*  8.3* 8.0*   HEMATOCRIT 26.4* 26.7*  26.7* 26.0*   .4* 102.3*  102.3* 100.0*   MCH 30.8 31.8  31.8 30.8   MCHC 30.7* 31.1*  31.1* 30.8*   RDW 82.9* 84.5*  84.5* 82.9*   PLATELETCT 79* 100*  100* 106*   MPV 11.4 10.8  10.8 11.0     Recent Labs     01/06/21  0059 01/07/21  1258 01/08/21  0216   SODIUM 136 136 133*   POTASSIUM 3.9 4.0 4.2   CHLORIDE 106 104 101   CO2 16* 18* 19*   GLUCOSE 106* 112* 106*   BUN 46* 44* 45*   CREATININE 1.86* 1.90* 2.01*   CALCIUM 8.5 8.6 8.4*                   Imaging  DX-CHEST-2 VIEWS   Final Result      1.  Low lung volumes   2.  Persistently enlarged cardiac silhouette   3.  Pulmonary edema and central vascular congestion   4.  Probable LEFT pleural effusion      DX-CHEST-PORTABLE (1 VIEW)   Final Result      No significant change from prior exam.      DX-TOE(S) 2+ RIGHT   Final Result      1.  Hallux valgus with bunion formation and severe degenerative change of RIGHT 1st metatarsophalangeal joint.   2.  No fracture or gross focal bony destruction.      US-RUQ   Final  Result      Cholelithiasis with borderline gallbladder wall thickening which may related to right heart failure (versus cholecystitis) as there is evidence of pleural fluid and ascites with bidirectional flow in the main portal vein.      Echogenic right kidney with renal cortical thinning consistent with medical renal disease.      EC-ECHOCARDIOGRAM COMPLETE W/O CONT   Final Result      NM-LUNG VENT/PERF IMAGING   Final Result      Low probability VQ scan.      US-EXTREMITY VENOUS LOWER BILAT   Final Result      DX-CHEST-PORTABLE (1 VIEW)   Final Result      1.  Cardiomegaly.      2.  Bibasilar atelectasis.         Assessment/Plan  * Acute respiratory failure with hypoxia (HCC)- (present on admission)  Assessment & Plan  Secondary to acute on chronic heart failure  Echocardiogram 12/27 -LVEF 25%, dilated right ventricle, severe tricuspid regurgitation  CXR pulmonary edema with possible L pleural effusion  Improving with diuretics  bumetanide, spironolactone with hold parameters.      1/7, worse, lasix added      CMML (chronic myelomonocytic leukemia) (HCC)- (present on admission)  Assessment & Plan  Persistent monocytosis for greater than 3 months  Bone marrow biopsy consistent with CMML-2 with 10 to 19% blasts.  Pending cytogenetics to risk stratify which will determine if chemotherapy will be beneficial    1/6, I discussed with oncology Dr. Kumar who does not think he has acute leukemia at this point, but overall has poor prognosis, recommended considering palliative[facility]hospice or [facility].  1/7, patient is interested in hospice, referral has been placed  1/8, accepted by [facility], needs placement    Acute on chronic congestive heart failure (HCC)- (present on admission)  Assessment & Plan  Echocardiogram 12/27 -LVEF 25%, dilated right ventricle, severe tricuspid regurgitation  Resume carvedilol, losartan, spironolactone with hold parameters.       Monocytosis- (present on  admission)  Assessment & Plan  Marked monocytosis with erythroblasts/pRBCs, bone marrow biopsy recommended to exclude leukemia, myeloproliferative disorders  43.70% monocytes on 12/29 AM CBC  Bone marrow biopsy consistent with CMML, [facility]     Ulcer of great toe, right, with unspecified severity (HCC)  Assessment & Plan  Ulcer appears clean and dry, no bone penetration. Degenerative changes on x-ray but not correlated to area of ulcer, no osteomyelitis.  -No further wound care needs    Acute on chronic kidney injury.- (present on admission)  Assessment & Plan  Improving  Avoid nephrotoxins as much as possible, renally dose medications, monitor inputs and outputs     Chronic atrial fibrillation (HCC)- (present on admission)  Assessment & Plan  AICD for heart failure in place  Not on anticoagulation 2/2 easy bleeding and CML.   Resume carvedilol for rate control with hold parameters.    Tricuspid regurgitation- (present on admission)  Assessment & Plan  Echo 12/27 showed severe tricuspid regurgitation  Monitor and optimize volume status while inpatient  [facility]    Mitral regurgitation- (present on admission)  Assessment & Plan  History of, s/p mitral valve clip in 10/2020  Echocardiogram 12/27 showed mild mitral stenosis   Monitor and optimize volume status, [facility]     Bacteremia due to Escherichia coli  Assessment & Plan  S/p IV ceftriaxone & doxycycline       BPH (benign prostatic hyperplasia)- (present on admission)  Assessment & Plan  Continue home tamsulosin     Elevated liver enzymes- (present on admission)  Assessment & Plan  AST/ALT normal in 11/2020 at [facility], t bili was 2.4; AST//218, T bili 3.6 admission  History of heavy drinking per patient, none now per his report -though patient reports 12 to 24 pack use  Hepatitis panel negative  US liver ordered - cholelithiasis with borderline gallbladder wall  thickening  Likely secondary to hepatic congestion from heart failure, and secondary to his myeloid leukemia  Counseled to stop alcohol, will need regular lab follow-up       VTE prophylaxis: Heparin SC

## 2021-01-08 NOTE — DISCHARGE PLANNING
Agency/Facility Name: [facility]  Spoke To: Magno  Outcome: pt accepted. Abi is working on getting O2 for pt to get home.

## 2021-01-08 NOTE — PROGRESS NOTES
Assumed care at 0700. Received bedside report from JUSTO Mckeon. Patient was asleep and resting comfortably in bed. No signs of distress. Bed is in low and locked position. Non-slip socks in place. Call light is within reach. Bed alarm is on.

## 2021-01-09 LAB
ANION GAP SERPL CALC-SCNC: 13 MMOL/L (ref 7–16)
ANISOCYTOSIS BLD QL SMEAR: ABNORMAL
BASOPHILS # BLD AUTO: 2.6 % (ref 0–1.8)
BASOPHILS # BLD: 0.28 K/UL (ref 0–0.12)
BUN SERPL-MCNC: 45 MG/DL (ref 8–22)
CALCIUM SERPL-MCNC: 8.5 MG/DL (ref 8.5–10.5)
CHLORIDE SERPL-SCNC: 100 MMOL/L (ref 96–112)
CO2 SERPL-SCNC: 19 MMOL/L (ref 20–33)
CREAT SERPL-MCNC: 2.03 MG/DL (ref 0.5–1.4)
EOSINOPHIL # BLD AUTO: 0 K/UL (ref 0–0.51)
EOSINOPHIL NFR BLD: 0 % (ref 0–6.9)
ERYTHROCYTE [DISTWIDTH] IN BLOOD BY AUTOMATED COUNT: 82.8 FL (ref 35.9–50)
GLUCOSE SERPL-MCNC: 125 MG/DL (ref 65–99)
HCT VFR BLD AUTO: 25.3 % (ref 42–52)
HGB BLD-MCNC: 7.9 G/DL (ref 14–18)
HYPOCHROMIA BLD QL SMEAR: ABNORMAL
LYMPHOCYTES # BLD AUTO: 1.61 K/UL (ref 1–4.8)
LYMPHOCYTES NFR BLD: 14.8 % (ref 22–41)
MACROCYTES BLD QL SMEAR: ABNORMAL
MANUAL DIFF BLD: NORMAL
MCH RBC QN AUTO: 31.2 PG (ref 27–33)
MCHC RBC AUTO-ENTMCNC: 31.2 G/DL (ref 33.7–35.3)
MCV RBC AUTO: 100 FL (ref 81.4–97.8)
MONOCYTES # BLD AUTO: 4.26 K/UL (ref 0–0.85)
MONOCYTES NFR BLD AUTO: 39.1 % (ref 0–13.4)
MORPHOLOGY BLD-IMP: NORMAL
NEUTROPHILS # BLD AUTO: 4.74 K/UL (ref 1.82–7.42)
NEUTROPHILS NFR BLD: 43.5 % (ref 44–72)
NRBC # BLD AUTO: 0.28 K/UL
NRBC BLD-RTO: 2.6 /100 WBC
OVALOCYTES BLD QL SMEAR: NORMAL
PLATELET # BLD AUTO: 125 K/UL (ref 164–446)
PLATELET BLD QL SMEAR: NORMAL
PMV BLD AUTO: 11.2 FL (ref 9–12.9)
POIKILOCYTOSIS BLD QL SMEAR: NORMAL
POLYCHROMASIA BLD QL SMEAR: NORMAL
POTASSIUM SERPL-SCNC: 4.3 MMOL/L (ref 3.6–5.5)
RBC # BLD AUTO: 2.53 M/UL (ref 4.7–6.1)
RBC BLD AUTO: PRESENT
SODIUM SERPL-SCNC: 132 MMOL/L (ref 135–145)
TARGETS BLD QL SMEAR: NORMAL
WBC # BLD AUTO: 10.9 K/UL (ref 4.8–10.8)

## 2021-01-09 PROCEDURE — A9270 NON-COVERED ITEM OR SERVICE: HCPCS | Performed by: STUDENT IN AN ORGANIZED HEALTH CARE EDUCATION/TRAINING PROGRAM

## 2021-01-09 PROCEDURE — 700111 HCHG RX REV CODE 636 W/ 250 OVERRIDE (IP): Performed by: HOSPITALIST

## 2021-01-09 PROCEDURE — 80048 BASIC METABOLIC PNL TOTAL CA: CPT

## 2021-01-09 PROCEDURE — 99232 SBSQ HOSP IP/OBS MODERATE 35: CPT | Performed by: HOSPITALIST

## 2021-01-09 PROCEDURE — A9270 NON-COVERED ITEM OR SERVICE: HCPCS | Performed by: HOSPITALIST

## 2021-01-09 PROCEDURE — 700102 HCHG RX REV CODE 250 W/ 637 OVERRIDE(OP): Performed by: HOSPITALIST

## 2021-01-09 PROCEDURE — 700111 HCHG RX REV CODE 636 W/ 250 OVERRIDE (IP): Performed by: STUDENT IN AN ORGANIZED HEALTH CARE EDUCATION/TRAINING PROGRAM

## 2021-01-09 PROCEDURE — 770006 HCHG ROOM/CARE - MED/SURG/GYN SEMI*

## 2021-01-09 PROCEDURE — 85007 BL SMEAR W/DIFF WBC COUNT: CPT

## 2021-01-09 PROCEDURE — 85027 COMPLETE CBC AUTOMATED: CPT

## 2021-01-09 PROCEDURE — 94760 N-INVAS EAR/PLS OXIMETRY 1: CPT

## 2021-01-09 PROCEDURE — 700102 HCHG RX REV CODE 250 W/ 637 OVERRIDE(OP): Performed by: STUDENT IN AN ORGANIZED HEALTH CARE EDUCATION/TRAINING PROGRAM

## 2021-01-09 PROCEDURE — 36415 COLL VENOUS BLD VENIPUNCTURE: CPT

## 2021-01-09 RX ADMIN — OXYCODONE HYDROCHLORIDE 10 MG: 10 TABLET ORAL at 06:24

## 2021-01-09 RX ADMIN — HEPARIN SODIUM 5000 UNITS: 5000 INJECTION, SOLUTION INTRAVENOUS; SUBCUTANEOUS at 17:44

## 2021-01-09 RX ADMIN — ACETAMINOPHEN 1000 MG: 500 TABLET ORAL at 06:24

## 2021-01-09 RX ADMIN — HEPARIN SODIUM 5000 UNITS: 5000 INJECTION, SOLUTION INTRAVENOUS; SUBCUTANEOUS at 06:24

## 2021-01-09 RX ADMIN — OXYCODONE HYDROCHLORIDE 10 MG: 10 TABLET ORAL at 02:14

## 2021-01-09 RX ADMIN — MORPHINE SULFATE 1 MG: 4 INJECTION INTRAVENOUS at 22:49

## 2021-01-09 RX ADMIN — OXYCODONE HYDROCHLORIDE 10 MG: 10 TABLET ORAL at 20:58

## 2021-01-09 RX ADMIN — TAMSULOSIN HYDROCHLORIDE 0.4 MG: 0.4 CAPSULE ORAL at 06:24

## 2021-01-09 RX ADMIN — SPIRONOLACTONE 25 MG: 25 TABLET ORAL at 06:24

## 2021-01-09 RX ADMIN — DOCUSATE SODIUM 50 MG AND SENNOSIDES 8.6 MG 2 TABLET: 8.6; 5 TABLET, FILM COATED ORAL at 17:43

## 2021-01-09 RX ADMIN — ALLOPURINOL 150 MG: 300 TABLET ORAL at 06:25

## 2021-01-09 RX ADMIN — ISOSORBIDE MONONITRATE 30 MG: 30 TABLET, EXTENDED RELEASE ORAL at 06:25

## 2021-01-09 RX ADMIN — BUMETANIDE 1 MG: 1 TABLET ORAL at 06:24

## 2021-01-09 RX ADMIN — OXYCODONE HYDROCHLORIDE 10 MG: 10 TABLET ORAL at 14:12

## 2021-01-09 ASSESSMENT — ENCOUNTER SYMPTOMS
SORE THROAT: 0
BLOOD IN STOOL: 0
EYE PAIN: 0
VOMITING: 0
FEVER: 0
COUGH: 0
EYE DISCHARGE: 0
BRUISES/BLEEDS EASILY: 0
PALPITATIONS: 0
SPUTUM PRODUCTION: 0
SEIZURES: 0
FLANK PAIN: 0
NERVOUS/ANXIOUS: 0
HALLUCINATIONS: 0
STRIDOR: 0
CHILLS: 0
ABDOMINAL PAIN: 0
EYE REDNESS: 0
DIZZINESS: 0
MYALGIAS: 1
HEMOPTYSIS: 0
LOSS OF CONSCIOUSNESS: 0
DIARRHEA: 0
SPEECH CHANGE: 0
HEADACHES: 0
FOCAL WEAKNESS: 0

## 2021-01-09 ASSESSMENT — PAIN DESCRIPTION - PAIN TYPE
TYPE: ACUTE PAIN

## 2021-01-09 NOTE — CARE PLAN
Problem: Communication  Goal: The ability to communicate needs accurately and effectively will improve  Outcome: PROGRESSING AS EXPECTED     Problem: Discharge Barriers/Planning  Goal: Patient's continuum of care needs will be met  Outcome: PROGRESSING AS EXPECTED     Still awaiting placement for pt to discharge with hospice. Remains on 5L O2. Incontinent. Tolerating diet. Bed locked and in lowest position.

## 2021-01-09 NOTE — PROGRESS NOTES
"Hospital Medicine Daily Progress Note    Date of Service  1/9/2021    Chief Complaint  85 y.o. male admitted 12/27/2020 with shortness of breath    Hospital Course  85 years old male with a past medical history of heart failure with reduced ejection fraction, atrial fibrillation, CML, prostate cancer s/p radiation admitted 12/23 with shortness of breath.  He was found to have acute hypoxemic respiratory failure saturating in the mid 80s at presentation.  Patient was diuresed with improvement of respiratory status.  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 Case discussed with oncology Dr. Kumar on 1/6, who do not think he has acute leukemia at this point, but overall has poor prognosis, recommended considering palliative[facility]hospice or [facility].  Palliative care consultation placed. Patient is expressing interest in hospice, referral placed.    Interval Problem Update  Heart rate 70s-80s   Requiring 5 L of oxygen to achieve adequate saturation, encourage incentive spirometry, continue diuretics.  Hemoglobin 7.9, continue to monitor, transfuse for hemoglobin of less than or equal to 7.  Creatinine still ~ 2,   Patient accepted by [facility], will need placement  Discussed with patient, patient's nurse      Consultants/Specialty  D/w oncology Dr Kumar \"Poor prognosis, not acute leukemia, consider palliative / [facility]\"  Palliative care.       Code Status  DNAR/DNI    Disposition  Accepted by [facility], needs placement, [facility]     Review of Systems  Review of Systems   Constitutional: Positive for malaise/fatigue. Negative for chills and fever.   HENT: Negative for congestion and sore throat.    Eyes: Negative for pain, discharge and redness.   Respiratory: Negative for cough, hemoptysis, sputum production and stridor. Shortness of breath: Improved.    Cardiovascular: Positive for leg swelling. Negative for " chest pain and palpitations.   Gastrointestinal: Negative for abdominal pain, blood in stool, diarrhea and vomiting.   Genitourinary: Negative for flank pain, hematuria and urgency.   Musculoskeletal: Positive for myalgias.   Skin: Negative.    Neurological: Negative for dizziness, speech change, focal weakness, seizures, loss of consciousness and headaches.   Endo/Heme/Allergies: Does not bruise/bleed easily.   Psychiatric/Behavioral: Negative for hallucinations and suicidal ideas. The patient is not nervous/anxious.       Physical Exam  Temp:  [36.1 °C (96.9 °F)-36.9 °C (98.4 °F)] 36.3 °C (97.3 °F)  Pulse:  [69-86] 75  Resp:  [17-20] 18  BP: (103-118)/(57-98) 118/98  SpO2:  [90 %-97 %] 97 %    Physical Exam  Constitutional:       General: He is not in acute distress.     Appearance: He is not ill-appearing.   HENT:      Head: Normocephalic and atraumatic.      Right Ear: External ear normal.      Left Ear: External ear normal.      Mouth/Throat:      Mouth: Mucous membranes are moist.      Pharynx: No oropharyngeal exudate or posterior oropharyngeal erythema.   Eyes:      General: Scleral icterus present.      Extraocular Movements: Extraocular movements intact.      Pupils: Pupils are equal, round, and reactive to light.   Neck:      Musculoskeletal: Normal range of motion and neck supple.   Cardiovascular:      Rate and Rhythm: Regular rhythm. Tachycardia present.      Pulses: Normal pulses.      Heart sounds: Murmur present.   Pulmonary:      Effort: No respiratory distress.      Breath sounds: Rhonchi (Diffuse) present. No wheezing or rales.      Comments: Reduced air entry bilaterally basally  Abdominal:      General: Bowel sounds are normal. There is no distension.      Palpations: Abdomen is soft.      Tenderness: There is no abdominal tenderness. There is no guarding.   Musculoskeletal:         General: No swelling or tenderness.      Right lower leg: Edema present.      Left lower leg: Edema present.    Skin:     General: Skin is warm and dry.      Capillary Refill: Capillary refill takes 2 to 3 seconds.      Coloration: Skin is jaundiced and pale.      Findings: Bruising present.   Neurological:      General: No focal deficit present.      Mental Status: He is oriented to person, place, and time.      Sensory: No sensory deficit.      Motor: No weakness.   Psychiatric:         Mood and Affect: Mood normal.         Behavior: Behavior normal.       Fluids    Intake/Output Summary (Last 24 hours) at 1/9/2021 1553  Last data filed at 1/8/2021 1714  Gross per 24 hour   Intake 240 ml   Output --   Net 240 ml     Laboratory  Recent Labs     01/07/21  1258 01/08/21  0216 01/09/21  0142   WBC 13.3*  13.3* 11.9* 10.9*   RBC 2.61*  2.61* 2.60* 2.53*   HEMOGLOBIN 8.3*  8.3* 8.0* 7.9*   HEMATOCRIT 26.7*  26.7* 26.0* 25.3*   .3*  102.3* 100.0* 100.0*   MCH 31.8  31.8 30.8 31.2   MCHC 31.1*  31.1* 30.8* 31.2*   RDW 84.5*  84.5* 82.9* 82.8*   PLATELETCT 100*  100* 106* 125*   MPV 10.8  10.8 11.0 11.2     Recent Labs     01/07/21  1258 01/08/21  0216 01/09/21  0142   SODIUM 136 133* 132*   POTASSIUM 4.0 4.2 4.3   CHLORIDE 104 101 100   CO2 18* 19* 19*   GLUCOSE 112* 106* 125*   BUN 44* 45* 45*   CREATININE 1.90* 2.01* 2.03*   CALCIUM 8.6 8.4* 8.5                   Imaging  DX-CHEST-2 VIEWS   Final Result      1.  Low lung volumes   2.  Persistently enlarged cardiac silhouette   3.  Pulmonary edema and central vascular congestion   4.  Probable LEFT pleural effusion      DX-CHEST-PORTABLE (1 VIEW)   Final Result      No significant change from prior exam.      DX-TOE(S) 2+ RIGHT   Final Result      1.  Hallux valgus with bunion formation and severe degenerative change of RIGHT 1st metatarsophalangeal joint.   2.  No fracture or gross focal bony destruction.      US-RUQ   Final Result      Cholelithiasis with borderline gallbladder wall thickening which may related to right heart failure (versus cholecystitis) as  there is evidence of pleural fluid and ascites with bidirectional flow in the main portal vein.      Echogenic right kidney with renal cortical thinning consistent with medical renal disease.      EC-ECHOCARDIOGRAM COMPLETE W/O CONT   Final Result      NM-LUNG VENT/PERF IMAGING   Final Result      Low probability VQ scan.      US-EXTREMITY VENOUS LOWER BILAT   Final Result      DX-CHEST-PORTABLE (1 VIEW)   Final Result      1.  Cardiomegaly.      2.  Bibasilar atelectasis.         Assessment/Plan  * Acute respiratory failure with hypoxia (HCC)- (present on admission)  Assessment & Plan  Secondary to acute on chronic heart failure  Echocardiogram 12/27 -LVEF 25%, dilated right ventricle, severe tricuspid regurgitation  CXR pulmonary edema with possible L pleural effusion  Improving with diuretics  bumetanide, spironolactone with hold parameters.      1/7, worse, lasix added     1/8-9, stable     CMML (chronic myelomonocytic leukemia) (HCC)- (present on admission)  Assessment & Plan  Persistent monocytosis for greater than 3 months  Bone marrow biopsy consistent with CMML-2 with 10 to 19% blasts.  Pending cytogenetics to risk stratify which will determine if chemotherapy will be beneficial    1/6, I discussed with oncology Dr. Kumar who does not think he has acute leukemia at this point, but overall has poor prognosis, recommended considering palliative[facility]hospice [facility].  1/7, patient is interested in hospice, referral has been placed  1/8, accepted by [facility], needs placement    Acute on chronic congestive heart failure (HCC)- (present on admission)  Assessment & Plan  Echocardiogram 12/27 -LVEF 25%, dilated right ventricle, severe tricuspid regurgitation  Resume carvedilol, losartan, spironolactone with hold parameters.       Monocytosis- (present on admission)  Assessment & Plan  Marked monocytosis with erythroblasts/pRBCs, bone marrow biopsy recommended to exclude leukemia,  myeloproliferative disorders  43.70% monocytes on 12/29 AM CBC  Bone marrow biopsy consistent with CMML, [facility]     Ulcer of great toe, right, with unspecified severity (HCC)  Assessment & Plan  Ulcer appears clean and dry, no bone penetration. Degenerative changes on x-ray but not correlated to area of ulcer, no osteomyelitis.  -No further wound care needs    Acute on chronic kidney injury.- (present on admission)  Assessment & Plan  Improving  Avoid nephrotoxins as much as possible, renally dose medications, monitor inputs and outputs     Chronic atrial fibrillation (HCC)- (present on admission)  Assessment & Plan  AICD for heart failure in place  Not on anticoagulation 2/2 easy bleeding and CML.   Resume carvedilol for rate control with hold parameters.     Tricuspid regurgitation- (present on admission)  Assessment & Plan  Echo 12/27 showed severe tricuspid regurgitation  Monitor and optimize volume status while inpatient  [facility]    Mitral regurgitation- (present on admission)  Assessment & Plan  History of, s/p mitral valve clip in 10/2020  Echocardiogram 12/27 showed mild mitral stenosis   Monitor and optimize volume status, [facility]     Bacteremia due to Escherichia coli  Assessment & Plan  S/p IV ceftriaxone & doxycycline       BPH (benign prostatic hyperplasia)- (present on admission)  Assessment & Plan  Continue home tamsulosin     Elevated liver enzymes- (present on admission)  Assessment & Plan  AST/ALT normal in 11/2020 at [facility], t bili was 2.4; AST//218, T bili 3.6 admission  History of heavy drinking per patient, none now per his report -though patient reports 12 to 24 pack use  Hepatitis panel negative  US liver ordered - cholelithiasis with borderline gallbladder wall thickening  Likely secondary to hepatic congestion from heart failure, and secondary to his myeloid leukemia  Counseled to stop alcohol, will  need regular lab follow-up       VTE prophylaxis: Heparin SC

## 2021-01-09 NOTE — PROGRESS NOTES
Bedside report received.  Assessment complete.  A&O x 4 with intermittent confusion. Patient calls appropriately.  Patient bed bound per report. Bed alarm present.   Patient has 8/10 pain. Pain managed with prescribed medications.  Denies N&V. Tolerating Cardiac/Renal diet.  Surgical patient to monitor.   + void, + flatus, + BM.  Patient denies SOB     Review plan with of care with patient. Call light and personal belongings with in reach. Hourly rounding in place. All needs met at this time.

## 2021-01-09 NOTE — CARE PLAN
Problem: Skin Integrity  Goal: Risk for impaired skin integrity will decrease  Outcome: PROGRESSING AS EXPECTED     Problem: Pain Management  Goal: Pain level will decrease to patient's comfort goal  Outcome: PROGRESSING AS EXPECTED     Problem: Mobility  Goal: Risk for activity intolerance will decrease  Outcome: PROGRESSING AS EXPECTED

## 2021-01-10 LAB
ANION GAP SERPL CALC-SCNC: 9 MMOL/L (ref 7–16)
BASOPHILS # BLD AUTO: 0.2 % (ref 0–1.8)
BASOPHILS # BLD: 0.02 K/UL (ref 0–0.12)
BUN SERPL-MCNC: 40 MG/DL (ref 8–22)
CALCIUM SERPL-MCNC: 8.6 MG/DL (ref 8.5–10.5)
CHLORIDE SERPL-SCNC: 104 MMOL/L (ref 96–112)
CO2 SERPL-SCNC: 23 MMOL/L (ref 20–33)
CREAT SERPL-MCNC: 1.8 MG/DL (ref 0.5–1.4)
EOSINOPHIL # BLD AUTO: 0.01 K/UL (ref 0–0.51)
EOSINOPHIL NFR BLD: 0.1 % (ref 0–6.9)
ERYTHROCYTE [DISTWIDTH] IN BLOOD BY AUTOMATED COUNT: 84 FL (ref 35.9–50)
GLUCOSE SERPL-MCNC: 109 MG/DL (ref 65–99)
HCT VFR BLD AUTO: 25.8 % (ref 42–52)
HGB BLD-MCNC: 7.9 G/DL (ref 14–18)
IMM GRANULOCYTES # BLD AUTO: 0.22 K/UL (ref 0–0.11)
IMM GRANULOCYTES NFR BLD AUTO: 2.3 % (ref 0–0.9)
LYMPHOCYTES # BLD AUTO: 1.24 K/UL (ref 1–4.8)
LYMPHOCYTES NFR BLD: 13 % (ref 22–41)
MCH RBC QN AUTO: 30.7 PG (ref 27–33)
MCHC RBC AUTO-ENTMCNC: 30.6 G/DL (ref 33.7–35.3)
MCV RBC AUTO: 100.4 FL (ref 81.4–97.8)
MONOCYTES # BLD AUTO: 4.29 K/UL (ref 0–0.85)
MONOCYTES NFR BLD AUTO: 45.1 % (ref 0–13.4)
NEUTROPHILS # BLD AUTO: 3.74 K/UL (ref 1.82–7.42)
NEUTROPHILS NFR BLD: 39.3 % (ref 44–72)
NRBC # BLD AUTO: 0.17 K/UL
NRBC BLD-RTO: 1.8 /100 WBC
PLATELET # BLD AUTO: 123 K/UL (ref 164–446)
PMV BLD AUTO: 10.3 FL (ref 9–12.9)
POTASSIUM SERPL-SCNC: 3.8 MMOL/L (ref 3.6–5.5)
RBC # BLD AUTO: 2.57 M/UL (ref 4.7–6.1)
SODIUM SERPL-SCNC: 136 MMOL/L (ref 135–145)
WBC # BLD AUTO: 9.5 K/UL (ref 4.8–10.8)

## 2021-01-10 PROCEDURE — A9270 NON-COVERED ITEM OR SERVICE: HCPCS | Performed by: HOSPITALIST

## 2021-01-10 PROCEDURE — 700102 HCHG RX REV CODE 250 W/ 637 OVERRIDE(OP): Performed by: STUDENT IN AN ORGANIZED HEALTH CARE EDUCATION/TRAINING PROGRAM

## 2021-01-10 PROCEDURE — 770006 HCHG ROOM/CARE - MED/SURG/GYN SEMI*

## 2021-01-10 PROCEDURE — 700102 HCHG RX REV CODE 250 W/ 637 OVERRIDE(OP): Performed by: HOSPITALIST

## 2021-01-10 PROCEDURE — 36415 COLL VENOUS BLD VENIPUNCTURE: CPT

## 2021-01-10 PROCEDURE — 700101 HCHG RX REV CODE 250: Performed by: STUDENT IN AN ORGANIZED HEALTH CARE EDUCATION/TRAINING PROGRAM

## 2021-01-10 PROCEDURE — 99231 SBSQ HOSP IP/OBS SF/LOW 25: CPT | Performed by: HOSPITALIST

## 2021-01-10 PROCEDURE — A9270 NON-COVERED ITEM OR SERVICE: HCPCS | Performed by: STUDENT IN AN ORGANIZED HEALTH CARE EDUCATION/TRAINING PROGRAM

## 2021-01-10 PROCEDURE — 97116 GAIT TRAINING THERAPY: CPT

## 2021-01-10 PROCEDURE — 80048 BASIC METABOLIC PNL TOTAL CA: CPT

## 2021-01-10 PROCEDURE — 85025 COMPLETE CBC W/AUTO DIFF WBC: CPT

## 2021-01-10 PROCEDURE — 700111 HCHG RX REV CODE 636 W/ 250 OVERRIDE (IP): Performed by: HOSPITALIST

## 2021-01-10 RX ADMIN — OXYCODONE HYDROCHLORIDE 10 MG: 10 TABLET ORAL at 15:37

## 2021-01-10 RX ADMIN — ISOSORBIDE MONONITRATE 30 MG: 30 TABLET, EXTENDED RELEASE ORAL at 05:48

## 2021-01-10 RX ADMIN — OXYCODONE HYDROCHLORIDE 10 MG: 10 TABLET ORAL at 19:43

## 2021-01-10 RX ADMIN — ALLOPURINOL 150 MG: 300 TABLET ORAL at 05:47

## 2021-01-10 RX ADMIN — DOCUSATE SODIUM 50 MG AND SENNOSIDES 8.6 MG 2 TABLET: 8.6; 5 TABLET, FILM COATED ORAL at 05:48

## 2021-01-10 RX ADMIN — OXYCODONE HYDROCHLORIDE 10 MG: 10 TABLET ORAL at 08:12

## 2021-01-10 RX ADMIN — LOSARTAN POTASSIUM 25 MG: 25 TABLET, FILM COATED ORAL at 05:48

## 2021-01-10 RX ADMIN — CARVEDILOL 3.12 MG: 3.12 TABLET, FILM COATED ORAL at 05:47

## 2021-01-10 RX ADMIN — OXYCODONE HYDROCHLORIDE 10 MG: 10 TABLET ORAL at 02:55

## 2021-01-10 RX ADMIN — CARVEDILOL 3.12 MG: 3.12 TABLET, FILM COATED ORAL at 17:37

## 2021-01-10 RX ADMIN — HEPARIN SODIUM 5000 UNITS: 5000 INJECTION, SOLUTION INTRAVENOUS; SUBCUTANEOUS at 05:46

## 2021-01-10 RX ADMIN — BUMETANIDE 1 MG: 1 TABLET ORAL at 05:49

## 2021-01-10 RX ADMIN — POLYETHYLENE GLYCOL 3350 1 PACKET: 17 POWDER, FOR SOLUTION ORAL at 05:46

## 2021-01-10 RX ADMIN — SPIRONOLACTONE 25 MG: 25 TABLET ORAL at 05:48

## 2021-01-10 RX ADMIN — TAMSULOSIN HYDROCHLORIDE 0.4 MG: 0.4 CAPSULE ORAL at 05:47

## 2021-01-10 RX ADMIN — DOCUSATE SODIUM 50 MG AND SENNOSIDES 8.6 MG 2 TABLET: 8.6; 5 TABLET, FILM COATED ORAL at 17:37

## 2021-01-10 ASSESSMENT — ENCOUNTER SYMPTOMS
HEADACHES: 0
LOSS OF CONSCIOUSNESS: 0
HALLUCINATIONS: 0
EYE PAIN: 0
CHILLS: 0
VOMITING: 0
DIZZINESS: 0
FEVER: 0
PALPITATIONS: 0
STRIDOR: 0
SPUTUM PRODUCTION: 0
NERVOUS/ANXIOUS: 0
BRUISES/BLEEDS EASILY: 0
FOCAL WEAKNESS: 0
HEMOPTYSIS: 0
FLANK PAIN: 0
ABDOMINAL PAIN: 0
DIARRHEA: 0
COUGH: 0
BLOOD IN STOOL: 0
SEIZURES: 0
EYE DISCHARGE: 0
MYALGIAS: 1
SPEECH CHANGE: 0
SORE THROAT: 0
EYE REDNESS: 0

## 2021-01-10 ASSESSMENT — COGNITIVE AND FUNCTIONAL STATUS - GENERAL
WALKING IN HOSPITAL ROOM: A LITTLE
CLIMB 3 TO 5 STEPS WITH RAILING: A LOT
TURNING FROM BACK TO SIDE WHILE IN FLAT BAD: A LITTLE
STANDING UP FROM CHAIR USING ARMS: A LITTLE
SUGGESTED CMS G CODE MODIFIER MOBILITY: CK
MOBILITY SCORE: 17
MOVING TO AND FROM BED TO CHAIR: A LITTLE
MOVING FROM LYING ON BACK TO SITTING ON SIDE OF FLAT BED: A LITTLE

## 2021-01-10 ASSESSMENT — PAIN DESCRIPTION - PAIN TYPE
TYPE: ACUTE PAIN
TYPE: ACUTE PAIN

## 2021-01-10 ASSESSMENT — GAIT ASSESSMENTS
DISTANCE (FEET): 15
DEVIATION: ANTALGIC;BRADYKINETIC;SHUFFLED GAIT;OTHER (COMMENT)
ASSISTIVE DEVICE: FRONT WHEEL WALKER
GAIT LEVEL OF ASSIST: MINIMAL ASSIST

## 2021-01-10 NOTE — CARE PLAN
Problem: Communication  Goal: The ability to communicate needs accurately and effectively will improve  Outcome: PROGRESSING AS EXPECTED  Note: Pt uses call light to effectively communicate needs     Problem: Safety  Goal: Will remain free from falls  Outcome: PROGRESSING AS EXPECTED  Note: Pt does not make attempts to leave his bed/ambulate

## 2021-01-10 NOTE — PROGRESS NOTES
Hospital Medicine Daily Progress Note    Date of Service  1/10/2021    Chief Complaint  85 y.o. male admitted 12/27/2020 with shortness of breath    Hospital Course  85 years old male with a past medical history of heart failure with reduced ejection fraction, atrial fibrillation, CML, prostate cancer s/p radiation admitted 12/23 with shortness of breath.  He was found to have acute hypoxemic respiratory failure saturating in the mid 80s at presentation.  Patient was diuresed with improvement of respiratory status.  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 Case discussed with oncology Dr. Kumar on 1/6, who do not think he has acute leukemia at this point, but overall has poor prognosis, recommended considering palliative[facility]hospice [facility].  Palliative care consultation placed. Patient is expressing interest in hospice, referral placed.    Interval Problem Update  Heart rate 70s-80s    Hemoglobin 7.9, unchanged from yesterday, continue to monitor, transfuse for hemoglobin of less than or equal to 7.  Creatinine 1.8, down from 2 yesterday   Requiring 5 L of oxygen to achieve adequate saturation, encourage incentive spirometry, continue diuretics.  Patient accepted by [facility], will need placement   Discussed with patient, patient's nurse        Consultants/Specialty  D/w oncology Dr Kumar   Palliative care.       Code Status  DNAR/DNI     Disposition  [facility], needs [facility], [facility]     Review of Systems  Review of Systems   Constitutional: Positive for malaise/fatigue. Negative for chills and fever.   HENT: Negative for congestion and sore throat.    Eyes: Negative for pain, discharge and redness.   Respiratory: Negative for cough, hemoptysis, sputum production and stridor. Shortness of breath: Improved.    Cardiovascular: Positive for leg swelling. Negative for chest pain and  palpitations.   Gastrointestinal: Negative for abdominal pain, blood in stool, diarrhea and vomiting.   Genitourinary: Negative for flank pain, hematuria and urgency.   Musculoskeletal: Positive for myalgias.   Skin: Negative.    Neurological: Negative for dizziness, speech change, focal weakness, seizures, loss of consciousness and headaches.   Endo/Heme/Allergies: Does not bruise/bleed easily.   Psychiatric/Behavioral: Negative for hallucinations and suicidal ideas. The patient is not nervous/anxious.       Physical Exam  Temp:  [36.5 °C (97.7 °F)-37 °C (98.6 °F)] 36.6 °C (97.9 °F)  Pulse:  [73-82] 73  Resp:  [14-18] 17  BP: (102-116)/(56-78) 105/78  SpO2:  [94 %-97 %] 97 %    Physical Exam  Constitutional:       General: He is not in acute distress.     Appearance: He is not ill-appearing.   HENT:      Head: Normocephalic and atraumatic.      Right Ear: External ear normal.      Left Ear: External ear normal.      Mouth/Throat:      Mouth: Mucous membranes are moist.      Pharynx: No oropharyngeal exudate or posterior oropharyngeal erythema.   Eyes:      General: Scleral icterus present.      Extraocular Movements: Extraocular movements intact.      Pupils: Pupils are equal, round, and reactive to light.   Neck:      Musculoskeletal: Normal range of motion and neck supple.   Cardiovascular:      Rate and Rhythm: Regular rhythm. Tachycardia present.      Pulses: Normal pulses.      Heart sounds: Murmur present.   Pulmonary:      Effort: No respiratory distress.      Breath sounds: Rhonchi (Diffuse) present. No wheezing or rales.      Comments: Reduced air entry bilaterally basally  Abdominal:      General: Bowel sounds are normal. There is no distension.      Palpations: Abdomen is soft.      Tenderness: There is no abdominal tenderness. There is no guarding.   Musculoskeletal:         General: No swelling or tenderness.      Right lower leg: Edema present.      Left lower leg: Edema present.   Skin:     General:  Skin is warm and dry.      Capillary Refill: Capillary refill takes 2 to 3 seconds.      Coloration: Skin is jaundiced and pale.      Findings: Bruising present.   Neurological:      General: No focal deficit present.      Mental Status: He is oriented to person, place, and time.      Sensory: No sensory deficit.      Motor: No weakness.   Psychiatric:         Mood and Affect: Mood normal.         Behavior: Behavior normal.       Fluids    Intake/Output Summary (Last 24 hours) at 1/10/2021 1425  Last data filed at 1/10/2021 0856  Gross per 24 hour   Intake 480 ml   Output --   Net 480 ml     Laboratory  Recent Labs     01/08/21 0216 01/09/21  0142 01/10/21  0121   WBC 11.9* 10.9* 9.5   RBC 2.60* 2.53* 2.57*   HEMOGLOBIN 8.0* 7.9* 7.9*   HEMATOCRIT 26.0* 25.3* 25.8*   .0* 100.0* 100.4*   MCH 30.8 31.2 30.7   MCHC 30.8* 31.2* 30.6*   RDW 82.9* 82.8* 84.0*   PLATELETCT 106* 125* 123*   MPV 11.0 11.2 10.3     Recent Labs     01/08/21 0216 01/09/21  0142 01/10/21  0121   SODIUM 133* 132* 136   POTASSIUM 4.2 4.3 3.8   CHLORIDE 101 100 104   CO2 19* 19* 23   GLUCOSE 106* 125* 109*   BUN 45* 45* 40*   CREATININE 2.01* 2.03* 1.80*   CALCIUM 8.4* 8.5 8.6                   Imaging  DX-CHEST-2 VIEWS   Final Result      1.  Low lung volumes   2.  Persistently enlarged cardiac silhouette   3.  Pulmonary edema and central vascular congestion   4.  Probable LEFT pleural effusion      DX-CHEST-PORTABLE (1 VIEW)   Final Result      No significant change from prior exam.      DX-TOE(S) 2+ RIGHT   Final Result      1.  Hallux valgus with bunion formation and severe degenerative change of RIGHT 1st metatarsophalangeal joint.   2.  No fracture or gross focal bony destruction.      US-RUQ   Final Result      Cholelithiasis with borderline gallbladder wall thickening which may related to right heart failure (versus cholecystitis) as there is evidence of pleural fluid and ascites with bidirectional flow in the main portal vein.       Echogenic right kidney with renal cortical thinning consistent with medical renal disease.      EC-ECHOCARDIOGRAM COMPLETE W/O CONT   Final Result      NM-LUNG VENT/PERF IMAGING   Final Result      Low probability VQ scan.      US-EXTREMITY VENOUS LOWER BILAT   Final Result      DX-CHEST-PORTABLE (1 VIEW)   Final Result      1.  Cardiomegaly.      2.  Bibasilar atelectasis.         Assessment/Plan  * Acute respiratory failure with hypoxia (HCC)- (present on admission)  Assessment & Plan  Secondary to acute on chronic heart failure  Echocardiogram 12/27 -LVEF 25%, dilated right ventricle, severe tricuspid regurgitation  CXR pulmonary edema with possible L pleural effusion  Improving with diuretics  bumetanide, spironolactone with hold parameters.      1/7, worse, lasix added     1/8-10, stable  On ~ 4-5 L     CMML (chronic myelomonocytic leukemia) (HCC)- (present on admission)  Assessment & Plan  Persistent monocytosis for greater than 3 months  Bone marrow biopsy consistent with CMML-2 with 10 to 19% blasts.  Pending cytogenetics to risk stratify which will determine if chemotherapy will be beneficial    1/6, I discussed with oncology Dr. Kumar who does not think he has acute leukemia at this point, but overall has poor prognosis, recommended considering palliative[facility]hospice or [facility].  1/7, patient is interested in hospice, referral has been placed  1/8, accepted by [facility], needs placement    Acute on chronic congestive heart failure (HCC)- (present on admission)  Assessment & Plan  Echocardiogram 12/27 -LVEF 25%, dilated right ventricle, severe tricuspid regurgitation  Resume carvedilol, losartan, spironolactone with hold parameters.       Monocytosis- (present on admission)  Assessment & Plan  Marked monocytosis with erythroblasts/pRBCs, bone marrow biopsy recommended to exclude leukemia, myeloproliferative disorders  43.70% monocytes on 12/29 AM CBC  Bone marrow biopsy consistent  with CMML, plan [facility]     Ulcer of great toe, right, with unspecified severity (HCC)  Assessment & Plan  Ulcer appears clean and dry, no bone penetration. Degenerative changes on x-ray but not correlated to area of ulcer, no osteomyelitis.  -No further wound care needs    Acute on chronic kidney injury.- (present on admission)  Assessment & Plan  Improving  Avoid nephrotoxins as much as possible, renally dose medications, monitor inputs and outputs     Chronic atrial fibrillation (HCC)- (present on admission)  Assessment & Plan  AICD for heart failure in place  Not on anticoagulation 2/2 easy bleeding and CML.   Resume carvedilol for rate control with hold parameters.     Tricuspid regurgitation- (present on admission)  Assessment & Plan  Echo 12/27 showed severe tricuspid regurgitation  Monitor and optimize volume status while inpatient  [facility]    Mitral regurgitation- (present on admission)  Assessment & Plan  History of, s/p mitral valve clip in 10/2020  Echocardiogram 12/27 showed mild mitral stenosis   Monitor and optimize volume status, [facility]      Bacteremia due to Escherichia coli  Assessment & Plan  S/p IV ceftriaxone & doxycycline       BPH (benign prostatic hyperplasia)- (present on admission)  Assessment & Plan  Continue home tamsulosin     Elevated liver enzymes- (present on admission)  Assessment & Plan  AST/ALT normal in 11/2020 at [facility], t bili was 2.4; AST//218, T bili 3.6 admission  History of heavy drinking per patient, none now per his report -though patient reports 12 to 24 pack use  Hepatitis panel negative  US liver ordered - cholelithiasis with borderline gallbladder wall thickening  Likely secondary to hepatic congestion from heart failure, and secondary to his myeloid leukemia  Counseled to stop alcohol, will need regular lab follow-up       VTE prophylaxis: Heparin SC

## 2021-01-10 NOTE — PROGRESS NOTES
Report received from dayshift RN. Pt is AOx4. Able to make needs known. On 5L supplemental oxygen. No needs at this time

## 2021-01-11 LAB
ANION GAP SERPL CALC-SCNC: 12 MMOL/L (ref 7–16)
ANISOCYTOSIS BLD QL SMEAR: ABNORMAL
BASOPHILS # BLD AUTO: 0.9 % (ref 0–1.8)
BASOPHILS # BLD: 0.1 K/UL (ref 0–0.12)
BUN SERPL-MCNC: 35 MG/DL (ref 8–22)
BURR CELLS BLD QL SMEAR: NORMAL
CALCIUM SERPL-MCNC: 8.6 MG/DL (ref 8.5–10.5)
CHLORIDE SERPL-SCNC: 101 MMOL/L (ref 96–112)
CO2 SERPL-SCNC: 22 MMOL/L (ref 20–33)
CREAT SERPL-MCNC: 1.81 MG/DL (ref 0.5–1.4)
EOSINOPHIL # BLD AUTO: 0 K/UL (ref 0–0.51)
EOSINOPHIL NFR BLD: 0 % (ref 0–6.9)
ERYTHROCYTE [DISTWIDTH] IN BLOOD BY AUTOMATED COUNT: 85.9 FL (ref 35.9–50)
GLUCOSE SERPL-MCNC: 102 MG/DL (ref 65–99)
HCT VFR BLD AUTO: 24.9 % (ref 42–52)
HGB BLD-MCNC: 7.7 G/DL (ref 14–18)
HYPOCHROMIA BLD QL SMEAR: ABNORMAL
LYMPHOCYTES # BLD AUTO: 1.34 K/UL (ref 1–4.8)
LYMPHOCYTES NFR BLD: 12.2 % (ref 22–41)
MACROCYTES BLD QL SMEAR: ABNORMAL
MANUAL DIFF BLD: NORMAL
MCH RBC QN AUTO: 31.6 PG (ref 27–33)
MCHC RBC AUTO-ENTMCNC: 30.9 G/DL (ref 33.7–35.3)
MCV RBC AUTO: 102 FL (ref 81.4–97.8)
MICROCYTES BLD QL SMEAR: ABNORMAL
MONOCYTES # BLD AUTO: 2.87 K/UL (ref 0–0.85)
MONOCYTES NFR BLD AUTO: 26.1 % (ref 0–13.4)
MORPHOLOGY BLD-IMP: NORMAL
NEUTROPHILS # BLD AUTO: 6.7 K/UL (ref 1.82–7.42)
NEUTROPHILS NFR BLD: 60.9 % (ref 44–72)
NRBC # BLD AUTO: 0.11 K/UL
NRBC BLD-RTO: 1 /100 WBC
PLATELET # BLD AUTO: 122 K/UL (ref 164–446)
PLATELET BLD QL SMEAR: NORMAL
PMV BLD AUTO: 10.3 FL (ref 9–12.9)
POLYCHROMASIA BLD QL SMEAR: NORMAL
POTASSIUM SERPL-SCNC: 3.6 MMOL/L (ref 3.6–5.5)
RBC # BLD AUTO: 2.44 M/UL (ref 4.7–6.1)
RBC BLD AUTO: PRESENT
SODIUM SERPL-SCNC: 135 MMOL/L (ref 135–145)
TARGETS BLD QL SMEAR: NORMAL
WBC # BLD AUTO: 11 K/UL (ref 4.8–10.8)

## 2021-01-11 PROCEDURE — 700102 HCHG RX REV CODE 250 W/ 637 OVERRIDE(OP): Performed by: STUDENT IN AN ORGANIZED HEALTH CARE EDUCATION/TRAINING PROGRAM

## 2021-01-11 PROCEDURE — 99231 SBSQ HOSP IP/OBS SF/LOW 25: CPT | Performed by: HOSPITALIST

## 2021-01-11 PROCEDURE — A9270 NON-COVERED ITEM OR SERVICE: HCPCS | Performed by: HOSPITALIST

## 2021-01-11 PROCEDURE — 770006 HCHG ROOM/CARE - MED/SURG/GYN SEMI*

## 2021-01-11 PROCEDURE — A9270 NON-COVERED ITEM OR SERVICE: HCPCS | Performed by: STUDENT IN AN ORGANIZED HEALTH CARE EDUCATION/TRAINING PROGRAM

## 2021-01-11 PROCEDURE — 700102 HCHG RX REV CODE 250 W/ 637 OVERRIDE(OP): Performed by: HOSPITALIST

## 2021-01-11 PROCEDURE — 700111 HCHG RX REV CODE 636 W/ 250 OVERRIDE (IP): Performed by: STUDENT IN AN ORGANIZED HEALTH CARE EDUCATION/TRAINING PROGRAM

## 2021-01-11 PROCEDURE — 85027 COMPLETE CBC AUTOMATED: CPT

## 2021-01-11 PROCEDURE — 700111 HCHG RX REV CODE 636 W/ 250 OVERRIDE (IP): Performed by: HOSPITALIST

## 2021-01-11 PROCEDURE — 85007 BL SMEAR W/DIFF WBC COUNT: CPT

## 2021-01-11 PROCEDURE — 36415 COLL VENOUS BLD VENIPUNCTURE: CPT

## 2021-01-11 PROCEDURE — 80048 BASIC METABOLIC PNL TOTAL CA: CPT

## 2021-01-11 RX ADMIN — OXYCODONE HYDROCHLORIDE 10 MG: 10 TABLET ORAL at 18:48

## 2021-01-11 RX ADMIN — MORPHINE SULFATE 1 MG: 4 INJECTION INTRAVENOUS at 08:05

## 2021-01-11 RX ADMIN — OXYCODONE HYDROCHLORIDE 10 MG: 10 TABLET ORAL at 05:12

## 2021-01-11 RX ADMIN — DOCUSATE SODIUM 50 MG AND SENNOSIDES 8.6 MG 2 TABLET: 8.6; 5 TABLET, FILM COATED ORAL at 17:56

## 2021-01-11 RX ADMIN — OXYCODONE HYDROCHLORIDE 10 MG: 10 TABLET ORAL at 14:34

## 2021-01-11 RX ADMIN — HEPARIN SODIUM 5000 UNITS: 5000 INJECTION, SOLUTION INTRAVENOUS; SUBCUTANEOUS at 17:57

## 2021-01-11 RX ADMIN — ALLOPURINOL 150 MG: 300 TABLET ORAL at 05:12

## 2021-01-11 RX ADMIN — OXYCODONE HYDROCHLORIDE 10 MG: 10 TABLET ORAL at 00:31

## 2021-01-11 RX ADMIN — HEPARIN SODIUM 5000 UNITS: 5000 INJECTION, SOLUTION INTRAVENOUS; SUBCUTANEOUS at 05:19

## 2021-01-11 RX ADMIN — DOCUSATE SODIUM 50 MG AND SENNOSIDES 8.6 MG 2 TABLET: 8.6; 5 TABLET, FILM COATED ORAL at 05:12

## 2021-01-11 RX ADMIN — CARVEDILOL 3.12 MG: 3.12 TABLET, FILM COATED ORAL at 17:56

## 2021-01-11 RX ADMIN — LOSARTAN POTASSIUM 25 MG: 25 TABLET, FILM COATED ORAL at 05:12

## 2021-01-11 RX ADMIN — TAMSULOSIN HYDROCHLORIDE 0.4 MG: 0.4 CAPSULE ORAL at 05:12

## 2021-01-11 RX ADMIN — OXYCODONE HYDROCHLORIDE 10 MG: 10 TABLET ORAL at 10:06

## 2021-01-11 ASSESSMENT — ENCOUNTER SYMPTOMS
NERVOUS/ANXIOUS: 0
SPEECH CHANGE: 0
BLOOD IN STOOL: 0
DIZZINESS: 0
PALPITATIONS: 0
EYE PAIN: 0
VOMITING: 0
SORE THROAT: 0
HEMOPTYSIS: 0
LOSS OF CONSCIOUSNESS: 0
DIARRHEA: 0
SPUTUM PRODUCTION: 0
COUGH: 0
BRUISES/BLEEDS EASILY: 0
ABDOMINAL PAIN: 0
HEADACHES: 0
FLANK PAIN: 0
EYE REDNESS: 0
FOCAL WEAKNESS: 0
SEIZURES: 0
FEVER: 0
CHILLS: 0
STRIDOR: 0
EYE DISCHARGE: 0
HALLUCINATIONS: 0
MYALGIAS: 1

## 2021-01-11 ASSESSMENT — PAIN DESCRIPTION - PAIN TYPE
TYPE: ACUTE PAIN

## 2021-01-11 NOTE — PROGRESS NOTES
This patient is medically clear, pending discharge with hospice.  He will remain in the hospital pending placement.  He will only be evaluated 2 times weekly unless there is a clinical status change.

## 2021-01-11 NOTE — PROGRESS NOTES
Pt is AOx4 with intermittent confusion. Reporting pain. PRN med given. Q2 turns. Monitoring for incontinence.

## 2021-01-11 NOTE — PROGRESS NOTES
Bedside report received.  Assessment complete.  A&O x 4 with some confusion. Patient calls appropriately.  Patient ambulates with 1 person assist. Bed alarm on and activated.   Patient has 5/10 pain. Pain managed with prescribed medications.  Denies N&V. Tolerating diabetic/renal diet.  + void, + flatus, + BM.  Patient on 5L supplemental oxygen.  SCD's refused.  Patient calm and comfortable.  Review plan with of care with patient. Call light and personal belongings with in reach. Hourly rounding in place. All needs met at this time.

## 2021-01-11 NOTE — THERAPY
Physical Therapy   Daily Treatment     Patient Name: Stephen Ortiz  Age:  85 y.o., Sex:  male  Medical Record #: [id number]  Today's Date: 1/10/2021     Precautions: (P) Fall Risk    Assessment  Patient making slow/ steady progress towards his functional mobility goals. Patient pleasant and eager to participate in therapy. Patient required increased time for transfers and ambulation and multimodal cues for safety and FWW management. Patient fatigues fast requiring seated rest break after 15' of ambulation. Patient continues to demonstrate below baseline function and will benefit from skilled IP PT in house to progress strength, improve balance and increase activity tolerance.    Plan    Continue current treatment plan.    DC Equipment Recommendations: (P) Unable to determine at this time  Discharge Recommendations: (P) Recommend post-acute placement for additional physical therapy services prior to discharge home     Objective       01/10/21 4655   Gait Analysis   Gait Level Of Assist Minimal Assist   Assistive Device Front Wheel Walker   Distance (Feet) 15   # of Times Distance was Traveled 2   Deviation Antalgic;Bradykinetic;Shuffled Gait;Other (Comment)   Weight Bearing Status no restrictions    Skilled Intervention Verbal Cuing;Tactile Cuing;Sequencing   Comments cues for safety with FWW   Functional Mobility   Sit to Stand Minimal Assist   Transfer Method Stand Step   Mobility w/ FWW   Skilled Intervention Verbal Cuing;Tactile Cuing;Sequencing   Comments cues for hand placement    Short Term Goals    Short Term Goal # 1 pt will perform supine <> sit without bed features with SPV in 6 visits to be able to get in/out of bed at home   Goal Outcome # 1 goal not met   Short Term Goal # 2 pt will perform all functional xfrs with SPV in 6 visits for improved independence   Goal Outcome # 2 Goal not met   Short Term Goal # 3 pt will ambulate 150ft with FWW and SPV in 6 visits to access home environment   Goal  Outcome # 3 Goal not met   Anticipated Discharge Equipment and Recommendations   DC Equipment Recommendations Unable to determine at this time   Discharge Recommendations Recommend post-acute placement for additional physical therapy services prior to discharge home

## 2021-01-11 NOTE — PROGRESS NOTES
Hospital Medicine Daily Progress Note    Date of Service  1/11/2021    Chief Complaint  85 y.o. male admitted 12/27/2020 with shortness of breath    Hospital Course  85 years old male with a past medical history of heart failure with reduced ejection fraction, atrial fibrillation, CML, prostate cancer s/p radiation admitted 12/23 with shortness of breath.  He was found to have acute hypoxemic respiratory failure saturating in the mid 80s at presentation.  Patient was diuresed with improvement of respiratory status.  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 Case discussed with oncology Dr. Kumar on 1/6, who do not think he has acute leukemia at this point, but overall has poor prognosis, recommended considering palliative[facility]hospice or [facility].  Palliative care consultation placed. Patient is expressing interest in hospice, referral placed.    Interval Problem Update  Oxygen requirement improving today, now on 4 L, down from 5.  Accepted by hospice, still no place to go.  Discussed with care coordination, looking [facility], versus group home.  Hemoglobin 7.7, platelets 122.  Creatinine stable 1.8.  Discussed with patient, patient's nurse and with multidisciplinary team during rounds including , pharmacist and charge nurse.    I discussed with long term team, plan to take over care until placement is available.    Consultants/Specialty  D/w oncology Dr Kumar   Palliative care.       Code Status  DNAR/DNI     Disposition  Accepted by [facility], needs placement, [facility]      Review of Systems  Review of Systems   Constitutional: Positive for malaise/fatigue. Negative for chills and fever.   HENT: Negative for congestion and sore throat.    Eyes: Negative for pain, discharge and redness.   Respiratory: Negative for cough, hemoptysis, sputum production and stridor. Shortness of breath:  Improved.    Cardiovascular: Positive for leg swelling. Negative for chest pain and palpitations.   Gastrointestinal: Negative for abdominal pain, blood in stool, diarrhea and vomiting.   Genitourinary: Negative for flank pain, hematuria and urgency.   Musculoskeletal: Positive for myalgias.   Skin: Negative.    Neurological: Negative for dizziness, speech change, focal weakness, seizures, loss of consciousness and headaches.   Endo/Heme/Allergies: Does not bruise/bleed easily.   Psychiatric/Behavioral: Negative for hallucinations and suicidal ideas. The patient is not nervous/anxious.       Physical Exam  Temp:  [36.4 °C (97.6 °F)-36.7 °C (98 °F)] 36.4 °C (97.6 °F)  Pulse:  [71-78] 78  Resp:  [16-19] 16  BP: ()/(54-63) 107/62  SpO2:  [91 %-100 %] 92 %    Physical Exam  Constitutional:       General: He is not in acute distress.     Appearance: He is not ill-appearing.   HENT:      Head: Normocephalic and atraumatic.      Right Ear: External ear normal.      Left Ear: External ear normal.      Mouth/Throat:      Mouth: Mucous membranes are moist.      Pharynx: No oropharyngeal exudate or posterior oropharyngeal erythema.   Eyes:      General: Scleral icterus present.      Extraocular Movements: Extraocular movements intact.      Pupils: Pupils are equal, round, and reactive to light.   Neck:      Musculoskeletal: Normal range of motion and neck supple.   Cardiovascular:      Rate and Rhythm: Regular rhythm. Tachycardia present.      Pulses: Normal pulses.      Heart sounds: Murmur present.   Pulmonary:      Effort: No respiratory distress.      Breath sounds: Rhonchi (Diffuse) present. No wheezing or rales.      Comments: Reduced air entry bilaterally basally  Abdominal:      General: Bowel sounds are normal. There is no distension.      Palpations: Abdomen is soft.      Tenderness: There is no abdominal tenderness. There is no guarding.   Musculoskeletal:         General: No swelling or tenderness.      Right  lower leg: Edema present.      Left lower leg: Edema present.   Skin:     General: Skin is warm and dry.      Capillary Refill: Capillary refill takes 2 to 3 seconds.      Coloration: Skin is jaundiced and pale.      Findings: Bruising present.   Neurological:      General: No focal deficit present.      Mental Status: He is oriented to person, place, and time.      Sensory: No sensory deficit.      Motor: No weakness.   Psychiatric:         Mood and Affect: Mood normal.         Behavior: Behavior normal.       Fluids    Intake/Output Summary (Last 24 hours) at 1/11/2021 1546  Last data filed at 1/11/2021 1200  Gross per 24 hour   Intake 1040 ml   Output --   Net 1040 ml     Laboratory  Recent Labs     01/09/21  0142 01/10/21  0121 01/11/21  0107   WBC 10.9* 9.5 11.0*   RBC 2.53* 2.57* 2.44*   HEMOGLOBIN 7.9* 7.9* 7.7*   HEMATOCRIT 25.3* 25.8* 24.9*   .0* 100.4* 102.0*   MCH 31.2 30.7 31.6   MCHC 31.2* 30.6* 30.9*   RDW 82.8* 84.0* 85.9*   PLATELETCT 125* 123* 122*   MPV 11.2 10.3 10.3     Recent Labs     01/09/21  0142 01/10/21  0121 01/11/21  0107   SODIUM 132* 136 135   POTASSIUM 4.3 3.8 3.6   CHLORIDE 100 104 101   CO2 19* 23 22   GLUCOSE 125* 109* 102*   BUN 45* 40* 35*   CREATININE 2.03* 1.80* 1.81*   CALCIUM 8.5 8.6 8.6                   Imaging  DX-CHEST-2 VIEWS   Final Result      1.  Low lung volumes   2.  Persistently enlarged cardiac silhouette   3.  Pulmonary edema and central vascular congestion   4.  Probable LEFT pleural effusion      DX-CHEST-PORTABLE (1 VIEW)   Final Result      No significant change from prior exam.      DX-TOE(S) 2+ RIGHT   Final Result      1.  Hallux valgus with bunion formation and severe degenerative change of RIGHT 1st metatarsophalangeal joint.   2.  No fracture or gross focal bony destruction.      US-RUQ   Final Result      Cholelithiasis with borderline gallbladder wall thickening which may related to right heart failure (versus cholecystitis) as there is evidence  of pleural fluid and ascites with bidirectional flow in the main portal vein.      Echogenic right kidney with renal cortical thinning consistent with medical renal disease.      EC-ECHOCARDIOGRAM COMPLETE W/O CONT   Final Result      NM-LUNG VENT/PERF IMAGING   Final Result      Low probability VQ scan.      US-EXTREMITY VENOUS LOWER BILAT   Final Result      DX-CHEST-PORTABLE (1 VIEW)   Final Result      1.  Cardiomegaly.      2.  Bibasilar atelectasis.         Assessment/Plan  * Acute respiratory failure with hypoxia (HCC)- (present on admission)  Assessment & Plan  Secondary to acute on chronic heart failure  Echocardiogram 12/27 -LVEF 25%, dilated right ventricle, severe tricuspid regurgitation  CXR pulmonary edema with possible L pleural effusion  Improving with diuretics  bumetanide, spironolactone with hold parameters.      1/7, worse, lasix added     1/8-11, stable  On ~ 4-5 L      CMML (chronic myelomonocytic leukemia) (HCC)- (present on admission)  Assessment & Plan  Persistent monocytosis for greater than 3 months  Bone marrow biopsy consistent with CMML-2 with 10 to 19% blasts.  Pending cytogenetics to risk stratify which will determine if chemotherapy will be beneficial    1/6, I discussed with oncology Dr. Kumar who does not think he has acute leukemia at this point, but overall has poor prognosis, recommended considering palliative/hospice or [facility].  1/7, patient is interested in hospice, referral has been placed  1/8, accepted by [facility], needs placement      Acute on chronic congestive heart failure (HCC)- (present on admission)  Assessment & Plan  Echocardiogram 12/27 -LVEF 25%, dilated right ventricle, severe tricuspid regurgitation  Resume carvedilol, losartan, spironolactone with hold parameters.       Monocytosis- (present on admission)  Assessment & Plan  Marked monocytosis with erythroblasts/pRBCs, bone marrow biopsy recommended to exclude leukemia,  myeloproliferative disorders  43.70% monocytes on 12/29 AM CBC  Bone marrow biopsy consistent with CMML, [facility]     Ulcer of great toe, right, with unspecified severity (HCC)  Assessment & Plan  Ulcer appears clean and dry, no bone penetration. Degenerative changes on x-ray but not correlated to area of ulcer, no osteomyelitis.  -No further wound care needs    Acute on chronic kidney injury.- (present on admission)  Assessment & Plan  Improving  Avoid nephrotoxins as much as possible, renally dose medications, monitor inputs and outputs     Chronic atrial fibrillation (HCC)- (present on admission)  Assessment & Plan  AICD for heart failure in place  Not on anticoagulation 2/2 easy bleeding and CML.   Resume carvedilol for rate control with hold parameters.     Tricuspid regurgitation- (present on admission)  Assessment & Plan  Echo 12/27 showed severe tricuspid regurgitation  Monitor and optimize volume status while inpatient  [facility]    Mitral regurgitation- (present on admission)  Assessment & Plan  History of, s/p mitral valve clip in 10/2020  Echocardiogram 12/27 showed mild mitral stenosis   Monitor and optimize volume status, [facility]      Bacteremia due to Escherichia coli  Assessment & Plan  S/p IV ceftriaxone & doxycycline       BPH (benign prostatic hyperplasia)- (present on admission)  Assessment & Plan  Continue home tamsulosin     Elevated liver enzymes- (present on admission)  Assessment & Plan  AST/ALT normal in 11/2020 at [facility], t bili was 2.4; AST//218, T bili 3.6 admission  History of heavy drinking per patient, none now per his report -though patient reports 12 to 24 pack use  Hepatitis panel negative  US liver ordered - cholelithiasis with borderline gallbladder wall thickening  Likely secondary to hepatic congestion from heart failure, and secondary to his myeloid leukemia  Counseled to stop alcohol, will  need regular lab follow-up       VTE prophylaxis: Heparin SC

## 2021-01-11 NOTE — CARE PLAN
Problem: Communication  Goal: The ability to communicate needs accurately and effectively will improve  Outcome: PROGRESSING AS EXPECTED  Note: Pt able to make needs known. Uses call light appropriately     Problem: Respiratory:  Goal: Respiratory status will improve  Outcome: PROGRESSING AS EXPECTED  Note: Pt does not desat as quickly as on previous nights when he removes his oxygen. Possibility for titration down today

## 2021-01-11 NOTE — PROGRESS NOTES
Pt alert, oriented but forgetful at times. VSS on 4L O2 NC.  POC discussed with patient; all questions and concerns addressed.  Managing pain with PRN oxycodone.  Tolerating diet, denies nausea,added Boost Plus today. Incontinent of urine at times.   Pt free from falls. Turning self in bed. Ambulating in room assist x1 w/ FWW.  Bed alarm on, call light within reach, bed in lowest locked position, hourly rounding in place.

## 2021-01-12 DIAGNOSIS — Z23 NEED FOR VACCINATION: ICD-10-CM

## 2021-01-12 PROBLEM — R78.81 BACTEREMIA DUE TO ESCHERICHIA COLI: Status: RESOLVED | Noted: 2020-12-29 | Resolved: 2021-01-12

## 2021-01-12 PROBLEM — B96.20 BACTEREMIA DUE TO ESCHERICHIA COLI: Status: RESOLVED | Noted: 2020-12-29 | Resolved: 2021-01-12

## 2021-01-12 PROBLEM — J96.01 ACUTE RESPIRATORY FAILURE WITH HYPOXIA (HCC): Status: RESOLVED | Noted: 2020-12-27 | Resolved: 2021-01-12

## 2021-01-12 LAB
ANION GAP SERPL CALC-SCNC: 13 MMOL/L (ref 7–16)
ANISOCYTOSIS BLD QL SMEAR: ABNORMAL
BASOPHILS # BLD AUTO: 1.7 % (ref 0–1.8)
BASOPHILS # BLD: 0.18 K/UL (ref 0–0.12)
BUN SERPL-MCNC: 30 MG/DL (ref 8–22)
BURR CELLS BLD QL SMEAR: NORMAL
CALCIUM SERPL-MCNC: 8.7 MG/DL (ref 8.5–10.5)
CHLORIDE SERPL-SCNC: 100 MMOL/L (ref 96–112)
CO2 SERPL-SCNC: 21 MMOL/L (ref 20–33)
CREAT SERPL-MCNC: 1.53 MG/DL (ref 0.5–1.4)
DACRYOCYTES BLD QL SMEAR: NORMAL
EOSINOPHIL # BLD AUTO: 0 K/UL (ref 0–0.51)
EOSINOPHIL NFR BLD: 0 % (ref 0–6.9)
ERYTHROCYTE [DISTWIDTH] IN BLOOD BY AUTOMATED COUNT: 88.4 FL (ref 35.9–50)
GLUCOSE SERPL-MCNC: 109 MG/DL (ref 65–99)
HCT VFR BLD AUTO: 27.6 % (ref 42–52)
HGB BLD-MCNC: 8.3 G/DL (ref 14–18)
HYPOCHROMIA BLD QL SMEAR: ABNORMAL
LYMPHOCYTES # BLD AUTO: 1.14 K/UL (ref 1–4.8)
LYMPHOCYTES NFR BLD: 11.1 % (ref 22–41)
MACROCYTES BLD QL SMEAR: ABNORMAL
MANUAL DIFF BLD: NORMAL
MCH RBC QN AUTO: 31.7 PG (ref 27–33)
MCHC RBC AUTO-ENTMCNC: 30.1 G/DL (ref 33.7–35.3)
MCV RBC AUTO: 105.3 FL (ref 81.4–97.8)
MICROCYTES BLD QL SMEAR: ABNORMAL
MONOCYTES # BLD AUTO: 3.08 K/UL (ref 0–0.85)
MONOCYTES NFR BLD AUTO: 29.9 % (ref 0–13.4)
MORPHOLOGY BLD-IMP: NORMAL
NEUTROPHILS # BLD AUTO: 5.9 K/UL (ref 1.82–7.42)
NEUTROPHILS NFR BLD: 55.6 % (ref 44–72)
NEUTS BAND NFR BLD MANUAL: 1.7 % (ref 0–10)
NRBC # BLD AUTO: 0.05 K/UL
NRBC BLD-RTO: 0.5 /100 WBC
OVALOCYTES BLD QL SMEAR: NORMAL
PLATELET # BLD AUTO: 114 K/UL (ref 164–446)
PLATELET BLD QL SMEAR: NORMAL
PMV BLD AUTO: 10.1 FL (ref 9–12.9)
POLYCHROMASIA BLD QL SMEAR: NORMAL
POTASSIUM SERPL-SCNC: 3.6 MMOL/L (ref 3.6–5.5)
RBC # BLD AUTO: 2.62 M/UL (ref 4.7–6.1)
RBC BLD AUTO: PRESENT
SODIUM SERPL-SCNC: 134 MMOL/L (ref 135–145)
TARGETS BLD QL SMEAR: NORMAL
WBC # BLD AUTO: 10.3 K/UL (ref 4.8–10.8)

## 2021-01-12 PROCEDURE — A9270 NON-COVERED ITEM OR SERVICE: HCPCS | Performed by: STUDENT IN AN ORGANIZED HEALTH CARE EDUCATION/TRAINING PROGRAM

## 2021-01-12 PROCEDURE — 36415 COLL VENOUS BLD VENIPUNCTURE: CPT

## 2021-01-12 PROCEDURE — 770006 HCHG ROOM/CARE - MED/SURG/GYN SEMI*

## 2021-01-12 PROCEDURE — 700102 HCHG RX REV CODE 250 W/ 637 OVERRIDE(OP): Performed by: STUDENT IN AN ORGANIZED HEALTH CARE EDUCATION/TRAINING PROGRAM

## 2021-01-12 PROCEDURE — 700111 HCHG RX REV CODE 636 W/ 250 OVERRIDE (IP): Performed by: HOSPITALIST

## 2021-01-12 PROCEDURE — 80048 BASIC METABOLIC PNL TOTAL CA: CPT

## 2021-01-12 PROCEDURE — A9270 NON-COVERED ITEM OR SERVICE: HCPCS | Performed by: HOSPITALIST

## 2021-01-12 PROCEDURE — 700102 HCHG RX REV CODE 250 W/ 637 OVERRIDE(OP): Performed by: HOSPITALIST

## 2021-01-12 PROCEDURE — 85007 BL SMEAR W/DIFF WBC COUNT: CPT

## 2021-01-12 PROCEDURE — 85027 COMPLETE CBC AUTOMATED: CPT

## 2021-01-12 RX ORDER — LOSARTAN POTASSIUM 25 MG/1
25 TABLET ORAL DAILY
Qty: 30 TAB | Status: SHIPPED
Start: 2021-01-13

## 2021-01-12 RX ORDER — ISOSORBIDE MONONITRATE 30 MG/1
30 TABLET, EXTENDED RELEASE ORAL EVERY MORNING
Qty: 30 TAB | Status: SHIPPED
Start: 2021-01-13

## 2021-01-12 RX ORDER — BUMETANIDE 1 MG/1
1 TABLET ORAL DAILY
Qty: 30 TAB | Status: SHIPPED
Start: 2021-01-13

## 2021-01-12 RX ORDER — OXYCODONE HYDROCHLORIDE 10 MG/1
10 TABLET ORAL EVERY 4 HOURS PRN
Qty: 18 TAB | Refills: 0 | Status: SHIPPED | OUTPATIENT
Start: 2021-01-12 | End: 2021-01-15

## 2021-01-12 RX ORDER — SPIRONOLACTONE 25 MG/1
25 TABLET ORAL DAILY
Qty: 30 TAB | Refills: 3 | Status: SHIPPED
Start: 2021-01-13

## 2021-01-12 RX ORDER — LIDOCAINE 50 MG/G
1 PATCH TOPICAL EVERY 24 HOURS
Qty: 10 PATCH | Status: SHIPPED
Start: 2021-01-12

## 2021-01-12 RX ADMIN — OXYCODONE HYDROCHLORIDE 10 MG: 10 TABLET ORAL at 08:36

## 2021-01-12 RX ADMIN — HEPARIN SODIUM 5000 UNITS: 5000 INJECTION, SOLUTION INTRAVENOUS; SUBCUTANEOUS at 05:09

## 2021-01-12 RX ADMIN — OXYCODONE HYDROCHLORIDE 10 MG: 10 TABLET ORAL at 01:28

## 2021-01-12 RX ADMIN — DOCUSATE SODIUM 50 MG AND SENNOSIDES 8.6 MG 2 TABLET: 8.6; 5 TABLET, FILM COATED ORAL at 17:36

## 2021-01-12 RX ADMIN — OXYCODONE HYDROCHLORIDE 10 MG: 10 TABLET ORAL at 14:27

## 2021-01-12 RX ADMIN — TAMSULOSIN HYDROCHLORIDE 0.4 MG: 0.4 CAPSULE ORAL at 05:08

## 2021-01-12 RX ADMIN — DOCUSATE SODIUM 50 MG AND SENNOSIDES 8.6 MG 2 TABLET: 8.6; 5 TABLET, FILM COATED ORAL at 05:09

## 2021-01-12 RX ADMIN — HEPARIN SODIUM 5000 UNITS: 5000 INJECTION, SOLUTION INTRAVENOUS; SUBCUTANEOUS at 17:38

## 2021-01-12 RX ADMIN — ALLOPURINOL 150 MG: 300 TABLET ORAL at 05:08

## 2021-01-12 RX ADMIN — CARVEDILOL 3.12 MG: 3.12 TABLET, FILM COATED ORAL at 17:37

## 2021-01-12 RX ADMIN — OXYCODONE HYDROCHLORIDE 10 MG: 10 TABLET ORAL at 19:34

## 2021-01-12 ASSESSMENT — PAIN DESCRIPTION - PAIN TYPE
TYPE: ACUTE PAIN

## 2021-01-12 NOTE — PROGRESS NOTES
Report received from steven RN. Pt is AOx4 with some  Forgetfulness and new impulsivity as of today. Bed alarm in place. Pt wearing home pull-ups per pt request. Found to be dry right now. States previously given prn pain med was effective. Denies needing other pain interventions at this time. Satting well on 4L, decreased oxygen to 3.5L. Call light in reach. Will continue to monitor.

## 2021-01-12 NOTE — PROGRESS NOTES
Pt complained of nausea, refused zofran and he insists on taking a pain medication. He is verbally upset about not getting his pain medication and won't sign his discharge paperwork.

## 2021-01-12 NOTE — DISCHARGE SUMMARY
Discharge Summary    CHIEF COMPLAINT ON ADMISSION  Chief Complaint   Patient presents with   • Shortness of Breath     RA sat 86% upon arrival, no home O2   • Weakness     X3 days       Reason for Admission  EMS     CODE STATUS  DNAR/DNI    HPI & HOSPITAL COURSE  85 years old male with a past medical history of heart failure with reduced ejection fraction, atrial fibrillation, CML, prostate cancer s/p radiation admitted 12/23 with shortness of breath.  He was found to have acute hypoxemic respiratory failure saturating in the mid 80s at presentation.  Patient was diuresed with improvement of respiratory status.  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 Case discussed with oncology Dr. Kumar on 1/6, who do not think he has acute leukemia at this point, but overall has poor prognosis, recommended considering palliative[facility]hospice [facility] Palliative care was consulted. Patient expressed interest in hospice. Referral was placed and he was discharged to [facility] Hospice once bed was available.       Therefore, he is discharged in guarded and stable condition to skilled nursing facility.    The patient met 2-midnight criteria for an inpatient stay at the time of discharge.      FOLLOW UP ITEMS POST DISCHARGE  N/A    DISCHARGE DIAGNOSES  Principal Problem (Resolved):    Acute respiratory failure with hypoxia (HCC) POA: Yes  Active Problems:    Acute on chronic congestive heart failure (HCC) POA: Yes    CMML (chronic myelomonocytic leukemia) (HCC) POA: Yes    Mitral regurgitation POA: Yes    Tricuspid regurgitation POA: Yes    Chronic atrial fibrillation (HCC) POA: Yes    Acute on chronic kidney injury. POA: Yes    Monocytosis POA: Yes    Elevated liver enzymes POA: Yes    BPH (benign prostatic hyperplasia) POA: Yes  Resolved Problems:    Bacteremia due to Escherichia coli POA: Unknown      FOLLOW UP  No future appointments.  No follow-up  provider specified.    MEDICATIONS ON DISCHARGE     Medication List      START taking these medications      Instructions   bumetanide 1 MG Tabs  Start taking on: January 13, 2021  Commonly known as: BUMEX   Take 1 Tab by mouth every day.  Dose: 1 mg     lidocaine 5 % Ptch  Commonly known as: LIDODERM   Place 1 Patch on the skin every 24 hours.  Dose: 1 Patch     spironolactone 25 MG Tabs  Start taking on: January 13, 2021  Commonly known as: ALDACTONE   Take 1 Tab by mouth every day.  Dose: 25 mg        CHANGE how you take these medications      Instructions   isosorbide mononitrate SR 30 MG Tb24  Start taking on: January 13, 2021  What changed: when to take this  Commonly known as: IMDUR   Take 1 Tab by mouth every morning.  Dose: 30 mg     losartan 25 MG Tabs  Start taking on: January 13, 2021  What changed: when to take this  Commonly known as: COZAAR   Take 1 Tab by mouth every day.  Dose: 25 mg     oxyCODONE immediate release 10 MG immediate release tablet  What changed:   · medication strength  · how much to take  · when to take this  · reasons to take this  Commonly known as: ROXICODONE   Take 1 Tab by mouth every four hours as needed for Severe Pain for up to 3 days.  Dose: 10 mg        CONTINUE taking these medications      Instructions   allopurinol 300 MG Tabs  Commonly known as: ZYLOPRIM   Take 150 mg by mouth every morning. 0.5 tablet = 150 mg  Dose: 150 mg     carvedilol 3.125 MG Tabs  Commonly known as: COREG   Take 3.125 mg by mouth 2 (two) times a day.  Dose: 3.125 mg     metoprolol 25 MG Tabs  Commonly known as: LOPRESSOR   Take 25 mg by mouth 2 times a day. Hold if SBP less than 100 or HR less than 60  Dose: 25 mg     rosuvastatin 10 MG Tabs  Commonly known as: CRESTOR   Take 10 mg by mouth every bedtime.  Dose: 10 mg     tamsulosin 0.4 MG capsule  Commonly known as: FLOMAX   Take 0.4 mg by mouth every morning.  Dose: 0.4 mg        STOP taking these medications    bisacodyl 10 MG Supp  Commonly  known as: DULCOLAX     docusate sodium 250 MG capsule  Commonly known as: COLACE     furosemide 20 MG Tabs  Commonly known as: LASIX     hydrALAZINE 25 MG Tabs  Commonly known as: APRESOLINE     potassium chloride SA 20 MEQ Tbcr  Commonly known as: Kdur            Allergies  No Known Allergies    DIET  Orders Placed This Encounter   Procedures   • Diet Order Diet: Cardiac; Second Modifier: (optional): Renal     Standing Status:   Standing     Number of Occurrences:   1     Order Specific Question:   Diet:     Answer:   Cardiac [6]     Order Specific Question:   Second Modifier: (optional)     Answer:   Renal [8]       ACTIVITY  As tolerated and directed by skilled nursing.  Weight bearing as tolerated    LINES, DRAINS, AND WOUNDS  This is an automated list. Peripheral IVs will be removed prior to discharge.  Peripheral IV 01/05/21 22 G Posterior;Right Hand (Active)   Site Assessment Clean;Dry;Intact 01/11/21 2030   Dressing Type Transparent 01/11/21 2030   Line Status Scrubbed the hub prior to access;Saline locked;Flushed 01/11/21 2030   Dressing Status Clean;Dry;Intact 01/11/21 2030   Dressing Intervention N/A 01/11/21 2030   Dressing Change Due 01/16/20 01/10/21 2100   Infiltration Grading ([facility]) 0 01/11/21 2030   Phlebitis Scale ([facility] Only) 0 01/11/21 2030     External Urinary Catheter (Female Wick) (Active)   Collection Container Suction container 01/09/21 2000      Wound 12/30/20 Incision Hip Left (Active)   Site Assessment Clean;Dry 01/11/21 2030   Periwound Assessment Clean;Dry;Intact 01/11/21 2030   Drainage Amount None 01/11/21 2030   Dressing Options Open to Air 01/11/21 2030   Dressing Changed Observed 01/04/21 1950       Wound 01/05/21 Other (comment) Arm Medial Left small openings that pt is wheeping out of (Active)   Wound Image   01/05/21 0434   Site Assessment Pale;Pink;Red;Edema 01/11/21 2030   Periwound Assessment Cool;Pink 01/11/21 2030   Margins Defined edges 01/11/21 2030   Closure  Open to air 01/11/21 2030   Drainage Amount Small 01/11/21 2030   Drainage Description Clear 01/11/21 2030   Dressing Options Open to Air 01/11/21 2030   NEXT Weekly Photo (Inpatient Only) 01/13/21 01/05/21 2012       Wound 01/09/21 Arm Anterior Right Skin tear (Active)   Site Assessment Intact;Clean 01/12/21 0821   Periwound Assessment Intact 01/12/21 0821   Margins Undefined edges 01/12/21 0821   Drainage Amount Small 01/12/21 0821   Drainage Description Clear 01/12/21 0821   Treatments Cleansed 01/09/21 0944   Wound Cleansing Soap and Water 01/10/21 1945   Dressing Options Mepilex 01/12/21 0821   Dressing Changed Observed 01/12/21 0821   Dressing Status New drainage;Intact 01/12/21 0821       Peripheral IV 01/05/21 22 G Posterior;Right Hand (Active)   Site Assessment Clean;Dry;Intact 01/11/21 2030   Dressing Type Transparent 01/11/21 2030   Line Status Scrubbed the hub prior to access;Saline locked;Flushed 01/11/21 2030   Dressing Status Clean;Dry;Intact 01/11/21 2030   Dressing Intervention N/A 01/11/21 2030   Dressing Change Due 01/16/20 01/10/21 2100   Infiltration Grading ([facility] 0 01/11/21 2030   Phlebitis Scale ([facility] Only) 0 01/11/21 2030               MENTAL STATUS ON TRANSFER  Level of Consciousness: Alert  Orientation : Oriented x 4  Speech: Speech Clear    CONSULTATIONS  Palliative Care     PROCEDURES  12/30/20: Bone marrow biopsy     LABORATORY  Lab Results   Component Value Date    SODIUM 134 (L) 01/12/2021    POTASSIUM 3.6 01/12/2021    CHLORIDE 100 01/12/2021    CO2 21 01/12/2021    GLUCOSE 109 (H) 01/12/2021    BUN 30 (H) 01/12/2021    CREATININE 1.53 (H) 01/12/2021    CREATININE 1.3 02/16/2006        Lab Results   Component Value Date    WBC 10.3 01/12/2021    HEMOGLOBIN 8.3 (L) 01/12/2021    HEMATOCRIT 27.6 (L) 01/12/2021    PLATELETCT 114 (L) 01/12/2021        Total time of the discharge process exceeds 35 minutes.

## 2021-01-12 NOTE — DISCHARGE INSTRUCTIONS
Discharge Instructions    [facility] with escort. Discharged via wheelchair, hospital escort: Yes.  Special equipment needed: Not Applicable    Be sure to schedule a follow-up appointment with your primary care doctor or any specialists as instructed.     Discharge Plan:   Diet Plan: Discussed  Activity Level: Discussed  Confirmed Follow up Appointment: Patient to Call and Schedule Appointment  Confirmed Symptoms Management: Discussed  Medication Reconciliation Updated: Yes  Influenza Vaccine Indication: Not indicated: Previously immunized this influenza season and > 8 years of age    I understand that a diet low in cholesterol, fat, and sodium is recommended for good health. Unless I have been given specific instructions below for another diet, I accept this instruction as my diet prescription.   Other diet: Cardiac + Renal    Special Instructions: None    · Is patient discharged on Warfarin / Coumadin?   No     Depression / Suicide Risk    As you are discharged from this [facility] facility, it is important to learn how to keep safe from harming yourself.    Recognize the warning signs:  · Abrupt changes in personality, positive or negative- including increase in energy   · Giving away possessions  · Change in eating patterns- significant weight changes-  positive or negative  · Change in sleeping patterns- unable to sleep or sleeping all the time   · Unwillingness or inability to communicate  · Depression  · Unusual sadness, discouragement and loneliness  · Talk of wanting to die  · Neglect of personal appearance   · Rebelliousness- reckless behavior  · Withdrawal from people/activities they love  · Confusion- inability to concentrate     If you or a loved one observes any of these behaviors or has concerns about self-harm, here's what you can do:  · Talk about it- your feelings and reasons for harming yourself  · Remove any means that you might use to hurt  yourself (examples: pills, rope, extension cords, firearm)  · Get professional help from the community (Mental Health, Substance Abuse, psychological counseling)  · Do not be alone:Call your Safe Contact- someone whom you trust who will be there for you.  · Call your local CRISIS HOTLINE 997-7497 or 837-743-4742  · Call your local Children's Mobile Crisis Response Team [place] (337) 675-2201 or www.Joincube.com  · Call the toll free National Suicide Prevention Hotlines   · National Suicide Prevention Lifeline 141-298-ENNO (7704)  · [employer] Hope Line Network 800-SUICIDE (947-7017)    Acute Respiratory Failure, Adult    Acute respiratory failure occurs when there is not enough oxygen passing from your lungs to your body. When this happens, your lungs have trouble removing carbon dioxide from the blood. This causes your blood oxygen level to drop too low as carbon dioxide builds up.  Acute respiratory failure is a medical emergency. It can develop quickly, but it is temporary if treated promptly. Your lung capacity, or how much air your lungs can hold, may improve with time, exercise, and treatment.  What are the causes?  There are many possible causes of acute respiratory failure, including:  · Lung injury.  · Chest injury or damage to the ribs or tissues near the lungs.  · Lung conditions that affect the flow of air and blood into and out of the lungs, such as pneumonia, acute respiratory distress syndrome, and cystic fibrosis.  · Medical conditions, such as strokes or spinal cord injuries, that affect the muscles and nerves that control breathing.  · Blood infection (sepsis).  · Inflammation of the pancreas (pancreatitis).  · A blood clot in the lungs (pulmonary embolism).  · A large-volume blood transfusion.  · Burns.  · Near-drowning.  · Seizure.  · Smoke inhalation.  · Reaction to medicines.  · Alcohol or drug overdose.  What increases the risk?  This condition is more likely to develop in people who  have:  · A blocked airway.  · Asthma.  · A condition or disease that damages or weakens the muscles, nerves, bones, or tissues that are involved in breathing.  · A serious infection.  · A health problem that blocks the unconscious reflex that is involved in breathing, such as hypothyroidism or sleep apnea.  · A lung injury or trauma.  What are the signs or symptoms?  Trouble breathing is the main symptom of acute respiratory failure. Symptoms may also include:  · Rapid breathing.  · Restlessness or anxiety.  · Skin, lips, or fingernails that appear blue (cyanosis).  · Rapid heart rate.  · Abnormal heart rhythms (arrhythmias).  · Confusion or changes in behavior.  · Tiredness or loss of energy.  · Feeling sleepy or having a loss of consciousness.  How is this diagnosed?  Your health care provider can diagnose acute respiratory failure with a medical history and physical exam. During the exam, your health care provider will listen to your heart and check for crackling or wheezing sounds in your lungs. Your may also have tests to confirm the diagnosis and determine what is causing respiratory failure. These tests may include:  · Measuring the amount of oxygen in your blood (pulse oximetry). The measurement comes from a small device that is placed on your finger, earlobe, or toe.  · Other blood tests to measure blood gases and to look for signs of infection.  · Sampling your cerebral spinal fluid or tracheal fluid to check for infections.  · Chest X-ray to look for fluid in spaces that should be filled with air.  · Electrocardiogram (ECG) to look at the heart's electrical activity.  How is this treated?  Treatment for this condition usually takes places in a hospital intensive care unit (ICU). Treatment depends on what is causing the condition. It may include one or more treatments until your symptoms improve. Treatment may include:  · Supplemental oxygen. Extra oxygen is given through a tube in the nose, a face mask,  or a stephen.  · A device such as a continuous positive airway pressure (CPAP) or bi-level positive airway pressure (BiPAP or BPAP) machine. This treatment uses mild air pressure to keep the airways open. A mask or other device will be placed over your nose or mouth. A tube that is connected to a motor will deliver oxygen through the mask.  · Ventilator. This treatment helps move air into and out of the lungs. This may be done with a bag and mask or a machine. For this treatment, a tube is placed in your windpipe (trachea) so air and oxygen can flow to the lungs.  · Extracorporeal membrane oxygenation (ECMO). This treatment temporarily takes over the function of the heart and lungs, supplying oxygen and removing carbon dioxide. ECMO gives the lungs a chance to recover. It may be used if a ventilator is not effective.  · Tracheostomy. This is a procedure that creates a hole in the neck to insert a breathing tube.  · Receiving fluids and medicines.  · Rocking the bed to help breathing.  Follow these instructions at home:  · Take over-the-counter and prescription medicines only as told by your health care provider.  · Return to normal activities as told by your health care provider. Ask your health care provider what activities are safe for you.  · Keep all follow-up visits as told by your health care provider. This is important.  How is this prevented?  Treating infections and medical conditions that may lead to acute respiratory failure can help prevent the condition from developing.  Contact a health care provider if:  · You have a fever.  · Your symptoms do not improve or they get worse.  Get help right away if:  · You are having trouble breathing.  · You lose consciousness.  · Your have cyanosis or turn blue.  · You develop a rapid heart rate.  · You are confused.  These symptoms may represent a serious problem that is an emergency. Do not wait to see if the symptoms will go away. Get medical help right away. Call  your local emergency services (911 in the [place] Do not drive yourself to the hospital.  This information is not intended to replace advice given to you by your health care provider. Make sure you discuss any questions you have with your health care provider.  Document Released: 12/23/2014 Document Revised: 11/30/2018 Document Reviewed: 07/05/2017  Elsevier Patient Education © 2020 [employer]

## 2021-01-12 NOTE — CARE PLAN
Problem: Respiratory:  Goal: Respiratory status will improve  Outcome: PROGRESSING AS EXPECTED   Pt is in the hospital for acute respiratory failure, he is currently on 3 and a half liters, head of the bed is raised greater than 30 degrees, and pt is encouraged to cough.    Problem: Mobility  Goal: Risk for activity intolerance will decrease  Outcome: PROGRESSING SLOWER THAN EXPECTED   Pt has been in bed throughout the entirety of the shift, pt encouraged to mobilize much as possible but due to his condition he is unable to.

## 2021-01-12 NOTE — PROGRESS NOTES
Assumed care of pt at 0700 from the night shift nurse. Pt complains of 8/10 pain on his bilateral lower extremities which he claims is from his arthritis and other preexisting condition. Pt denies any other needs at this time.

## 2021-01-12 NOTE — DISCHARGE PLANNING
Per LSW Jacqueline   Agency/Facility Name: [facility]   Referral sent per Choice form @ [phone] -[phone]  Agency/Facility Name: [facility]   Spoke To: Christie   Outcome: PT declined due to facility not having any hospice beds     [facility]  Spoke To: Frnacisco  Outcome: Referral under review     Agency/Facility Name: [facility]   Outcome: per Epic response, PT declined due to lack of hospice beds     -1053  Agency/Facility Name: [facility]  Spoke To: Francisco   Outcome: PT accepted.  Francisco requested diety information from [facility] as well as estimated d/c date.  [facility] will call back.    -[phone]  Agency/Facility Name: [facility]  Spoke To: Delvin  Outcome: PT declined due to non-contracted insurance     Agency/Facility Name: [facility]  Spoke To: Syed   Outcome: PT can go today at 1330     JUSTO Ponce notified     -1307  Received Transport Form @ 0565  Spoke to Pranav @ [facility]    Transport is scheduled for 01/12 @ 1400 going to [facility].    -1450  Agency/Facility Name: [facility]  Spoke To: Pranav  Outcome: Per W Jacqueline, transport was moved to 01/13 at 0900    JUSTO Ponce notified

## 2021-01-12 NOTE — DISCHARGE PLANNING
Anticipated Discharge Disposition: SNF with Hospice    Action: LSW took call from Pt's ex-wife barbara and updated her on SNF acceptance at [facility]. Barbara explained the family would prefer Pt stay in [place] if possible. LSW explained that there is still one SNF in [place] Pending, but the likelihood that they will also have a bed available is small. Barbara agreed that Pt will have to go where there is a bed available but would like LSW to follow up with [facility] before a final decision is made.     LSW updated that Pt was declined by [facility] as well. LSW updated Pt and he agreed that [facility] is his best option. He believes that his sons are both at work so he asked LSW to update his ex-wife Barbara about his discharge and she will call his sons later today. LSW called Barbara with update and gave her contact info for [facility]. Barbara agreed to call both Stephen's sons this evening when they are off of work to update them.     LSW called Naina at [facility] to update her on acceptance to SNF. Per Nirali there is nothing else that they need except his d/c date. LSW told her we will discharge him this afternoon.     LSW called Abi with [facility] and let her know Pt is discharging today to [facility], left message.     COBRA form and discharge packet completed and signed by Pt, APRN, and LSW.     Barriers to Discharge: [facility] still pending, family would like an answer from them before agreeing to [facility]    Plan: LSW to follow up on referral to [facility]

## 2021-01-12 NOTE — DISCHARGE PLANNING
Anticipated Discharge Disposition: SNF with Hospice    Action: LSW received voicemail from Nirali [facility] to confirm Pt is eligible for the program and that they are contracted with [facility] and [facility].         Barriers to Discharge: SNF acceptance    Plan: LSW to discuss SNF with Pt

## 2021-01-13 VITALS
DIASTOLIC BLOOD PRESSURE: 73 MMHG | HEART RATE: 71 BPM | SYSTOLIC BLOOD PRESSURE: 114 MMHG | TEMPERATURE: 97.9 F | BODY MASS INDEX: 28.51 KG/M2 | RESPIRATION RATE: 19 BRPM | WEIGHT: 181.66 LBS | HEIGHT: 67 IN | OXYGEN SATURATION: 93 %

## 2021-01-13 PROCEDURE — 700111 HCHG RX REV CODE 636 W/ 250 OVERRIDE (IP): Performed by: HOSPITALIST

## 2021-01-13 PROCEDURE — A9270 NON-COVERED ITEM OR SERVICE: HCPCS | Performed by: HOSPITALIST

## 2021-01-13 PROCEDURE — A9270 NON-COVERED ITEM OR SERVICE: HCPCS | Performed by: STUDENT IN AN ORGANIZED HEALTH CARE EDUCATION/TRAINING PROGRAM

## 2021-01-13 PROCEDURE — 700102 HCHG RX REV CODE 250 W/ 637 OVERRIDE(OP): Performed by: STUDENT IN AN ORGANIZED HEALTH CARE EDUCATION/TRAINING PROGRAM

## 2021-01-13 PROCEDURE — 700102 HCHG RX REV CODE 250 W/ 637 OVERRIDE(OP): Performed by: HOSPITALIST

## 2021-01-13 PROCEDURE — 700101 HCHG RX REV CODE 250: Performed by: STUDENT IN AN ORGANIZED HEALTH CARE EDUCATION/TRAINING PROGRAM

## 2021-01-13 RX ADMIN — POLYETHYLENE GLYCOL 3350 1 PACKET: 17 POWDER, FOR SOLUTION ORAL at 06:00

## 2021-01-13 RX ADMIN — ISOSORBIDE MONONITRATE 30 MG: 30 TABLET, EXTENDED RELEASE ORAL at 06:00

## 2021-01-13 RX ADMIN — OXYCODONE HYDROCHLORIDE 10 MG: 10 TABLET ORAL at 01:15

## 2021-01-13 RX ADMIN — DOCUSATE SODIUM 50 MG AND SENNOSIDES 8.6 MG 2 TABLET: 8.6; 5 TABLET, FILM COATED ORAL at 06:00

## 2021-01-13 RX ADMIN — LOSARTAN POTASSIUM 25 MG: 25 TABLET, FILM COATED ORAL at 06:00

## 2021-01-13 RX ADMIN — HEPARIN SODIUM 5000 UNITS: 5000 INJECTION, SOLUTION INTRAVENOUS; SUBCUTANEOUS at 06:00

## 2021-01-13 RX ADMIN — LIDOCAINE 1 PATCH: 50 PATCH TOPICAL at 08:15

## 2021-01-13 RX ADMIN — ALLOPURINOL 150 MG: 300 TABLET ORAL at 06:00

## 2021-01-13 RX ADMIN — OXYCODONE HYDROCHLORIDE 10 MG: 10 TABLET ORAL at 06:24

## 2021-01-13 ASSESSMENT — PAIN DESCRIPTION - PAIN TYPE
TYPE: ACUTE PAIN

## 2021-01-13 NOTE — PROGRESS NOTES
Pt discharged off unit with [facility] and is going to a SNF. He had all of his belongings with him. Report was given to SNF.

## 2021-01-13 NOTE — PROGRESS NOTES
Assume care of pt at 1900. Report received from Day Shift RN. Pt is A/O x3. Reorient the patient on situation. Pain is 0/10. Pt is resting in bed. Bed in lowest and locked position, call light within reach, hourly rounding in place. Labs reviewed. Communication board updated. Will continue to monitor.

## 2021-01-13 NOTE — DISCHARGE PLANNING
Anticipated Discharge Disposition: SNF    Action: LSW took call form Abi at [facility]. Pt has not left with [facility] yet and Abi doesn't have an RN who can assess Pt for Hospice this late in the day. By the time Pt arrives it will be too late as the assement takes a few hours. Abi can have an RN assess Pt at the SNF tomorrow morning but not this afternoon.     LSW updated BS RN that Pt will no D/C today. LSW asked RAKEL Keith to reschedule [facility] for tomorrow at 9am. LSW called Pt's ex-wife Barbara  to update her on change to tomorrow.     Barriers to Discharge: Hospice was not available. Postponed to tomorrow morning.     Plan: LSW to confirm d/c plan tomorrow morning and send Pt then.

## 2021-01-13 NOTE — PROGRESS NOTES
Assumed care of pt at 0700 from the night shift nurse. Pt is A&Ox3, disoriented to situation. He complains of 5/10 pain on his right thigh and asked specifically for a lidocaine patch. Pt reminded of discharge at 0900 to a skilled nursing facility. He denies any other needs at this time.

## 2021-01-13 NOTE — CARE PLAN
Problem: Safety  Goal: Will remain free from falls  Outcome: PROGRESSING AS EXPECTED  Note: Remind the patient to use call light for assistance.     Problem: Respiratory:  Goal: Respiratory status will improve  Outcome: PROGRESSING AS EXPECTED  Note: Monitor the patient O2 sat regularly.

## 2021-01-13 NOTE — DISCHARGE PLANNING
Anticipated Discharge Disposition: SNF    Action: LSW called [facility] to confirm this morning's transfer. LSW confirmed with Francisco and updated him that RN from [facility] should be coming around the same time as Pt for is assessment.     Barriers to Discharge: None    Plan: Pt to transfer via [facility] to [facility] with a RN from [facility]

## 2021-01-26 NOTE — DOCUMENTATION QUERY
[facility]                                                                       Query Response Note      PATIENT:               DEEJAY POON  ACCT #:                  [id number]  MRN:                     [id number]  :                      1935  ADMIT DATE:       2020 9:15 AM  DISCH DATE:        2021 9:39 AM  RESPONDING  PROVIDER #:        [id number]           QUERY TEXT:    Sepsis was mentioned in the medical record.    Please clarify the following:    Thank you,    [email or web address]    The patient's clinical indicators include:  ED report  The patient received ceftriaxone 1 g IV initially empirically for his sepsis with a lactic acidosis of 4.3.     3:47p  WBC down to 10k with rx of sepsis     PN  Lung  Assessment & Plan  WBC 42.0 in setting of CML?, dropped to 31.8 next day, now 10.5, blast crisis active versus sepsis versus leukocytosis    DC Diagnoses:  Acute respiratory failure, acute/chronic kidney injury, CMML, CHF, Ecoli Bacteremia  Options provided:   -- Sepsis ruled in, present on admit   -- Sepsis ruled in, developed after admission   -- Sepsis ruled out   -- Unable to determine      Query created by: Pierce Taylor on 2021 8:17 PM    RESPONSE TEXT:    Sepsis ruled out          Electronically signed by:  SCOTTIE ANDREWS 2021 7:16 AM

## 2024-07-26 NOTE — PROGRESS NOTES
07/26/24 0748   Team Meeting   Meeting Type Daily Rounds   Team Members Present   Team Members Present Physician;Nurse;;Other (Discipline and Name)   Patient/Family Present   Patient Present No   Patient's Family Present No     In attendance:  Dr. Alex Thomas, MD Dr. Jordan Holter, DO Guy Taylor, RN  Luisana Alvarado, [facility]  ERIC DanielS.    Groups: 3/9    In person visit with Aunt 10/10:30 Saturday. Pt generally cooperative, pleasant, no bx issues noted. ECT continuing.      Pt refusing brief change and skin assessment. Education provided r/t incontinence associated dermatitis and extended exposure to a wet brief. Pt verbalized understanding.